# Patient Record
Sex: MALE | Race: WHITE | NOT HISPANIC OR LATINO
[De-identification: names, ages, dates, MRNs, and addresses within clinical notes are randomized per-mention and may not be internally consistent; named-entity substitution may affect disease eponyms.]

---

## 2017-01-10 ENCOUNTER — OTHER (OUTPATIENT)
Age: 57
End: 2017-01-10

## 2017-06-19 ENCOUNTER — RESULT REVIEW (OUTPATIENT)
Age: 57
End: 2017-06-19

## 2017-06-20 ENCOUNTER — TRANSCRIPTION ENCOUNTER (OUTPATIENT)
Age: 57
End: 2017-06-20

## 2018-08-21 ENCOUNTER — RECORD ABSTRACTING (OUTPATIENT)
Age: 58
End: 2018-08-21

## 2018-08-21 DIAGNOSIS — Z78.9 OTHER SPECIFIED HEALTH STATUS: ICD-10-CM

## 2018-09-14 ENCOUNTER — APPOINTMENT (OUTPATIENT)
Dept: PULMONOLOGY | Facility: CLINIC | Age: 58
End: 2018-09-14
Payer: MEDICARE

## 2018-09-14 VITALS — SYSTOLIC BLOOD PRESSURE: 135 MMHG | DIASTOLIC BLOOD PRESSURE: 81 MMHG | OXYGEN SATURATION: 97 % | HEART RATE: 54 BPM

## 2018-09-14 DIAGNOSIS — G47.33 OBSTRUCTIVE SLEEP APNEA (ADULT) (PEDIATRIC): ICD-10-CM

## 2018-09-14 PROCEDURE — 99213 OFFICE O/P EST LOW 20 MIN: CPT

## 2019-03-18 ENCOUNTER — APPOINTMENT (OUTPATIENT)
Dept: PULMONOLOGY | Facility: CLINIC | Age: 59
End: 2019-03-18

## 2020-01-26 ENCOUNTER — EMERGENCY (EMERGENCY)
Facility: HOSPITAL | Age: 60
LOS: 1 days | Discharge: HOME | End: 2020-01-26
Attending: EMERGENCY MEDICINE | Admitting: EMERGENCY MEDICINE
Payer: MEDICARE

## 2020-01-26 VITALS
TEMPERATURE: 99 F | RESPIRATION RATE: 15 BRPM | HEART RATE: 74 BPM | SYSTOLIC BLOOD PRESSURE: 157 MMHG | DIASTOLIC BLOOD PRESSURE: 78 MMHG | OXYGEN SATURATION: 96 %

## 2020-01-26 VITALS
DIASTOLIC BLOOD PRESSURE: 81 MMHG | RESPIRATION RATE: 17 BRPM | OXYGEN SATURATION: 100 % | SYSTOLIC BLOOD PRESSURE: 146 MMHG | HEART RATE: 80 BPM | TEMPERATURE: 99 F

## 2020-01-26 LAB
ALBUMIN SERPL ELPH-MCNC: 3.8 G/DL — SIGNIFICANT CHANGE UP (ref 3.3–5)
ALP SERPL-CCNC: 87 U/L — SIGNIFICANT CHANGE UP (ref 40–120)
ALT FLD-CCNC: 11 U/L — SIGNIFICANT CHANGE UP (ref 4–41)
ANION GAP SERPL CALC-SCNC: 17 MMO/L — HIGH (ref 7–14)
AST SERPL-CCNC: 14 U/L — SIGNIFICANT CHANGE UP (ref 4–40)
BASOPHILS # BLD AUTO: 0.03 K/UL — SIGNIFICANT CHANGE UP (ref 0–0.2)
BASOPHILS NFR BLD AUTO: 0.2 % — SIGNIFICANT CHANGE UP (ref 0–2)
BILIRUB SERPL-MCNC: 0.8 MG/DL — SIGNIFICANT CHANGE UP (ref 0.2–1.2)
BUN SERPL-MCNC: 31 MG/DL — HIGH (ref 7–23)
CALCIUM SERPL-MCNC: 9.3 MG/DL — SIGNIFICANT CHANGE UP (ref 8.4–10.5)
CHLORIDE SERPL-SCNC: 98 MMOL/L — SIGNIFICANT CHANGE UP (ref 98–107)
CO2 SERPL-SCNC: 27 MMOL/L — SIGNIFICANT CHANGE UP (ref 22–31)
CREAT SERPL-MCNC: 1.74 MG/DL — HIGH (ref 0.5–1.3)
CRP SERPL-MCNC: 40.3 MG/L — HIGH
EOSINOPHIL # BLD AUTO: 0.02 K/UL — SIGNIFICANT CHANGE UP (ref 0–0.5)
EOSINOPHIL NFR BLD AUTO: 0.2 % — SIGNIFICANT CHANGE UP (ref 0–6)
ERYTHROCYTE [SEDIMENTATION RATE] IN BLOOD: 55 MM/HR — HIGH (ref 1–15)
GLUCOSE SERPL-MCNC: 184 MG/DL — HIGH (ref 70–99)
HCT VFR BLD CALC: 39.7 % — SIGNIFICANT CHANGE UP (ref 39–50)
HGB BLD-MCNC: 13 G/DL — SIGNIFICANT CHANGE UP (ref 13–17)
IMM GRANULOCYTES NFR BLD AUTO: 0.2 % — SIGNIFICANT CHANGE UP (ref 0–1.5)
LYMPHOCYTES # BLD AUTO: 0.6 K/UL — LOW (ref 1–3.3)
LYMPHOCYTES # BLD AUTO: 5 % — LOW (ref 13–44)
MCHC RBC-ENTMCNC: 29.5 PG — SIGNIFICANT CHANGE UP (ref 27–34)
MCHC RBC-ENTMCNC: 32.7 % — SIGNIFICANT CHANGE UP (ref 32–36)
MCV RBC AUTO: 90 FL — SIGNIFICANT CHANGE UP (ref 80–100)
MONOCYTES # BLD AUTO: 0.93 K/UL — HIGH (ref 0–0.9)
MONOCYTES NFR BLD AUTO: 7.7 % — SIGNIFICANT CHANGE UP (ref 2–14)
NEUTROPHILS # BLD AUTO: 10.46 K/UL — HIGH (ref 1.8–7.4)
NEUTROPHILS NFR BLD AUTO: 86.7 % — HIGH (ref 43–77)
NRBC # FLD: 0 K/UL — SIGNIFICANT CHANGE UP (ref 0–0)
PLATELET # BLD AUTO: 170 K/UL — SIGNIFICANT CHANGE UP (ref 150–400)
PMV BLD: 10.6 FL — SIGNIFICANT CHANGE UP (ref 7–13)
POTASSIUM SERPL-MCNC: 3.5 MMOL/L — SIGNIFICANT CHANGE UP (ref 3.5–5.3)
POTASSIUM SERPL-SCNC: 3.5 MMOL/L — SIGNIFICANT CHANGE UP (ref 3.5–5.3)
PROT SERPL-MCNC: 7.6 G/DL — SIGNIFICANT CHANGE UP (ref 6–8.3)
RBC # BLD: 4.41 M/UL — SIGNIFICANT CHANGE UP (ref 4.2–5.8)
RBC # FLD: 13.4 % — SIGNIFICANT CHANGE UP (ref 10.3–14.5)
SODIUM SERPL-SCNC: 142 MMOL/L — SIGNIFICANT CHANGE UP (ref 135–145)
WBC # BLD: 12.07 K/UL — HIGH (ref 3.8–10.5)
WBC # FLD AUTO: 12.07 K/UL — HIGH (ref 3.8–10.5)

## 2020-01-26 PROCEDURE — 73110 X-RAY EXAM OF WRIST: CPT | Mod: 26,RT

## 2020-01-26 PROCEDURE — 99284 EMERGENCY DEPT VISIT MOD MDM: CPT | Mod: GC

## 2020-01-26 RX ORDER — KETOROLAC TROMETHAMINE 30 MG/ML
30 SYRINGE (ML) INJECTION ONCE
Refills: 0 | Status: DISCONTINUED | OUTPATIENT
Start: 2020-01-26 | End: 2020-01-26

## 2020-01-26 RX ORDER — COLCHICINE 0.6 MG
0.6 TABLET ORAL ONCE
Refills: 0 | Status: COMPLETED | OUTPATIENT
Start: 2020-01-26 | End: 2020-01-26

## 2020-01-26 RX ORDER — ACETAMINOPHEN 500 MG
975 TABLET ORAL ONCE
Refills: 0 | Status: COMPLETED | OUTPATIENT
Start: 2020-01-26 | End: 2020-01-26

## 2020-01-26 RX ORDER — COLCHICINE 0.6 MG
1 TABLET ORAL
Qty: 14 | Refills: 0
Start: 2020-01-26 | End: 2020-02-01

## 2020-01-26 RX ADMIN — Medication 0.6 MILLIGRAM(S): at 06:54

## 2020-01-26 RX ADMIN — Medication 975 MILLIGRAM(S): at 03:50

## 2020-01-26 RX ADMIN — Medication 30 MILLIGRAM(S): at 03:50

## 2020-01-26 RX ADMIN — Medication 975 MILLIGRAM(S): at 04:40

## 2020-01-26 RX ADMIN — Medication 30 MILLIGRAM(S): at 04:41

## 2020-01-26 NOTE — ED ADULT NURSE NOTE - NSIMPLEMENTINTERV_GEN_ALL_ED
Implemented All Universal Safety Interventions:  [place] to call system. Call bell, personal items and telephone within reach. Instruct patient to call for assistance. Room bathroom lighting operational. Non-slip footwear when patient is off stretcher. Physically safe environment: no spills, clutter or unnecessary equipment. Stretcher in lowest position, wheels locked, appropriate side rails in place.

## 2020-01-26 NOTE — ED PROVIDER NOTE - PHYSICAL EXAMINATION
Gen: AAOx3, non-toxic  Head: NCAT  HEENT: EOMI, oral mucosa moist, normal conjunctiva  Lung: CTAB, no respiratory distress, no wheezes/rhonchi/rales B/L, speaking in full sentences  CV: RRR, no murmurs, rubs or gallops  Abd: soft, NTND, no guarding, no CVA tenderness  MSK: Right wrist: exquisite TTP on dorsum, warmth with no fluctuance, limited range of motion due to pain. Neurovascularly intact.   Neuro: No focal sensory or motor deficits, normal CN exam   Skin: see above  Psych: normal affect.

## 2020-01-26 NOTE — ED ADULT NURSE NOTE - OBJECTIVE STATEMENT
Pt received to room 7 a/o x 3 c/o right wrist pain x 3 days. pt denies any trauma or any inj. pt states he has hx of gout and worried it may be that. pt has equal motor and sensation to bilateral upper extremities. Pt states its too painful to move. pt right wrist noted to be warm, swollen and red. Respirations even and unlabored. Lung sounds clear with equal chest rise bilaterally. ABD is soft, non tender, non distended with normal active bowel sounds No complaints of chest pain, headache, nausea, dizziness, vomiting  SOB, fever, chills verbalized.

## 2020-01-26 NOTE — ED PROVIDER NOTE - NSFOLLOWUPINSTRUCTIONS_ED_ALL_ED_FT
Please take the colchicine as prescribed.    Take tylenol (600mg every 8 hours as needed for pain, do not take on an empty stomach).    Follow up with your primary doctor as soon as possible.    Purchase and use wrist splints as discussed.    Return to the emergency department if you develop fevers, chills, worsening pain despite taking these medication, or any other symptoms that are concerning to you.

## 2020-01-26 NOTE — ED PROVIDER NOTE - OBJECTIVE STATEMENT
60yo M with gout, DM, HTN and HLD presents with right wrist pain for the last 2 days;  constant pain on the dorsum of his right wrist worse on hand movement. Denies precipitating factors, fractures or falls. Denies fevers or chills. Reports warmth and redness. Reports last gout attack was multiple years ago involving his knees and feet. Took percocet and Advil with no improvement.

## 2020-01-26 NOTE — ED PROVIDER NOTE - PATIENT PORTAL LINK FT
You can access the FollowMyHealth Patient Portal offered by [facility] by registering at the following website: [email or web address]. By joining [facility]’s FollowMyHealth portal, you will also be able to view your health information using other applications (apps) compatible with our system.

## 2020-01-26 NOTE — ED PROVIDER NOTE - PMH
Cataract  bilateral  HTN - Hypertension    Hyperlipidemia    Inguinal Hernia  Right 12/07  Obese    Quadriceps Tendon Rupture  5/98  Retinal Detachment  bilateral 2000, 2001

## 2020-01-26 NOTE — ED PROVIDER NOTE - CLINICAL SUMMARY MEDICAL DECISION MAKING FREE TEXT BOX
58yo M with gout, DM, HTN and HLD presents with right wrist pain for the last 2 days; Right wrist: exquisite TTP on dorsum, warmth with no fluctuance, limited range of motion due to pain. Neurovascularly intact..... 60yo M with gout, DM, HTN and HLD presents with right wrist pain for the last 2 days; Right wrist: exquisite TTP on dorsum, warmth with no fluctuance, limited range of motion due to pain. Neurovascularly intact. ddx; fracture vs less likely gout vs unlikely septic joint. Will get basic labs, esr, crp, x-ray + analgesia, and re-donna

## 2020-01-26 NOTE — ED ADULT TRIAGE NOTE - CHIEF COMPLAINT QUOTE
Pt c/o R wrist pain for the past 2 days.  Denies any injury.  PMHx: HTN, hyperlipidemia, hernia, retinal detachment

## 2020-01-26 NOTE — ED PROVIDER NOTE - ATTENDING CONTRIBUTION TO CARE
MD Nguyen:  I performed a face to face bedside interview with patient regarding history of present illness, review of symptoms and past medical history. I completed an independent physical exam(documented below).  I have discussed patient's plan of care with resident.   I agree with note as stated above, having amended the EMR as needed to reflect my findings. I have discussed the assessment and plan of care.  This includes during the time I functioned as the attending physician for this patient.  PE:  Gen: Alert, NAD  Head: NC, AT,  EOMI, normal lids/conjunctiva  ENT:  normal hearing, patent oropharynx without erythema/exudate  Neck: +supple, no tenderness/meningismus/JVD, +Trachea midline  Chest: no chest wall tenderness, equal chest rise  Pulm: Bilateral BS, normal resp effort, no wheeze/stridor/retractions  CV: RRR, no M/R/G, +dist pulses  Abd: +BS, soft, NT/ND  Rectal: deferred  Mskel: +erythema/edema/ttp dorsal R wrist  Skin: no rash  Neuro: AAOx3  MDM:   58yo  M pmh gout, c/o redness/swelling/pain to R wrist. Denies f/c. Feels like gout flares but this is a new location. H&P most consistent w/ gout flare (much less likely septic arthritis). Labs, xr, meds, reassess.

## 2025-04-02 ENCOUNTER — INPATIENT (INPATIENT)
Facility: HOSPITAL | Age: 65
LOS: 2 days | Discharge: NURSING, REHAB OR LONG-TERM CARE | End: 2025-04-05
Attending: INTERNAL MEDICINE | Admitting: INTERNAL MEDICINE
Payer: MEDICARE

## 2025-04-02 VITALS
HEART RATE: 94 BPM | SYSTOLIC BLOOD PRESSURE: 162 MMHG | RESPIRATION RATE: 18 BRPM | DIASTOLIC BLOOD PRESSURE: 108 MMHG | TEMPERATURE: 98 F | OXYGEN SATURATION: 98 %

## 2025-04-02 DIAGNOSIS — E11.9 TYPE 2 DIABETES MELLITUS WITHOUT COMPLICATIONS: ICD-10-CM

## 2025-04-02 DIAGNOSIS — N17.9 ACUTE KIDNEY FAILURE, UNSPECIFIED: ICD-10-CM

## 2025-04-02 DIAGNOSIS — I48.20 CHRONIC ATRIAL FIBRILLATION, UNSPECIFIED: ICD-10-CM

## 2025-04-02 DIAGNOSIS — I10 ESSENTIAL (PRIMARY) HYPERTENSION: ICD-10-CM

## 2025-04-02 DIAGNOSIS — Z29.9 ENCOUNTER FOR PROPHYLACTIC MEASURES, UNSPECIFIED: ICD-10-CM

## 2025-04-02 DIAGNOSIS — M25.562 PAIN IN LEFT KNEE: ICD-10-CM
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
## 2025-04-02 LAB
ADD ON TEST-SPECIMEN IN LAB: SIGNIFICANT CHANGE UP
ADD ON TEST-SPECIMEN IN LAB: SIGNIFICANT CHANGE UP
ALBUMIN SERPL ELPH-MCNC: 4.2 G/DL — SIGNIFICANT CHANGE UP (ref 3.3–5)
ALP SERPL-CCNC: 70 U/L — SIGNIFICANT CHANGE UP (ref 40–120)
ALT FLD-CCNC: 11 U/L — SIGNIFICANT CHANGE UP (ref 4–41)
ANION GAP SERPL CALC-SCNC: 18 MMOL/L — HIGH (ref 7–14)
ANION GAP SERPL CALC-SCNC: 18 MMOL/L — HIGH (ref 7–14)
APPEARANCE UR: CLEAR — SIGNIFICANT CHANGE UP
AST SERPL-CCNC: 18 U/L — SIGNIFICANT CHANGE UP (ref 4–40)
BASOPHILS # BLD AUTO: 0.09 K/UL — SIGNIFICANT CHANGE UP (ref 0–0.2)
BASOPHILS NFR BLD AUTO: 0.9 % — SIGNIFICANT CHANGE UP (ref 0–2)
BILIRUB SERPL-MCNC: 0.9 MG/DL — SIGNIFICANT CHANGE UP (ref 0.2–1.2)
BILIRUB UR-MCNC: NEGATIVE — SIGNIFICANT CHANGE UP
BUN SERPL-MCNC: 76 MG/DL — HIGH (ref 7–23)
BUN SERPL-MCNC: 81 MG/DL — HIGH (ref 7–23)
CALCIUM SERPL-MCNC: 8.9 MG/DL — SIGNIFICANT CHANGE UP (ref 8.4–10.5)
CALCIUM SERPL-MCNC: 9 MG/DL — SIGNIFICANT CHANGE UP (ref 8.4–10.5)
CHLORIDE SERPL-SCNC: 110 MMOL/L — HIGH (ref 98–107)
CHLORIDE SERPL-SCNC: 111 MMOL/L — HIGH (ref 98–107)
CO2 SERPL-SCNC: 13 MMOL/L — LOW (ref 22–31)
CO2 SERPL-SCNC: 15 MMOL/L — LOW (ref 22–31)
COLOR SPEC: YELLOW — SIGNIFICANT CHANGE UP
CREAT ?TM UR-MCNC: 20 MG/DL — SIGNIFICANT CHANGE UP
CREAT ?TM UR-MCNC: 28 MG/DL — SIGNIFICANT CHANGE UP
CREAT SERPL-MCNC: 3.79 MG/DL — HIGH (ref 0.5–1.3)
CREAT SERPL-MCNC: 3.91 MG/DL — HIGH (ref 0.5–1.3)
CRP SERPL-MCNC: <3 MG/L — SIGNIFICANT CHANGE UP
DIFF PNL FLD: ABNORMAL
EGFR: 16 ML/MIN/1.73M2 — LOW
EGFR: 16 ML/MIN/1.73M2 — LOW
EGFR: 17 ML/MIN/1.73M2 — LOW
EGFR: 17 ML/MIN/1.73M2 — LOW
EOSINOPHIL # BLD AUTO: 0.09 K/UL — SIGNIFICANT CHANGE UP (ref 0–0.5)
EOSINOPHIL NFR BLD AUTO: 0.9 % — SIGNIFICANT CHANGE UP (ref 0–6)
GLUCOSE BLDC GLUCOMTR-MCNC: 105 MG/DL — HIGH (ref 70–99)
GLUCOSE BLDC GLUCOMTR-MCNC: 83 MG/DL — SIGNIFICANT CHANGE UP (ref 70–99)
GLUCOSE BLDC GLUCOMTR-MCNC: 90 MG/DL — SIGNIFICANT CHANGE UP (ref 70–99)
GLUCOSE BLDC GLUCOMTR-MCNC: 97 MG/DL — SIGNIFICANT CHANGE UP (ref 70–99)
GLUCOSE SERPL-MCNC: 105 MG/DL — HIGH (ref 70–99)
GLUCOSE SERPL-MCNC: 119 MG/DL — HIGH (ref 70–99)
GLUCOSE UR QL: NEGATIVE MG/DL — SIGNIFICANT CHANGE UP
HCT VFR BLD CALC: 36.5 % — LOW (ref 39–50)
HGB BLD-MCNC: 12.1 G/DL — LOW (ref 13–17)
IANC: 8.3 K/UL — HIGH (ref 1.8–7.4)
KETONES UR-MCNC: NEGATIVE MG/DL — SIGNIFICANT CHANGE UP
LEUKOCYTE ESTERASE UR-ACNC: NEGATIVE — SIGNIFICANT CHANGE UP
LYMPHOCYTES # BLD AUTO: 0.09 K/UL — LOW (ref 1–3.3)
LYMPHOCYTES # BLD AUTO: 0.9 % — LOW (ref 13–44)
MCHC RBC-ENTMCNC: 30 PG — SIGNIFICANT CHANGE UP (ref 27–34)
MCHC RBC-ENTMCNC: 33.2 G/DL — SIGNIFICANT CHANGE UP (ref 32–36)
MCV RBC AUTO: 90.6 FL — SIGNIFICANT CHANGE UP (ref 80–100)
MONOCYTES # BLD AUTO: 0.77 K/UL — SIGNIFICANT CHANGE UP (ref 0–0.9)
MONOCYTES NFR BLD AUTO: 7.9 % — SIGNIFICANT CHANGE UP (ref 2–14)
NEUTROPHILS # BLD AUTO: 8.74 K/UL — HIGH (ref 1.8–7.4)
NEUTROPHILS NFR BLD AUTO: 89.4 % — HIGH (ref 43–77)
NITRITE UR-MCNC: NEGATIVE — SIGNIFICANT CHANGE UP
PH UR: 6 — SIGNIFICANT CHANGE UP (ref 5–8)
PLATELET # BLD AUTO: 124 K/UL — LOW (ref 150–400)
POTASSIUM SERPL-MCNC: 3.5 MMOL/L — SIGNIFICANT CHANGE UP (ref 3.5–5.3)
POTASSIUM SERPL-MCNC: 3.7 MMOL/L — SIGNIFICANT CHANGE UP (ref 3.5–5.3)
POTASSIUM SERPL-SCNC: 3.5 MMOL/L — SIGNIFICANT CHANGE UP (ref 3.5–5.3)
POTASSIUM SERPL-SCNC: 3.7 MMOL/L — SIGNIFICANT CHANGE UP (ref 3.5–5.3)
PROT ?TM UR-MCNC: 41 MG/DL — SIGNIFICANT CHANGE UP
PROT SERPL-MCNC: 6.8 G/DL — SIGNIFICANT CHANGE UP (ref 6–8.3)
PROT UR-MCNC: 30 MG/DL
PROT/CREAT UR-RTO: 2 RATIO — HIGH (ref 0–0.2)
RBC # BLD: 4.03 M/UL — LOW (ref 4.2–5.8)
RBC # FLD: 13.5 % — SIGNIFICANT CHANGE UP (ref 10.3–14.5)
SODIUM SERPL-SCNC: 141 MMOL/L — SIGNIFICANT CHANGE UP (ref 135–145)
SODIUM SERPL-SCNC: 144 MMOL/L — SIGNIFICANT CHANGE UP (ref 135–145)
SODIUM UR-SCNC: 87 MMOL/L — SIGNIFICANT CHANGE UP
SP GR SPEC: 1.01 — SIGNIFICANT CHANGE UP (ref 1–1.03)
URATE SERPL-MCNC: 7.5 MG/DL — SIGNIFICANT CHANGE UP (ref 3.4–8.8)
URATE SERPL-MCNC: 7.8 MG/DL — SIGNIFICANT CHANGE UP (ref 3.4–8.8)
UROBILINOGEN FLD QL: 0.2 MG/DL — SIGNIFICANT CHANGE UP (ref 0.2–1)
WBC # BLD: 9.78 K/UL — SIGNIFICANT CHANGE UP (ref 3.8–10.5)
WBC # FLD AUTO: 9.78 K/UL — SIGNIFICANT CHANGE UP (ref 3.8–10.5)

## 2025-04-02 PROCEDURE — 73502 X-RAY EXAM HIP UNI 2-3 VIEWS: CPT | Mod: 26,LT

## 2025-04-02 PROCEDURE — 73552 X-RAY EXAM OF FEMUR 2/>: CPT | Mod: 26,LT

## 2025-04-02 PROCEDURE — 76770 US EXAM ABDO BACK WALL COMP: CPT | Mod: 26

## 2025-04-02 PROCEDURE — 99223 1ST HOSP IP/OBS HIGH 75: CPT | Mod: FS,GC

## 2025-04-02 PROCEDURE — 99223 1ST HOSP IP/OBS HIGH 75: CPT

## 2025-04-02 PROCEDURE — 72170 X-RAY EXAM OF PELVIS: CPT | Mod: 26,XE

## 2025-04-02 PROCEDURE — 73562 X-RAY EXAM OF KNEE 3: CPT | Mod: 26,LT

## 2025-04-02 PROCEDURE — 99285 EMERGENCY DEPT VISIT HI MDM: CPT

## 2025-04-02 RX ORDER — INSULIN LISPRO 100 U/ML
INJECTION, SOLUTION INTRAVENOUS; SUBCUTANEOUS
Refills: 0 | Status: DISCONTINUED | OUTPATIENT
Start: 2025-04-02 | End: 2025-04-05

## 2025-04-02 RX ORDER — DEXTROSE 50 % IN WATER 50 %
25 SYRINGE (ML) INTRAVENOUS ONCE
Refills: 0 | Status: DISCONTINUED | OUTPATIENT
Start: 2025-04-02 | End: 2025-04-05

## 2025-04-02 RX ORDER — ACETAMINOPHEN 500 MG/5ML
650 LIQUID (ML) ORAL EVERY 6 HOURS
Refills: 0 | Status: DISCONTINUED | OUTPATIENT
Start: 2025-04-02 | End: 2025-04-02

## 2025-04-02 RX ORDER — GLUCAGON 3 MG/1
1 POWDER NASAL ONCE
Refills: 0 | Status: DISCONTINUED | OUTPATIENT
Start: 2025-04-02 | End: 2025-04-05

## 2025-04-02 RX ORDER — ACETAMINOPHEN 500 MG/5ML
975 LIQUID (ML) ORAL ONCE
Refills: 0 | Status: COMPLETED | OUTPATIENT
Start: 2025-04-02 | End: 2025-04-02

## 2025-04-02 RX ORDER — DEXTROSE 50 % IN WATER 50 %
15 SYRINGE (ML) INTRAVENOUS ONCE
Refills: 0 | Status: DISCONTINUED | OUTPATIENT
Start: 2025-04-02 | End: 2025-04-05

## 2025-04-02 RX ORDER — ACETAMINOPHEN 500 MG/5ML
650 LIQUID (ML) ORAL EVERY 6 HOURS
Refills: 0 | Status: DISCONTINUED | OUTPATIENT
Start: 2025-04-02 | End: 2025-04-05

## 2025-04-02 RX ORDER — METOPROLOL SUCCINATE 50 MG/1
100 TABLET, EXTENDED RELEASE ORAL DAILY
Refills: 0 | Status: DISCONTINUED | OUTPATIENT
Start: 2025-04-02 | End: 2025-04-05

## 2025-04-02 RX ORDER — APIXABAN 2.5 MG/1
5 TABLET, FILM COATED ORAL EVERY 12 HOURS
Refills: 0 | Status: DISCONTINUED | OUTPATIENT
Start: 2025-04-02 | End: 2025-04-05

## 2025-04-02 RX ORDER — AMMONIUM LACTATE 12 %
1 LOTION (ML) TOPICAL
Refills: 0 | Status: DISCONTINUED | OUTPATIENT
Start: 2025-04-02 | End: 2025-04-05

## 2025-04-02 RX ORDER — SODIUM CHLORIDE 9 G/1000ML
1000 INJECTION, SOLUTION INTRAVENOUS
Refills: 0 | Status: DISCONTINUED | OUTPATIENT
Start: 2025-04-02 | End: 2025-04-05

## 2025-04-02 RX ORDER — AMLODIPINE BESYLATE 10 MG/1
10 TABLET ORAL DAILY
Refills: 0 | Status: DISCONTINUED | OUTPATIENT
Start: 2025-04-02 | End: 2025-04-05

## 2025-04-02 RX ORDER — MELATONIN 5 MG
3 TABLET ORAL AT BEDTIME
Refills: 0 | Status: DISCONTINUED | OUTPATIENT
Start: 2025-04-02 | End: 2025-04-05

## 2025-04-02 RX ORDER — LIDOCAINE HYDROCHLORIDE 20 MG/ML
1 JELLY TOPICAL DAILY
Refills: 0 | Status: DISCONTINUED | OUTPATIENT
Start: 2025-04-02 | End: 2025-04-05

## 2025-04-02 RX ORDER — DEXTROSE 50 % IN WATER 50 %
12.5 SYRINGE (ML) INTRAVENOUS ONCE
Refills: 0 | Status: DISCONTINUED | OUTPATIENT
Start: 2025-04-02 | End: 2025-04-05

## 2025-04-02 RX ORDER — INSULIN LISPRO 100 U/ML
INJECTION, SOLUTION INTRAVENOUS; SUBCUTANEOUS AT BEDTIME
Refills: 0 | Status: DISCONTINUED | OUTPATIENT
Start: 2025-04-02 | End: 2025-04-05

## 2025-04-02 RX ADMIN — Medication 650 MILLIGRAM(S): at 14:47

## 2025-04-02 RX ADMIN — Medication 650 MILLIGRAM(S): at 19:08

## 2025-04-02 RX ADMIN — APIXABAN 5 MILLIGRAM(S): 2.5 TABLET, FILM COATED ORAL at 19:10

## 2025-04-02 RX ADMIN — Medication 25 MILLIGRAM(S): at 23:07

## 2025-04-02 RX ADMIN — Medication 650 MILLIGRAM(S): at 13:47

## 2025-04-02 RX ADMIN — LIDOCAINE HYDROCHLORIDE 1 PATCH: 20 JELLY TOPICAL at 13:47

## 2025-04-02 RX ADMIN — LIDOCAINE HYDROCHLORIDE 1 PATCH: 20 JELLY TOPICAL at 18:39

## 2025-04-02 RX ADMIN — Medication 1 APPLICATION(S): at 19:07

## 2025-04-02 RX ADMIN — Medication 975 MILLIGRAM(S): at 04:26

## 2025-04-02 RX ADMIN — Medication 975 MILLIGRAM(S): at 02:47

## 2025-04-02 RX ADMIN — Medication 650 MILLIGRAM(S): at 23:06

## 2025-04-02 RX ADMIN — Medication 25 MILLIGRAM(S): at 13:47

## 2025-04-02 RX ADMIN — Medication 1000 MILLILITER(S): at 04:18

## 2025-04-03 LAB
A1C WITH ESTIMATED AVERAGE GLUCOSE RESULT: 4.6 % — SIGNIFICANT CHANGE UP (ref 4–5.6)
ANION GAP SERPL CALC-SCNC: 18 MMOL/L — HIGH (ref 7–14)
BUN SERPL-MCNC: 64 MG/DL — HIGH (ref 7–23)
CALCIUM SERPL-MCNC: 8.6 MG/DL — SIGNIFICANT CHANGE UP (ref 8.4–10.5)
CHLORIDE SERPL-SCNC: 109 MMOL/L — HIGH (ref 98–107)
CHOLEST SERPL-MCNC: 107 MG/DL — SIGNIFICANT CHANGE UP
CK SERPL-CCNC: 154 U/L — SIGNIFICANT CHANGE UP (ref 30–200)
CO2 SERPL-SCNC: 15 MMOL/L — LOW (ref 22–31)
CREAT SERPL-MCNC: 3.62 MG/DL — HIGH (ref 0.5–1.3)
EGFR: 18 ML/MIN/1.73M2 — LOW
EGFR: 18 ML/MIN/1.73M2 — LOW
ESTIMATED AVERAGE GLUCOSE: 85 — SIGNIFICANT CHANGE UP
GLUCOSE BLDC GLUCOMTR-MCNC: 107 MG/DL — HIGH (ref 70–99)
GLUCOSE BLDC GLUCOMTR-MCNC: 82 MG/DL — SIGNIFICANT CHANGE UP (ref 70–99)
GLUCOSE BLDC GLUCOMTR-MCNC: 84 MG/DL — SIGNIFICANT CHANGE UP (ref 70–99)
GLUCOSE BLDC GLUCOMTR-MCNC: 98 MG/DL — SIGNIFICANT CHANGE UP (ref 70–99)
GLUCOSE SERPL-MCNC: 84 MG/DL — SIGNIFICANT CHANGE UP (ref 70–99)
HCT VFR BLD CALC: 32.6 % — LOW (ref 39–50)
HDLC SERPL-MCNC: 28 MG/DL — LOW
HGB BLD-MCNC: 10.8 G/DL — LOW (ref 13–17)
LDLC SERPL-MCNC: 61 MG/DL — SIGNIFICANT CHANGE UP
LIPID PNL WITH DIRECT LDL SERPL: 61 MG/DL — SIGNIFICANT CHANGE UP
MAGNESIUM SERPL-MCNC: 1.9 MG/DL — SIGNIFICANT CHANGE UP (ref 1.6–2.6)
MCHC RBC-ENTMCNC: 29.5 PG — SIGNIFICANT CHANGE UP (ref 27–34)
MCHC RBC-ENTMCNC: 33.1 G/DL — SIGNIFICANT CHANGE UP (ref 32–36)
MCV RBC AUTO: 89.1 FL — SIGNIFICANT CHANGE UP (ref 80–100)
NONHDLC SERPL-MCNC: 79 MG/DL — SIGNIFICANT CHANGE UP
NRBC # BLD AUTO: 0 K/UL — SIGNIFICANT CHANGE UP (ref 0–0)
NRBC # FLD: 0 K/UL — SIGNIFICANT CHANGE UP (ref 0–0)
NRBC BLD AUTO-RTO: 0 /100 WBCS — SIGNIFICANT CHANGE UP (ref 0–0)
PHOSPHATE SERPL-MCNC: 3.9 MG/DL — SIGNIFICANT CHANGE UP (ref 2.5–4.5)
PLATELET # BLD AUTO: 126 K/UL — LOW (ref 150–400)
POTASSIUM SERPL-MCNC: 3.2 MMOL/L — LOW (ref 3.5–5.3)
POTASSIUM SERPL-SCNC: 3.2 MMOL/L — LOW (ref 3.5–5.3)
RBC # BLD: 3.66 M/UL — LOW (ref 4.2–5.8)
RBC # FLD: 13.4 % — SIGNIFICANT CHANGE UP (ref 10.3–14.5)
SODIUM SERPL-SCNC: 142 MMOL/L — SIGNIFICANT CHANGE UP (ref 135–145)
TRIGL SERPL-MCNC: 94 MG/DL — SIGNIFICANT CHANGE UP
WBC # BLD: 6.71 K/UL — SIGNIFICANT CHANGE UP (ref 3.8–10.5)
WBC # FLD AUTO: 6.71 K/UL — SIGNIFICANT CHANGE UP (ref 3.8–10.5)

## 2025-04-03 PROCEDURE — 99232 SBSQ HOSP IP/OBS MODERATE 35: CPT | Mod: FS,GC

## 2025-04-03 RX ORDER — SODIUM BICARBONATE 1 MEQ/ML
650 SYRINGE (ML) INTRAVENOUS THREE TIMES A DAY
Refills: 0 | Status: DISCONTINUED | OUTPATIENT
Start: 2025-04-03 | End: 2025-04-05

## 2025-04-03 RX ORDER — TRAMADOL HYDROCHLORIDE 50 MG/1
25 TABLET, FILM COATED ORAL ONCE
Refills: 0 | Status: DISCONTINUED | OUTPATIENT
Start: 2025-04-03 | End: 2025-04-03

## 2025-04-03 RX ADMIN — LIDOCAINE HYDROCHLORIDE 1 PATCH: 20 JELLY TOPICAL at 12:39

## 2025-04-03 RX ADMIN — Medication 650 MILLIGRAM(S): at 14:13

## 2025-04-03 RX ADMIN — Medication 25 MILLIGRAM(S): at 21:54

## 2025-04-03 RX ADMIN — Medication 650 MILLIGRAM(S): at 21:54

## 2025-04-03 RX ADMIN — Medication 25 MILLIGRAM(S): at 05:56

## 2025-04-03 RX ADMIN — TRAMADOL HYDROCHLORIDE 25 MILLIGRAM(S): 50 TABLET, FILM COATED ORAL at 01:37

## 2025-04-03 RX ADMIN — Medication 1 APPLICATION(S): at 05:56

## 2025-04-03 RX ADMIN — LIDOCAINE HYDROCHLORIDE 1 PATCH: 20 JELLY TOPICAL at 18:22

## 2025-04-03 RX ADMIN — Medication 650 MILLIGRAM(S): at 05:55

## 2025-04-03 RX ADMIN — APIXABAN 5 MILLIGRAM(S): 2.5 TABLET, FILM COATED ORAL at 18:10

## 2025-04-03 RX ADMIN — Medication 650 MILLIGRAM(S): at 18:10

## 2025-04-03 RX ADMIN — AMLODIPINE BESYLATE 10 MILLIGRAM(S): 10 TABLET ORAL at 05:56

## 2025-04-03 RX ADMIN — Medication 1 APPLICATION(S): at 18:09

## 2025-04-03 RX ADMIN — METOPROLOL SUCCINATE 100 MILLIGRAM(S): 50 TABLET, EXTENDED RELEASE ORAL at 05:56

## 2025-04-03 RX ADMIN — Medication 650 MILLIGRAM(S): at 12:39

## 2025-04-03 RX ADMIN — Medication 1 APPLICATION(S): at 12:42

## 2025-04-03 RX ADMIN — LIDOCAINE HYDROCHLORIDE 1 PATCH: 20 JELLY TOPICAL at 08:26

## 2025-04-03 RX ADMIN — Medication 25 MILLIGRAM(S): at 14:13

## 2025-04-03 RX ADMIN — APIXABAN 5 MILLIGRAM(S): 2.5 TABLET, FILM COATED ORAL at 05:56

## 2025-04-03 RX ADMIN — TRAMADOL HYDROCHLORIDE 25 MILLIGRAM(S): 50 TABLET, FILM COATED ORAL at 00:37

## 2025-04-03 RX ADMIN — Medication 650 MILLIGRAM(S): at 08:27

## 2025-04-03 RX ADMIN — Medication 40 MILLIEQUIVALENT(S): at 09:49

## 2025-04-04 LAB
ANION GAP SERPL CALC-SCNC: 15 MMOL/L — HIGH (ref 7–14)
BUN SERPL-MCNC: 67 MG/DL — HIGH (ref 7–23)
CALCIUM SERPL-MCNC: 8.4 MG/DL — SIGNIFICANT CHANGE UP (ref 8.4–10.5)
CHLORIDE SERPL-SCNC: 110 MMOL/L — HIGH (ref 98–107)
CO2 SERPL-SCNC: 17 MMOL/L — LOW (ref 22–31)
CREAT SERPL-MCNC: 3.79 MG/DL — HIGH (ref 0.5–1.3)
EGFR: 17 ML/MIN/1.73M2 — LOW
EGFR: 17 ML/MIN/1.73M2 — LOW
GLUCOSE BLDC GLUCOMTR-MCNC: 120 MG/DL — HIGH (ref 70–99)
GLUCOSE BLDC GLUCOMTR-MCNC: 82 MG/DL — SIGNIFICANT CHANGE UP (ref 70–99)
GLUCOSE BLDC GLUCOMTR-MCNC: 88 MG/DL — SIGNIFICANT CHANGE UP (ref 70–99)
GLUCOSE BLDC GLUCOMTR-MCNC: 89 MG/DL — SIGNIFICANT CHANGE UP (ref 70–99)
GLUCOSE SERPL-MCNC: 96 MG/DL — SIGNIFICANT CHANGE UP (ref 70–99)
HCT VFR BLD CALC: 31.3 % — LOW (ref 39–50)
HGB BLD-MCNC: 10.4 G/DL — LOW (ref 13–17)
MAGNESIUM SERPL-MCNC: 2 MG/DL — SIGNIFICANT CHANGE UP (ref 1.6–2.6)
MCHC RBC-ENTMCNC: 29.5 PG — SIGNIFICANT CHANGE UP (ref 27–34)
MCHC RBC-ENTMCNC: 33.2 G/DL — SIGNIFICANT CHANGE UP (ref 32–36)
MCV RBC AUTO: 88.7 FL — SIGNIFICANT CHANGE UP (ref 80–100)
MRSA PCR RESULT.: SIGNIFICANT CHANGE UP
NRBC # BLD AUTO: 0 K/UL — SIGNIFICANT CHANGE UP (ref 0–0)
NRBC # FLD: 0 K/UL — SIGNIFICANT CHANGE UP (ref 0–0)
NRBC BLD AUTO-RTO: 0 /100 WBCS — SIGNIFICANT CHANGE UP (ref 0–0)
PHOSPHATE SERPL-MCNC: 3.8 MG/DL — SIGNIFICANT CHANGE UP (ref 2.5–4.5)
PLATELET # BLD AUTO: 121 K/UL — LOW (ref 150–400)
POTASSIUM SERPL-MCNC: 3.3 MMOL/L — LOW (ref 3.5–5.3)
POTASSIUM SERPL-SCNC: 3.3 MMOL/L — LOW (ref 3.5–5.3)
RBC # BLD: 3.53 M/UL — LOW (ref 4.2–5.8)
RBC # FLD: 13.6 % — SIGNIFICANT CHANGE UP (ref 10.3–14.5)
S AUREUS DNA NOSE QL NAA+PROBE: SIGNIFICANT CHANGE UP
SODIUM SERPL-SCNC: 142 MMOL/L — SIGNIFICANT CHANGE UP (ref 135–145)
WBC # BLD: 7.17 K/UL — SIGNIFICANT CHANGE UP (ref 3.8–10.5)
WBC # FLD AUTO: 7.17 K/UL — SIGNIFICANT CHANGE UP (ref 3.8–10.5)

## 2025-04-04 PROCEDURE — 99232 SBSQ HOSP IP/OBS MODERATE 35: CPT | Mod: FS,GC

## 2025-04-04 RX ORDER — ACETAMINOPHEN 500 MG/5ML
1000 LIQUID (ML) ORAL ONCE
Refills: 0 | Status: COMPLETED | OUTPATIENT
Start: 2025-04-04 | End: 2025-04-04

## 2025-04-04 RX ADMIN — LIDOCAINE HYDROCHLORIDE 1 PATCH: 20 JELLY TOPICAL at 02:16

## 2025-04-04 RX ADMIN — Medication 25 MILLIGRAM(S): at 21:52

## 2025-04-04 RX ADMIN — Medication 400 MILLIGRAM(S): at 21:52

## 2025-04-04 RX ADMIN — APIXABAN 5 MILLIGRAM(S): 2.5 TABLET, FILM COATED ORAL at 17:21

## 2025-04-04 RX ADMIN — METOPROLOL SUCCINATE 100 MILLIGRAM(S): 50 TABLET, EXTENDED RELEASE ORAL at 05:32

## 2025-04-04 RX ADMIN — LIDOCAINE HYDROCHLORIDE 1 PATCH: 20 JELLY TOPICAL at 11:07

## 2025-04-04 RX ADMIN — Medication 1 APPLICATION(S): at 11:12

## 2025-04-04 RX ADMIN — Medication 25 MILLIGRAM(S): at 14:15

## 2025-04-04 RX ADMIN — Medication 25 MILLIGRAM(S): at 05:32

## 2025-04-04 RX ADMIN — AMLODIPINE BESYLATE 10 MILLIGRAM(S): 10 TABLET ORAL at 05:32

## 2025-04-04 RX ADMIN — Medication 1000 MILLIGRAM(S): at 22:22

## 2025-04-04 RX ADMIN — Medication 650 MILLIGRAM(S): at 05:32

## 2025-04-04 RX ADMIN — Medication 650 MILLIGRAM(S): at 14:16

## 2025-04-04 RX ADMIN — Medication 1 APPLICATION(S): at 05:32

## 2025-04-04 RX ADMIN — LIDOCAINE HYDROCHLORIDE 1 PATCH: 20 JELLY TOPICAL at 23:04

## 2025-04-04 RX ADMIN — Medication 650 MILLIGRAM(S): at 01:25

## 2025-04-04 RX ADMIN — Medication 650 MILLIGRAM(S): at 17:21

## 2025-04-04 RX ADMIN — Medication 1 APPLICATION(S): at 17:21

## 2025-04-04 RX ADMIN — Medication 40 MILLIEQUIVALENT(S): at 14:15

## 2025-04-04 RX ADMIN — APIXABAN 5 MILLIGRAM(S): 2.5 TABLET, FILM COATED ORAL at 05:32

## 2025-04-04 RX ADMIN — Medication 650 MILLIGRAM(S): at 11:06

## 2025-04-04 RX ADMIN — Medication 40 MILLIEQUIVALENT(S): at 11:06

## 2025-04-04 RX ADMIN — Medication 650 MILLIGRAM(S): at 21:52

## 2025-04-05 VITALS
HEART RATE: 88 BPM | OXYGEN SATURATION: 99 % | SYSTOLIC BLOOD PRESSURE: 142 MMHG | RESPIRATION RATE: 18 BRPM | DIASTOLIC BLOOD PRESSURE: 90 MMHG

## 2025-04-05 PROBLEM — C61 MALIGNANT NEOPLASM OF PROSTATE: Chronic | Status: ACTIVE | Noted: 2025-04-02

## 2025-04-05 PROBLEM — E11.9 TYPE 2 DIABETES MELLITUS WITHOUT COMPLICATIONS: Chronic | Status: ACTIVE | Noted: 2025-04-02

## 2025-04-05 PROBLEM — N18.9 CHRONIC KIDNEY DISEASE, UNSPECIFIED: Chronic | Status: ACTIVE | Noted: 2025-04-02

## 2025-04-05 PROBLEM — M10.9 GOUT, UNSPECIFIED: Chronic | Status: ACTIVE | Noted: 2025-04-02

## 2025-04-05 PROBLEM — I48.20 CHRONIC ATRIAL FIBRILLATION, UNSPECIFIED: Chronic | Status: ACTIVE | Noted: 2025-04-02

## 2025-04-05 LAB
ANION GAP SERPL CALC-SCNC: 15 MMOL/L — HIGH (ref 7–14)
BUN SERPL-MCNC: 67 MG/DL — HIGH (ref 7–23)
CALCIUM SERPL-MCNC: 8.5 MG/DL — SIGNIFICANT CHANGE UP (ref 8.4–10.5)
CHLORIDE SERPL-SCNC: 110 MMOL/L — HIGH (ref 98–107)
CO2 SERPL-SCNC: 18 MMOL/L — LOW (ref 22–31)
CREAT SERPL-MCNC: 3.82 MG/DL — HIGH (ref 0.5–1.3)
EGFR: 17 ML/MIN/1.73M2 — LOW
EGFR: 17 ML/MIN/1.73M2 — LOW
GLUCOSE BLDC GLUCOMTR-MCNC: 92 MG/DL — SIGNIFICANT CHANGE UP (ref 70–99)
GLUCOSE BLDC GLUCOMTR-MCNC: 99 MG/DL — SIGNIFICANT CHANGE UP (ref 70–99)
GLUCOSE SERPL-MCNC: 97 MG/DL — SIGNIFICANT CHANGE UP (ref 70–99)
HCT VFR BLD CALC: 33 % — LOW (ref 39–50)
HGB BLD-MCNC: 10.7 G/DL — LOW (ref 13–17)
MAGNESIUM SERPL-MCNC: 2 MG/DL — SIGNIFICANT CHANGE UP (ref 1.6–2.6)
MCHC RBC-ENTMCNC: 29.6 PG — SIGNIFICANT CHANGE UP (ref 27–34)
MCHC RBC-ENTMCNC: 32.4 G/DL — SIGNIFICANT CHANGE UP (ref 32–36)
MCV RBC AUTO: 91.4 FL — SIGNIFICANT CHANGE UP (ref 80–100)
NRBC # BLD AUTO: 0 K/UL — SIGNIFICANT CHANGE UP (ref 0–0)
NRBC # FLD: 0 K/UL — SIGNIFICANT CHANGE UP (ref 0–0)
NRBC BLD AUTO-RTO: 0 /100 WBCS — SIGNIFICANT CHANGE UP (ref 0–0)
PHOSPHATE SERPL-MCNC: 3.5 MG/DL — SIGNIFICANT CHANGE UP (ref 2.5–4.5)
PLATELET # BLD AUTO: 119 K/UL — LOW (ref 150–400)
POTASSIUM SERPL-MCNC: 4 MMOL/L — SIGNIFICANT CHANGE UP (ref 3.5–5.3)
POTASSIUM SERPL-SCNC: 4 MMOL/L — SIGNIFICANT CHANGE UP (ref 3.5–5.3)
RBC # BLD: 3.61 M/UL — LOW (ref 4.2–5.8)
RBC # FLD: 13.4 % — SIGNIFICANT CHANGE UP (ref 10.3–14.5)
SODIUM SERPL-SCNC: 143 MMOL/L — SIGNIFICANT CHANGE UP (ref 135–145)
WBC # BLD: 7.65 K/UL — SIGNIFICANT CHANGE UP (ref 3.8–10.5)
WBC # FLD AUTO: 7.65 K/UL — SIGNIFICANT CHANGE UP (ref 3.8–10.5)

## 2025-04-05 RX ORDER — SODIUM BICARBONATE 1 MEQ/ML
1 SYRINGE (ML) INTRAVENOUS
Qty: 0 | Refills: 0 | DISCHARGE
Start: 2025-04-05

## 2025-04-05 RX ORDER — LIDOCAINE HYDROCHLORIDE 20 MG/ML
1 JELLY TOPICAL
Qty: 0 | Refills: 0 | DISCHARGE
Start: 2025-04-05

## 2025-04-05 RX ORDER — AMMONIUM LACTATE 12 %
1 LOTION (ML) TOPICAL
Qty: 0 | Refills: 0 | DISCHARGE
Start: 2025-04-05

## 2025-04-05 RX ORDER — INSULIN LISPRO 100 U/ML
1 INJECTION, SOLUTION INTRAVENOUS; SUBCUTANEOUS
Qty: 0 | Refills: 0 | DISCHARGE
Start: 2025-04-05

## 2025-04-05 RX ORDER — SITAGLIPTIN 100 MG/1
1 TABLET, FILM COATED ORAL
Refills: 0 | DISCHARGE

## 2025-04-05 RX ADMIN — Medication 650 MILLIGRAM(S): at 12:47

## 2025-04-05 RX ADMIN — APIXABAN 5 MILLIGRAM(S): 2.5 TABLET, FILM COATED ORAL at 06:32

## 2025-04-05 RX ADMIN — Medication 650 MILLIGRAM(S): at 06:31

## 2025-04-05 RX ADMIN — Medication 650 MILLIGRAM(S): at 06:32

## 2025-04-05 RX ADMIN — Medication 1 APPLICATION(S): at 06:33

## 2025-04-05 RX ADMIN — AMLODIPINE BESYLATE 10 MILLIGRAM(S): 10 TABLET ORAL at 06:33

## 2025-04-05 RX ADMIN — Medication 25 MILLIGRAM(S): at 06:33

## 2025-04-05 RX ADMIN — LIDOCAINE HYDROCHLORIDE 1 PATCH: 20 JELLY TOPICAL at 00:59

## 2025-04-22 ENCOUNTER — RESULT REVIEW (OUTPATIENT)
Age: 65
End: 2025-04-22

## 2025-04-25 ENCOUNTER — RESULT REVIEW (OUTPATIENT)
Age: 65
End: 2025-04-25

## 2025-04-25 ENCOUNTER — OUTPATIENT (OUTPATIENT)
Dept: OUTPATIENT SERVICES | Facility: HOSPITAL | Age: 65
LOS: 1 days | End: 2025-04-25
Payer: MEDICARE

## 2025-04-25 ENCOUNTER — APPOINTMENT (OUTPATIENT)
Dept: ULTRASOUND IMAGING | Facility: HOSPITAL | Age: 65
End: 2025-04-25

## 2025-04-25 DIAGNOSIS — N18.1 CHRONIC KIDNEY DISEASE, STAGE 1: ICD-10-CM

## 2025-04-25 PROCEDURE — 76770 US EXAM ABDO BACK WALL COMP: CPT

## 2025-04-25 PROCEDURE — 76770 US EXAM ABDO BACK WALL COMP: CPT | Mod: 26

## 2025-05-01 ENCOUNTER — INPATIENT (INPATIENT)
Facility: HOSPITAL | Age: 65
LOS: 12 days | Discharge: NURSING, REHAB OR LONG-TERM CARE | DRG: 869 | End: 2025-05-14
Attending: INTERNAL MEDICINE | Admitting: STUDENT IN AN ORGANIZED HEALTH CARE EDUCATION/TRAINING PROGRAM
Payer: MEDICARE

## 2025-05-01 VITALS
WEIGHT: 270.07 LBS | HEIGHT: 74 IN | OXYGEN SATURATION: 97 % | HEART RATE: 100 BPM | TEMPERATURE: 98 F | RESPIRATION RATE: 20 BRPM | DIASTOLIC BLOOD PRESSURE: 81 MMHG | SYSTOLIC BLOOD PRESSURE: 128 MMHG

## 2025-05-01 DIAGNOSIS — R89.9 UNSPECIFIED ABNORMAL FINDING IN SPECIMENS FROM OTHER ORGANS, SYSTEMS AND TISSUES: ICD-10-CM

## 2025-05-01 LAB
ALBUMIN SERPL ELPH-MCNC: 3.1 G/DL — LOW (ref 3.3–5)
ALP SERPL-CCNC: 103 U/L — SIGNIFICANT CHANGE UP (ref 40–120)
ALT FLD-CCNC: 14 U/L — SIGNIFICANT CHANGE UP (ref 10–45)
ANION GAP SERPL CALC-SCNC: 20 MMOL/L — HIGH (ref 5–17)
ANISOCYTOSIS BLD QL: SLIGHT — SIGNIFICANT CHANGE UP
AST SERPL-CCNC: 13 U/L — SIGNIFICANT CHANGE UP (ref 10–40)
BASOPHILS # BLD AUTO: 0 K/UL — SIGNIFICANT CHANGE UP (ref 0–0.2)
BASOPHILS NFR BLD AUTO: 0 % — SIGNIFICANT CHANGE UP (ref 0–2)
BILIRUB SERPL-MCNC: 0.6 MG/DL — SIGNIFICANT CHANGE UP (ref 0.2–1.2)
BUN SERPL-MCNC: 137 MG/DL — HIGH (ref 7–23)
CALCIUM SERPL-MCNC: 9.1 MG/DL — SIGNIFICANT CHANGE UP (ref 8.4–10.5)
CHLORIDE SERPL-SCNC: 104 MMOL/L — SIGNIFICANT CHANGE UP (ref 96–108)
CO2 SERPL-SCNC: 16 MMOL/L — LOW (ref 22–31)
CREAT SERPL-MCNC: 5.85 MG/DL — HIGH (ref 0.5–1.3)
EGFR: 10 ML/MIN/1.73M2 — LOW
EGFR: 10 ML/MIN/1.73M2 — LOW
EOSINOPHIL # BLD AUTO: 0 K/UL — SIGNIFICANT CHANGE UP (ref 0–0.5)
EOSINOPHIL NFR BLD AUTO: 0 % — SIGNIFICANT CHANGE UP (ref 0–6)
GAS PNL BLDV: SIGNIFICANT CHANGE UP
GLUCOSE SERPL-MCNC: 104 MG/DL — HIGH (ref 70–99)
HCT VFR BLD CALC: 28.5 % — LOW (ref 39–50)
HGB BLD-MCNC: 8.5 G/DL — LOW (ref 13–17)
LYMPHOCYTES # BLD AUTO: 0.47 K/UL — LOW (ref 1–3.3)
LYMPHOCYTES # BLD AUTO: 5 % — LOW (ref 13–44)
MAGNESIUM SERPL-MCNC: 2.3 MG/DL — SIGNIFICANT CHANGE UP (ref 1.6–2.6)
MANUAL SMEAR VERIFICATION: SIGNIFICANT CHANGE UP
MCHC RBC-ENTMCNC: 29.3 PG — SIGNIFICANT CHANGE UP (ref 27–34)
MCHC RBC-ENTMCNC: 29.8 G/DL — LOW (ref 32–36)
MCV RBC AUTO: 98.3 FL — SIGNIFICANT CHANGE UP (ref 80–100)
MICROCYTES BLD QL: SLIGHT — SIGNIFICANT CHANGE UP
MONOCYTES # BLD AUTO: 0.85 K/UL — SIGNIFICANT CHANGE UP (ref 0–0.9)
MONOCYTES NFR BLD AUTO: 9 % — SIGNIFICANT CHANGE UP (ref 2–14)
NEUTROPHILS # BLD AUTO: 8.16 K/UL — HIGH (ref 1.8–7.4)
NEUTROPHILS NFR BLD AUTO: 86 % — HIGH (ref 43–77)
NRBC # BLD: 0 /100 WBCS — SIGNIFICANT CHANGE UP (ref 0–0)
NRBC BLD-RTO: 0 /100 WBCS — SIGNIFICANT CHANGE UP (ref 0–0)
OVALOCYTES BLD QL SMEAR: SLIGHT — SIGNIFICANT CHANGE UP
PHOSPHATE SERPL-MCNC: 6.3 MG/DL — HIGH (ref 2.5–4.5)
PLAT MORPH BLD: NORMAL — SIGNIFICANT CHANGE UP
PLATELET # BLD AUTO: 231 K/UL — SIGNIFICANT CHANGE UP (ref 150–400)
POIKILOCYTOSIS BLD QL AUTO: SLIGHT — SIGNIFICANT CHANGE UP
POTASSIUM SERPL-MCNC: 4.9 MMOL/L — SIGNIFICANT CHANGE UP (ref 3.5–5.3)
POTASSIUM SERPL-SCNC: 4.9 MMOL/L — SIGNIFICANT CHANGE UP (ref 3.5–5.3)
PROT SERPL-MCNC: 7 G/DL — SIGNIFICANT CHANGE UP (ref 6–8.3)
RBC # BLD: 2.9 M/UL — LOW (ref 4.2–5.8)
RBC # FLD: 13.7 % — SIGNIFICANT CHANGE UP (ref 10.3–14.5)
RBC BLD AUTO: ABNORMAL
SCHISTOCYTES BLD QL AUTO: SLIGHT — SIGNIFICANT CHANGE UP
SODIUM SERPL-SCNC: 140 MMOL/L — SIGNIFICANT CHANGE UP (ref 135–145)
WBC # BLD: 9.49 K/UL — SIGNIFICANT CHANGE UP (ref 3.8–10.5)
WBC # FLD AUTO: 9.49 K/UL — SIGNIFICANT CHANGE UP (ref 3.8–10.5)

## 2025-05-01 PROCEDURE — 99285 EMERGENCY DEPT VISIT HI MDM: CPT | Mod: GC

## 2025-05-01 PROCEDURE — 93010 ELECTROCARDIOGRAM REPORT: CPT

## 2025-05-01 RX ORDER — LIDOCAINE HYDROCHLORIDE 20 MG/ML
1 JELLY TOPICAL ONCE
Refills: 0 | Status: COMPLETED | OUTPATIENT
Start: 2025-05-01 | End: 2025-05-01

## 2025-05-01 RX ORDER — ACETAMINOPHEN 500 MG/5ML
1000 LIQUID (ML) ORAL ONCE
Refills: 0 | Status: COMPLETED | OUTPATIENT
Start: 2025-05-01 | End: 2025-05-01

## 2025-05-01 RX ORDER — SODIUM BICARBONATE 1 MEQ/ML
650 SYRINGE (ML) INTRAVENOUS THREE TIMES A DAY
Refills: 0 | Status: DISCONTINUED | OUTPATIENT
Start: 2025-05-01 | End: 2025-05-04

## 2025-05-01 RX ADMIN — Medication 400 MILLIGRAM(S): at 17:26

## 2025-05-01 RX ADMIN — Medication 666.67 MILLILITER(S): at 17:26

## 2025-05-01 RX ADMIN — LIDOCAINE HYDROCHLORIDE 1 PATCH: 20 JELLY TOPICAL at 17:26

## 2025-05-01 NOTE — ED ADULT NURSE REASSESSMENT NOTE - NS ED NURSE REASSESS COMMENT FT1
Patient reports pain that is "everywhere", worse in the L knee. Medications given per order. Patient updated on plan of care.

## 2025-05-01 NOTE — ED PROVIDER NOTE - OBJECTIVE STATEMENT
Saint Jair  (PGY2): 64-year-old male, with a history of type 2 diabetes on Januvia, hypertension, hyperlipidemia, gout, A-fib on Eliquis, CKD not on dialysis (last documented Cr 2.82 on 4/5/25), severe OA, recent admission for fall discharged to [facility] rehab, brought in by EMS from [facility] for abnormal lab results. Found to have elevated BUN/Cr 130/5.98. Patient's kidney function has been slowly increasing over the last few weeks. Spoke with Dr. Egan, patient was seen by Dr. May at [facility] who recommend renal US. Patient found to have cyst and mass in right kidney. Patient, himself, currently reporting pain in his buttocks, unchanged from prior. Otherwise, no other symptoms, including chest pain, SOB, lightheadedness, dizziness, neurological symptoms, abdominal pain, back pain, nausea, vomiting, diarrhea or constipation.

## 2025-05-01 NOTE — ED ADULT NURSE NOTE - OBJECTIVE STATEMENT
63 yo M presents to ED A+OX3 via EMS from [facility] for abnormal labs. Per EMS, patient had routine lab work done this morning, came back showing elevated BUN and creatinine and patient was sent to ED for further evaluation. Patient denies burning with urination, urinary frequency, fever, chills, back pain, abdominal pain. Breathing is spontaneous and unlabored on room air. Skin warm pink and dry. Bed in lowest position, side rails up.

## 2025-05-01 NOTE — ED PROVIDER NOTE - MDM ORDERS SUBMITTED SELECTION
Pt on Bipap most of NOC. Desats quickly when taken off. BS course/crackles throughout. Will cont to follow.    10/6/2017  Arlet Vergara RT     Medications

## 2025-05-01 NOTE — ED ADULT NURSE REASSESSMENT NOTE - NS ED NURSE REASSESS COMMENT FT1
Patient found in stretcher breathing spontaneously and unlabored on Room Air. No signs of respiratory distress @ this time. The patient is alert and orientated x4 and responds appropriately. The patient presents with a Right Forearm 20G PIV that is C/D/I and saline locked @ this time. Note patient found incontinent and saturated with urine. RN bedside to perform change but patient also endorsed having to void presently and emptied about 300cc of brown cloudy urine via urinal. Perineal care provided and new diaper and linens in place. Patient Left Thigh noted to present severely ecchymotic and patient notes pain with movement. LLE placed elevated upon a pillow at this time. Patients noted to present with abrasions to his backside as well as a rectum site that presents enlarged.  Pt denies chest pain, palpitations, shortness of breath, headache, visual disturbances, numbness/tingling, fever, chills, diaphoresis,  nausea, vomiting, constipation, diarrhea, or urinary symptoms. Safety and comfort measures provided, bed locked and in lowest position, side rails up for safety. VS to order and Transport bedside to take patient to [facility].

## 2025-05-01 NOTE — ED PROVIDER NOTE - CLINICAL SUMMARY MEDICAL DECISION MAKING FREE TEXT BOX
Saint JairDO (PGY2): well-appearing 64-year-old male, with a history of type 2 diabetes on Januvia, hypertension, hyperlipidemia, gout, A-fib on Eliquis, CKD not on dialysis (last documented Cr 2.82 on 4/5/25), severe OA, recent admission for fall discharged to [facility] rehab, brought in by EMS from [facility] for abnormal lab results. Vitals stable. No symptoms reported at this time. I reviewed the patient's labs from [facility] (obtained today at 0900) - Creatinine 5.98. Patient with hemoglobin of 7.1, baseline seems to be around 10. Reports no bleeding, including melena and hematochezia. Will get in touch with patient's nephrologist for recs.

## 2025-05-01 NOTE — ED PROVIDER NOTE - PHYSICAL EXAMINATION
GENERAL: no acute distress, non-toxic appearing  HEAD: normocephalic, atraumatic  HEENT: oral mucosa moist, full ROM of neck  CARDIAC: regular rate rhythm, normal S1/S2  CHEST: CTA BL, no wheeze or crackles  ABDOMEN: normal BS, soft, no tenderness  EXTREMITY: no gross deformity, no edema, good perfusion. Skin slightly discolored in bilateral LE  NEURO: alert and orientedx3, no focal deficits

## 2025-05-01 NOTE — ED PROVIDER NOTE - PROGRESS NOTE DETAILS
Saint Jair, DO (PGY2): spoke with Dr. May. States that patient's worsening kidney function unable to be managed at rehab. Would like patient admitted for possible dialysis in the next couple of days. Saint Jair, DO (PGY2): spoke with Dr. May. States that patient's worsening kidney function unable to be managed at rehab. Would like patient admitted for possible dialysis in the next couple of days. Will obtain labs for admission. Saint Jair (PGY2): Case discussed with hospitalist. Patient admitted. Saint Jair (PGY2): Patient stable, updated regarding plans to admit for further workup. In agreement with plan. All questions answered.

## 2025-05-01 NOTE — ED ADULT NURSE NOTE - NSICDXPASTSURGICALHX_GEN_ALL_CORE_FT
PAST SURGICAL HISTORY:  Hernia ri repair  2007    History of Arthroscopy of Right Knee x2 9/00, 9/05, Left knee 11/99    History of Cataract Surgery Left 01, Right 2000    Right Quadriceps Tendon Rupture repair 5/98

## 2025-05-01 NOTE — ED ADULT NURSE NOTE - NSFALLRISKINTERV_ED_ALL_ED
Assistance OOB with selected safe patient handling equipment if applicable/Assistance with ambulation/Communicate fall risk and risk factors to all staff, patient, and family/Monitor gait and stability/Provide visual cue: yellow wristband, yellow gown, etc/Reinforce activity limits and safety measures with patient and family/Call bell, personal items and telephone in reach/Instruct patient to call for assistance before getting out of bed/chair/stretcher/Non-slip footwear applied when patient is off stretcher/[place] to call system/Physically safe environment - no spills, clutter or unnecessary equipment/Purposeful Proactive Rounding/Room/bathroom lighting operational, light cord in reach

## 2025-05-01 NOTE — ED PROVIDER NOTE - ATTENDING CONTRIBUTION TO CARE
This is a 64-year-old gentleman who is currently a resident at [facility] and his creatinine has been slowly elevating.  Lower extremity edema White flaky skin  Patient mental status is clear and his neurological exam is normal.  At this time the patient has no absolute indications for dialysis however the facilities that is incapable of starting dialysis as he has no access and they do not feel comfortable managing him with end-stage kidney disease here.  Will admit at the request and we discussed the case with his private nephrologist Dr. Hilton May.–Kang May

## 2025-05-01 NOTE — ED ADULT NURSE NOTE - NSICDXPASTMEDICALHX_GEN_ALL_CORE_FT
PAST MEDICAL HISTORY:  Cataract bilateral    Chronic atrial fibrillation     Chronic kidney disease, unspecified CKD stage     Gout     HTN - Hypertension     Hyperlipidemia     Inguinal Hernia Right 12/07    Obese     Prostate cancer     Quadriceps Tendon Rupture 5/98    Retinal Detachment bilateral 2000, 2001    Type 2 diabetes mellitus

## 2025-05-02 DIAGNOSIS — R53.81 OTHER MALAISE: ICD-10-CM

## 2025-05-02 DIAGNOSIS — I10 ESSENTIAL (PRIMARY) HYPERTENSION: ICD-10-CM

## 2025-05-02 DIAGNOSIS — Z85.46 PERSONAL HISTORY OF MALIGNANT NEOPLASM OF PROSTATE: ICD-10-CM

## 2025-05-02 DIAGNOSIS — E11.9 TYPE 2 DIABETES MELLITUS WITHOUT COMPLICATIONS: ICD-10-CM

## 2025-05-02 DIAGNOSIS — D64.9 ANEMIA, UNSPECIFIED: ICD-10-CM

## 2025-05-02 DIAGNOSIS — I48.20 CHRONIC ATRIAL FIBRILLATION, UNSPECIFIED: ICD-10-CM

## 2025-05-02 DIAGNOSIS — N28.9 DISORDER OF KIDNEY AND URETER, UNSPECIFIED: ICD-10-CM

## 2025-05-02 DIAGNOSIS — Z29.9 ENCOUNTER FOR PROPHYLACTIC MEASURES, UNSPECIFIED: ICD-10-CM

## 2025-05-02 DIAGNOSIS — N17.9 ACUTE KIDNEY FAILURE, UNSPECIFIED: ICD-10-CM

## 2025-05-02 DIAGNOSIS — N28.1 CYST OF KIDNEY, ACQUIRED: ICD-10-CM

## 2025-05-02 LAB
ANION GAP SERPL CALC-SCNC: 18 MMOL/L — HIGH (ref 5–17)
ANION GAP SERPL CALC-SCNC: 19 MMOL/L — HIGH (ref 5–17)
APPEARANCE UR: ABNORMAL
BACTERIA # UR AUTO: NEGATIVE /HPF — SIGNIFICANT CHANGE UP
BASOPHILS # BLD AUTO: 0.01 K/UL — SIGNIFICANT CHANGE UP (ref 0–0.2)
BASOPHILS # BLD AUTO: 0.02 K/UL — SIGNIFICANT CHANGE UP (ref 0–0.2)
BASOPHILS NFR BLD AUTO: 0.1 % — SIGNIFICANT CHANGE UP (ref 0–2)
BASOPHILS NFR BLD AUTO: 0.3 % — SIGNIFICANT CHANGE UP (ref 0–2)
BILIRUB UR-MCNC: NEGATIVE — SIGNIFICANT CHANGE UP
BLD GP AB SCN SERPL QL: NEGATIVE — SIGNIFICANT CHANGE UP
BUN SERPL-MCNC: 125 MG/DL — HIGH (ref 7–23)
BUN SERPL-MCNC: 144 MG/DL — HIGH (ref 7–23)
CALCIUM SERPL-MCNC: 8.5 MG/DL — SIGNIFICANT CHANGE UP (ref 8.4–10.5)
CALCIUM SERPL-MCNC: 8.6 MG/DL — SIGNIFICANT CHANGE UP (ref 8.4–10.5)
CAST: 0 /LPF — SIGNIFICANT CHANGE UP (ref 0–4)
CHLORIDE SERPL-SCNC: 106 MMOL/L — SIGNIFICANT CHANGE UP (ref 96–108)
CHLORIDE SERPL-SCNC: 109 MMOL/L — HIGH (ref 96–108)
CO2 SERPL-SCNC: 16 MMOL/L — LOW (ref 22–31)
CO2 SERPL-SCNC: 16 MMOL/L — LOW (ref 22–31)
COLOR SPEC: ABNORMAL
CREAT ?TM UR-MCNC: 48 MG/DL — SIGNIFICANT CHANGE UP
CREAT SERPL-MCNC: 5.76 MG/DL — HIGH (ref 0.5–1.3)
CREAT SERPL-MCNC: 5.78 MG/DL — HIGH (ref 0.5–1.3)
DIFF PNL FLD: ABNORMAL
EGFR: 10 ML/MIN/1.73M2 — LOW
EOSINOPHIL # BLD AUTO: 0.15 K/UL — SIGNIFICANT CHANGE UP (ref 0–0.5)
EOSINOPHIL # BLD AUTO: 0.15 K/UL — SIGNIFICANT CHANGE UP (ref 0–0.5)
EOSINOPHIL NFR BLD AUTO: 1.9 % — SIGNIFICANT CHANGE UP (ref 0–6)
EOSINOPHIL NFR BLD AUTO: 1.9 % — SIGNIFICANT CHANGE UP (ref 0–6)
FERRITIN SERPL-MCNC: 463 NG/ML — HIGH (ref 30–400)
FOLATE SERPL-MCNC: 5.1 NG/ML — SIGNIFICANT CHANGE UP
GAS PNL BLDV: SIGNIFICANT CHANGE UP
GLUCOSE BLDC GLUCOMTR-MCNC: 109 MG/DL — HIGH (ref 70–99)
GLUCOSE BLDC GLUCOMTR-MCNC: 116 MG/DL — HIGH (ref 70–99)
GLUCOSE BLDC GLUCOMTR-MCNC: 119 MG/DL — HIGH (ref 70–99)
GLUCOSE BLDC GLUCOMTR-MCNC: 122 MG/DL — HIGH (ref 70–99)
GLUCOSE BLDC GLUCOMTR-MCNC: 97 MG/DL — SIGNIFICANT CHANGE UP (ref 70–99)
GLUCOSE SERPL-MCNC: 104 MG/DL — HIGH (ref 70–99)
GLUCOSE SERPL-MCNC: 111 MG/DL — HIGH (ref 70–99)
GLUCOSE UR QL: NEGATIVE MG/DL — SIGNIFICANT CHANGE UP
HCT VFR BLD CALC: 23.5 % — LOW (ref 39–50)
HCT VFR BLD CALC: 24.7 % — LOW (ref 39–50)
HGB BLD-MCNC: 7.4 G/DL — LOW (ref 13–17)
HGB BLD-MCNC: 7.5 G/DL — LOW (ref 13–17)
IMM GRANULOCYTES NFR BLD AUTO: 0.4 % — SIGNIFICANT CHANGE UP (ref 0–0.9)
IMM GRANULOCYTES NFR BLD AUTO: 0.5 % — SIGNIFICANT CHANGE UP (ref 0–0.9)
IRON SATN MFR SERPL: 28 % — SIGNIFICANT CHANGE UP (ref 16–55)
IRON SATN MFR SERPL: 36 UG/DL — LOW (ref 45–165)
KETONES UR-MCNC: NEGATIVE MG/DL — SIGNIFICANT CHANGE UP
LEUKOCYTE ESTERASE UR-ACNC: ABNORMAL
LYMPHOCYTES # BLD AUTO: 0.3 K/UL — LOW (ref 1–3.3)
LYMPHOCYTES # BLD AUTO: 0.31 K/UL — LOW (ref 1–3.3)
LYMPHOCYTES # BLD AUTO: 3.8 % — LOW (ref 13–44)
LYMPHOCYTES # BLD AUTO: 4 % — LOW (ref 13–44)
MAGNESIUM SERPL-MCNC: 2.1 MG/DL — SIGNIFICANT CHANGE UP (ref 1.6–2.6)
MAGNESIUM SERPL-MCNC: 2.2 MG/DL — SIGNIFICANT CHANGE UP (ref 1.6–2.6)
MCHC RBC-ENTMCNC: 28.8 PG — SIGNIFICANT CHANGE UP (ref 27–34)
MCHC RBC-ENTMCNC: 29.5 PG — SIGNIFICANT CHANGE UP (ref 27–34)
MCHC RBC-ENTMCNC: 30.4 G/DL — LOW (ref 32–36)
MCHC RBC-ENTMCNC: 31.5 G/DL — LOW (ref 32–36)
MCV RBC AUTO: 93.6 FL — SIGNIFICANT CHANGE UP (ref 80–100)
MCV RBC AUTO: 95 FL — SIGNIFICANT CHANGE UP (ref 80–100)
MONOCYTES # BLD AUTO: 0.7 K/UL — SIGNIFICANT CHANGE UP (ref 0–0.9)
MONOCYTES # BLD AUTO: 0.84 K/UL — SIGNIFICANT CHANGE UP (ref 0–0.9)
MONOCYTES NFR BLD AUTO: 10.7 % — SIGNIFICANT CHANGE UP (ref 2–14)
MONOCYTES NFR BLD AUTO: 9.1 % — SIGNIFICANT CHANGE UP (ref 2–14)
NEUTROPHILS # BLD AUTO: 6.49 K/UL — SIGNIFICANT CHANGE UP (ref 1.8–7.4)
NEUTROPHILS # BLD AUTO: 6.51 K/UL — SIGNIFICANT CHANGE UP (ref 1.8–7.4)
NEUTROPHILS NFR BLD AUTO: 82.9 % — HIGH (ref 43–77)
NEUTROPHILS NFR BLD AUTO: 84.4 % — HIGH (ref 43–77)
NITRITE UR-MCNC: NEGATIVE — SIGNIFICANT CHANGE UP
NRBC BLD AUTO-RTO: 0 /100 WBCS — SIGNIFICANT CHANGE UP (ref 0–0)
NRBC BLD AUTO-RTO: 0 /100 WBCS — SIGNIFICANT CHANGE UP (ref 0–0)
PH UR: 5.5 — SIGNIFICANT CHANGE UP (ref 5–8)
PHOSPHATE SERPL-MCNC: 5.9 MG/DL — HIGH (ref 2.5–4.5)
PHOSPHATE SERPL-MCNC: 6.5 MG/DL — HIGH (ref 2.5–4.5)
PLATELET # BLD AUTO: 194 K/UL — SIGNIFICANT CHANGE UP (ref 150–400)
PLATELET # BLD AUTO: 210 K/UL — SIGNIFICANT CHANGE UP (ref 150–400)
POTASSIUM SERPL-MCNC: 4.7 MMOL/L — SIGNIFICANT CHANGE UP (ref 3.5–5.3)
POTASSIUM SERPL-MCNC: 4.7 MMOL/L — SIGNIFICANT CHANGE UP (ref 3.5–5.3)
POTASSIUM SERPL-SCNC: 4.7 MMOL/L — SIGNIFICANT CHANGE UP (ref 3.5–5.3)
POTASSIUM SERPL-SCNC: 4.7 MMOL/L — SIGNIFICANT CHANGE UP (ref 3.5–5.3)
PROT ?TM UR-MCNC: 64 MG/DL — HIGH (ref 0–12)
PROT UR-MCNC: 100 MG/DL
PROT/CREAT UR-RTO: 1.3 RATIO — HIGH (ref 0–0.2)
RBC # BLD: 2.51 M/UL — LOW (ref 4.2–5.8)
RBC # BLD: 2.6 M/UL — LOW (ref 4.2–5.8)
RBC # BLD: 2.6 M/UL — LOW (ref 4.2–5.8)
RBC # FLD: 13.8 % — SIGNIFICANT CHANGE UP (ref 10.3–14.5)
RBC # FLD: 13.8 % — SIGNIFICANT CHANGE UP (ref 10.3–14.5)
RBC CASTS # UR COMP ASSIST: 129 /HPF — HIGH (ref 0–4)
RETICS #: 43.5 K/UL — SIGNIFICANT CHANGE UP (ref 25–125)
RETICS/RBC NFR: 1.7 % — SIGNIFICANT CHANGE UP (ref 0.5–2.5)
RH IG SCN BLD-IMP: POSITIVE — SIGNIFICANT CHANGE UP
SODIUM SERPL-SCNC: 141 MMOL/L — SIGNIFICANT CHANGE UP (ref 135–145)
SODIUM SERPL-SCNC: 143 MMOL/L — SIGNIFICANT CHANGE UP (ref 135–145)
SODIUM UR-SCNC: 48 MMOL/L — SIGNIFICANT CHANGE UP
SP GR SPEC: 1.01 — SIGNIFICANT CHANGE UP (ref 1–1.03)
SQUAMOUS # UR AUTO: 3 /HPF — SIGNIFICANT CHANGE UP (ref 0–5)
TIBC SERPL-MCNC: 125 UG/DL — LOW (ref 220–430)
UIBC SERPL-MCNC: 90 UG/DL — LOW (ref 110–370)
UROBILINOGEN FLD QL: 0.2 MG/DL — SIGNIFICANT CHANGE UP (ref 0.2–1)
UUN UR-MCNC: 371 MG/DL — SIGNIFICANT CHANGE UP
VIT B12 SERPL-MCNC: 497 PG/ML — SIGNIFICANT CHANGE UP (ref 232–1245)
WBC # BLD: 7.72 K/UL — SIGNIFICANT CHANGE UP (ref 3.8–10.5)
WBC # BLD: 7.83 K/UL — SIGNIFICANT CHANGE UP (ref 3.8–10.5)
WBC # FLD AUTO: 7.72 K/UL — SIGNIFICANT CHANGE UP (ref 3.8–10.5)
WBC # FLD AUTO: 7.83 K/UL — SIGNIFICANT CHANGE UP (ref 3.8–10.5)
WBC UR QL: 8 /HPF — HIGH (ref 0–5)

## 2025-05-02 PROCEDURE — 99223 1ST HOSP IP/OBS HIGH 75: CPT | Mod: GC

## 2025-05-02 PROCEDURE — 74176 CT ABD & PELVIS W/O CONTRAST: CPT | Mod: 26

## 2025-05-02 PROCEDURE — 99221 1ST HOSP IP/OBS SF/LOW 40: CPT

## 2025-05-02 PROCEDURE — 99233 SBSQ HOSP IP/OBS HIGH 50: CPT | Mod: GC,25

## 2025-05-02 RX ORDER — FERROUS SULFATE 137(45) MG
325 TABLET, EXTENDED RELEASE ORAL
Refills: 0 | DISCHARGE

## 2025-05-02 RX ORDER — INSULIN LISPRO 100 U/ML
INJECTION, SOLUTION INTRAVENOUS; SUBCUTANEOUS
Refills: 0 | Status: DISCONTINUED | OUTPATIENT
Start: 2025-05-02 | End: 2025-05-02

## 2025-05-02 RX ORDER — APIXABAN 2.5 MG/1
5 TABLET, FILM COATED ORAL
Refills: 0 | Status: DISCONTINUED | OUTPATIENT
Start: 2025-05-02 | End: 2025-05-14

## 2025-05-02 RX ORDER — SODIUM CHLORIDE 9 G/1000ML
1000 INJECTION, SOLUTION INTRAVENOUS
Refills: 0 | Status: DISCONTINUED | OUTPATIENT
Start: 2025-05-02 | End: 2025-05-02

## 2025-05-02 RX ORDER — TRAMADOL HYDROCHLORIDE 50 MG/1
50 TABLET, FILM COATED ORAL EVERY 6 HOURS
Refills: 0 | Status: DISCONTINUED | OUTPATIENT
Start: 2025-05-02 | End: 2025-05-02

## 2025-05-02 RX ORDER — DEXTROSE 50 % IN WATER 50 %
25 SYRINGE (ML) INTRAVENOUS ONCE
Refills: 0 | Status: DISCONTINUED | OUTPATIENT
Start: 2025-05-02 | End: 2025-05-02

## 2025-05-02 RX ORDER — TRAMADOL HYDROCHLORIDE 50 MG/1
50 TABLET, FILM COATED ORAL EVERY 12 HOURS
Refills: 0 | Status: DISCONTINUED | OUTPATIENT
Start: 2025-05-02 | End: 2025-05-03

## 2025-05-02 RX ORDER — POLYETHYLENE GLYCOL 3350 17 G/17G
17 POWDER, FOR SOLUTION ORAL DAILY
Refills: 0 | Status: DISCONTINUED | OUTPATIENT
Start: 2025-05-02 | End: 2025-05-03

## 2025-05-02 RX ORDER — DEXTROSE 50 % IN WATER 50 %
15 SYRINGE (ML) INTRAVENOUS ONCE
Refills: 0 | Status: DISCONTINUED | OUTPATIENT
Start: 2025-05-02 | End: 2025-05-02

## 2025-05-02 RX ORDER — DEXTROSE 50 % IN WATER 50 %
12.5 SYRINGE (ML) INTRAVENOUS ONCE
Refills: 0 | Status: DISCONTINUED | OUTPATIENT
Start: 2025-05-02 | End: 2025-05-02

## 2025-05-02 RX ORDER — LIDOCAINE HYDROCHLORIDE 20 MG/ML
1 JELLY TOPICAL DAILY
Refills: 0 | Status: DISCONTINUED | OUTPATIENT
Start: 2025-05-02 | End: 2025-05-14

## 2025-05-02 RX ORDER — METOPROLOL SUCCINATE 50 MG/1
100 TABLET, EXTENDED RELEASE ORAL DAILY
Refills: 0 | Status: DISCONTINUED | OUTPATIENT
Start: 2025-05-02 | End: 2025-05-14

## 2025-05-02 RX ORDER — METOPROLOL SUCCINATE 50 MG/1
100 TABLET, EXTENDED RELEASE ORAL DAILY
Refills: 0 | Status: DISCONTINUED | OUTPATIENT
Start: 2025-05-02 | End: 2025-05-02

## 2025-05-02 RX ORDER — APIXABAN 2.5 MG/1
1 TABLET, FILM COATED ORAL
Refills: 0 | DISCHARGE

## 2025-05-02 RX ORDER — FERROUS SULFATE 137(45) MG
325 TABLET, EXTENDED RELEASE ORAL DAILY
Refills: 0 | Status: DISCONTINUED | OUTPATIENT
Start: 2025-05-02 | End: 2025-05-14

## 2025-05-02 RX ORDER — AMLODIPINE BESYLATE 10 MG/1
1 TABLET ORAL
Refills: 0 | DISCHARGE

## 2025-05-02 RX ORDER — SEVELAMER HYDROCHLORIDE 800 MG/1
800 TABLET ORAL EVERY 12 HOURS
Refills: 0 | Status: COMPLETED | OUTPATIENT
Start: 2025-05-02 | End: 2025-05-03

## 2025-05-02 RX ORDER — INSULIN LISPRO 100 U/ML
INJECTION, SOLUTION INTRAVENOUS; SUBCUTANEOUS AT BEDTIME
Refills: 0 | Status: DISCONTINUED | OUTPATIENT
Start: 2025-05-02 | End: 2025-05-02

## 2025-05-02 RX ORDER — GLUCAGON 3 MG/1
1 POWDER NASAL ONCE
Refills: 0 | Status: DISCONTINUED | OUTPATIENT
Start: 2025-05-02 | End: 2025-05-02

## 2025-05-02 RX ORDER — ACETAMINOPHEN 500 MG/5ML
650 LIQUID (ML) ORAL EVERY 6 HOURS
Refills: 0 | Status: DISCONTINUED | OUTPATIENT
Start: 2025-05-02 | End: 2025-05-03

## 2025-05-02 RX ORDER — SODIUM CHLORIDE 9 G/1000ML
1000 INJECTION, SOLUTION INTRAVENOUS
Refills: 0 | Status: DISCONTINUED | OUTPATIENT
Start: 2025-05-02 | End: 2025-05-03

## 2025-05-02 RX ORDER — SENNA 187 MG
2 TABLET ORAL AT BEDTIME
Refills: 0 | Status: DISCONTINUED | OUTPATIENT
Start: 2025-05-02 | End: 2025-05-14

## 2025-05-02 RX ORDER — METHOCARBAMOL 500 MG/1
750 TABLET, FILM COATED ORAL EVERY 12 HOURS
Refills: 0 | Status: DISCONTINUED | OUTPATIENT
Start: 2025-05-02 | End: 2025-05-14

## 2025-05-02 RX ADMIN — Medication 325 MILLIGRAM(S): at 12:56

## 2025-05-02 RX ADMIN — Medication 650 MILLIGRAM(S): at 07:07

## 2025-05-02 RX ADMIN — Medication 25 MILLIGRAM(S): at 00:32

## 2025-05-02 RX ADMIN — POLYETHYLENE GLYCOL 3350 17 GRAM(S): 17 POWDER, FOR SOLUTION ORAL at 12:56

## 2025-05-02 RX ADMIN — Medication 25 MILLIGRAM(S): at 22:00

## 2025-05-02 RX ADMIN — Medication 650 MILLIGRAM(S): at 13:07

## 2025-05-02 RX ADMIN — Medication 2 TABLET(S): at 22:00

## 2025-05-02 RX ADMIN — Medication 25 MILLIGRAM(S): at 13:07

## 2025-05-02 RX ADMIN — LIDOCAINE HYDROCHLORIDE 1 PATCH: 20 JELLY TOPICAL at 06:58

## 2025-05-02 RX ADMIN — TRAMADOL HYDROCHLORIDE 50 MILLIGRAM(S): 50 TABLET, FILM COATED ORAL at 10:03

## 2025-05-02 RX ADMIN — APIXABAN 5 MILLIGRAM(S): 2.5 TABLET, FILM COATED ORAL at 17:30

## 2025-05-02 RX ADMIN — METOPROLOL SUCCINATE 100 MILLIGRAM(S): 50 TABLET, EXTENDED RELEASE ORAL at 07:06

## 2025-05-02 RX ADMIN — APIXABAN 5 MILLIGRAM(S): 2.5 TABLET, FILM COATED ORAL at 07:07

## 2025-05-02 RX ADMIN — TRAMADOL HYDROCHLORIDE 50 MILLIGRAM(S): 50 TABLET, FILM COATED ORAL at 11:00

## 2025-05-02 RX ADMIN — LIDOCAINE HYDROCHLORIDE 1 PATCH: 20 JELLY TOPICAL at 07:01

## 2025-05-02 RX ADMIN — SEVELAMER HYDROCHLORIDE 800 MILLIGRAM(S): 800 TABLET ORAL at 17:31

## 2025-05-02 RX ADMIN — Medication 650 MILLIGRAM(S): at 21:59

## 2025-05-02 RX ADMIN — SEVELAMER HYDROCHLORIDE 800 MILLIGRAM(S): 800 TABLET ORAL at 13:04

## 2025-05-02 RX ADMIN — Medication 650 MILLIGRAM(S): at 00:32

## 2025-05-02 RX ADMIN — Medication 25 MILLIGRAM(S): at 07:07

## 2025-05-02 NOTE — PHYSICAL THERAPY INITIAL EVALUATION ADULT - PERTINENT HX OF CURRENT PROBLEM, REHAB EVAL
64-year-old male, with pmhx of type 2 diabetes, NTH, HLD, gout, A-fib on Eliquis, CKD not on dialysis (last documented Cr 2.82 on 4/5/25), severe OA, recent admission for fall discharged to [facility] rehab, brought in by EMS from [facility] for abnormal lab results. Found to have elevated BUN/Cr 130/5.98, C/f progressive CKD, vs DEEDEE on CKD. Renal US found cyst and mass in right kidney.

## 2025-05-02 NOTE — H&P ADULT - HISTORY OF PRESENT ILLNESS
65 YO M w/ PMH of DM2 (on Januvia), HTN, HLD, gout, A-fib on Eliquis, CKD not on dialysis (last documented Cr 2.82 on 4/5/25), severe OA, prostate cancer (s/p radiation), recent admission for fall discharged to [facility] rehab, brought in by EMS from [facility] for abnormal lab results (Cr 5.98).    Pt was hospitalized last month for a fall. During the prior hospitalization, also found worsened Cr, but unclear whether it was progressive CKD or DEEDEE on CKD. Pt had been in [facility] rehab since prior discharge. Today, found to have elevated BUN/Cr 130/5.98. Hgb 7.1 (baseline ~10). Pt endorses polyuria and L knee pain, which are his chronic conditions. Also reports pain in buttocks, unchanged from prior. Denies other symptoms, including melena/hematochezia, chest pain, dyspnea, lightheadedness, dizziness, neurological symptoms, abdominal pain, back pain, nausea, vomiting, diarrhea or constipation. 63 YO M w/ PMH of DM2 (on Januvia), HTN, HLD, gout, A-fib on Eliquis, CKD not on dialysis (last documented Cr 2.82 on 4/5/25), severe OA, prostate cancer (s/p radiation), recent admission for fall discharged to [facility] rehab, brought in by EMS from [facility] for abnormal lab results (Cr 5.98).    Pt was hospitalized last month for a fall. During the prior hospitalization, also found worsened Cr, but unclear whether it was progressive CKD or DEEDEE on CKD. Pt had been in [facility] rehab since prior discharge. Today, found to have elevated BUN/Cr 130/5.98, Hgb 7.1 (baseline ~10) at [facility]. Pt endorses polyuria and L knee pain, which are his chronic conditions. Also reports pain in buttocks, unchanged from prior. Denies other symptoms, including melena/hematochezia, chest pain, dyspnea, lightheadedness, dizziness, neurological symptoms, abdominal pain, back pain, nausea, vomiting, diarrhea or constipation.

## 2025-05-02 NOTE — PROGRESS NOTE ADULT - PROBLEM SELECTOR PLAN 9
-DVT Ppx: c/w Eliquis  -Diet: CC/DASH/TLC diet, pending nutrition recs    -Fall Precautions  -Dispo: Pending resolution of DEEDEE  -Wound Care d/c recs: "Pt will need Group 2 mattress on hospital bed and ROHO cushion for wheel chair upon discharge home"  -PT eval: pending

## 2025-05-02 NOTE — PROGRESS NOTE ADULT - SUBJECTIVE AND OBJECTIVE BOX
SUBJECTIVE / OVERNIGHT EVENTS:  Pt seen and examined at bedside. Admitted to medicine overnight for elevated Cr -> s/p 2.7L on straight catheterization.     MEDICATIONS  (STANDING):  apixaban 5 milliGRAM(s) Oral two times a day  dextrose 5%. 1000 milliLiter(s) (50 mL/Hr) IV Continuous <Continuous>  dextrose 5%. 1000 milliLiter(s) (100 mL/Hr) IV Continuous <Continuous>  dextrose 50% Injectable 25 Gram(s) IV Push once  dextrose 50% Injectable 12.5 Gram(s) IV Push once  dextrose 50% Injectable 25 Gram(s) IV Push once  ferrous    sulfate 325 milliGRAM(s) Oral daily  glucagon  Injectable 1 milliGRAM(s) IntraMuscular once  hydrALAZINE 25 milliGRAM(s) Oral every 8 hours  insulin lispro (ADMELOG) corrective regimen sliding scale   SubCutaneous three times a day before meals  insulin lispro (ADMELOG) corrective regimen sliding scale   SubCutaneous at bedtime  metoprolol succinate  milliGRAM(s) Oral daily  sodium bicarbonate 650 milliGRAM(s) Oral three times a day    MEDICATIONS  (PRN):  dextrose Oral Gel 15 Gram(s) Oral once PRN Blood Glucose LESS THAN 70 milliGRAM(s)/deciliter  lidocaine   4% Patch 1 Patch Transdermal daily PRN L knee pain        25 @ 07:01  -  25 @ 07:00  --------------------------------------------------------  IN: 0 mL / OUT: 2700 mL / NET: -2700 mL        PHYSICAL EXAM:  Vital Signs Last 24 Hrs  T(C): 36.9 (02 May 2025 04:42), Max: 36.9 (02 May 2025 04:42)  T(F): 98.5 (02 May 2025 04:42), Max: 98.5 (02 May 2025 04:42)  HR: 113 (02 May 2025 04:42) (87 - 113)  BP: 146/97 (02 May 2025 04:42) (103/68 - 146/97)  BP(mean): 80 (01 May 2025 20:05) (80 - 103)  RR: 19 (02 May 2025 04:42) (19 - 20)  SpO2: 100% (02 May 2025 04:42) (97% - 100%)    Parameters below as of 02 May 2025 04:42  Patient On (Oxygen Delivery Method): room air        CAPILLARY BLOOD GLUCOSE      POCT Blood Glucose.: 97 mg/dL (02 May 2025 00:36)    I&O's Summary    01 May 2025 07:01  -  02 May 2025 07:00  --------------------------------------------------------  IN: 0 mL / OUT: 2700 mL / NET: -2700 mL        CONSTITUTIONAL: NAD  HEENT: NC/AT  RESPIRATORY: Normal respiratory effort; lungs are clear to auscultation bilaterally  CARDIOVASCULAR: Regular rate and rhythm, normal S1 and S2, no murmur/rub/gallop; No lower extremity edema; Peripheral pulses are 2+ bilaterally  ABDOMEN: Nontender to palpation, normoactive bowel sounds, no rebound/guarding; No hepatosplenomegaly  MUSCLOSKELETAL: no clubbing or cyanosis of digits; no joint swelling or tenderness to palpation  EXTREMITIES: no peripheral edema, distal pulses intact   NEURO: no focal deficits   PSYCH: A+O to person, place, and time; affect appropriate    LABS:                        8.5    9.49  )-----------( 231      ( 01 May 2025 17:37 )             28.5     05-01    140  |  104  |  137[H]  ----------------------------<  104[H]  4.9   |  16[L]  |  5.85[H]    Ca    9.1      01 May 2025 17:37  Phos  6.3     05-  Mg     2.3     05-    TPro  7.0  /  Alb  3.1[L]  /  TBili  0.6  /  DBili  x   /  AST  13  /  ALT  14  /  AlkPhos  103  05-01          Urinalysis Basic - ( 02 May 2025 05:15 )    Color: Orange / Appearance: Cloudy / S.012 / pH: x  Gluc: x / Ketone: Negative mg/dL  / Bili: Negative / Urobili: 0.2 mg/dL   Blood: x / Protein: 100 mg/dL / Nitrite: Negative   Leuk Esterase: Trace / RBC: 129 /HPF / WBC 8 /HPF   Sq Epi: x / Non Sq Epi: 3 /HPF / Bacteria: Negative /HPF          IMAGING:    [X] All pertinent imaging reviewed by me SUBJECTIVE / OVERNIGHT EVENTS:  Pt seen and examined at bedside. Admitted to medicine overnight for elevated Cr -> s/p 2.7L on straight catheterization. No complaints this AM - states he feels unchanged and came in simply due to lab findings. On further ROS did state he has some anorexia and lethargy.    MEDICATIONS  (STANDING):  apixaban 5 milliGRAM(s) Oral two times a day  dextrose 5%. 1000 milliLiter(s) (50 mL/Hr) IV Continuous <Continuous>  dextrose 5%. 1000 milliLiter(s) (100 mL/Hr) IV Continuous <Continuous>  dextrose 50% Injectable 25 Gram(s) IV Push once  dextrose 50% Injectable 12.5 Gram(s) IV Push once  dextrose 50% Injectable 25 Gram(s) IV Push once  ferrous    sulfate 325 milliGRAM(s) Oral daily  glucagon  Injectable 1 milliGRAM(s) IntraMuscular once  hydrALAZINE 25 milliGRAM(s) Oral every 8 hours  insulin lispro (ADMELOG) corrective regimen sliding scale   SubCutaneous three times a day before meals  insulin lispro (ADMELOG) corrective regimen sliding scale   SubCutaneous at bedtime  metoprolol succinate  milliGRAM(s) Oral daily  sodium bicarbonate 650 milliGRAM(s) Oral three times a day    MEDICATIONS  (PRN):  dextrose Oral Gel 15 Gram(s) Oral once PRN Blood Glucose LESS THAN 70 milliGRAM(s)/deciliter  lidocaine   4% Patch 1 Patch Transdermal daily PRN L knee pain        25 @ 07:01  -  25 @ 07:00  --------------------------------------------------------  IN: 0 mL / OUT: 2700 mL / NET: -2700 mL        PHYSICAL EXAM:  Vital Signs Last 24 Hrs  T(C): 36.9 (02 May 2025 04:42), Max: 36.9 (02 May 2025 04:42)  T(F): 98.5 (02 May 2025 04:42), Max: 98.5 (02 May 2025 04:42)  HR: 113 (02 May 2025 04:42) (87 - 113)  BP: 146/97 (02 May 2025 04:42) (103/68 - 146/97)  BP(mean): 80 (01 May 2025 20:05) (80 - 103)  RR: 19 (02 May 2025 04:42) (19 - 20)  SpO2: 100% (02 May 2025 04:42) (97% - 100%)    Parameters below as of 02 May 2025 04:42  Patient On (Oxygen Delivery Method): room air        CAPILLARY BLOOD GLUCOSE      POCT Blood Glucose.: 97 mg/dL (02 May 2025 00:36)    I&O's Summary    01 May 2025 07:01  -  02 May 2025 07:00  --------------------------------------------------------  IN: 0 mL / OUT: 2700 mL / NET: -2700 mL        CONSTITUTIONAL: NAD  HEENT: NC/AT  RESPIRATORY: Normal respiratory effort; lungs are clear to auscultation bilaterally  CARDIOVASCULAR: Regular rate and rhythm, normal S1 and S2, no murmur/rub/gallop; No lower extremity edema; Peripheral pulses are 2+ bilaterally  ABDOMEN: Nontender to palpation, normoactive bowel sounds, no rebound/guarding; No hepatosplenomegaly  MUSCLOSKELETAL: no clubbing or cyanosis of digits; no joint swelling or tenderness to palpation  EXTREMITIES: trace edema, distal pulses intact   NEURO: no focal deficits   PSYCH: A+O to person, place, and time; affect appropriate    LABS:                        8.5    9.49  )-----------( 231      ( 01 May 2025 17:37 )             28.5     05-01    140  |  104  |  137[H]  ----------------------------<  104[H]  4.9   |  16[L]  |  5.85[H]    Ca    9.1      01 May 2025 17:37  Phos  6.3     05-  Mg     2.3     05-    TPro  7.0  /  Alb  3.1[L]  /  TBili  0.6  /  DBili  x   /  AST  13  /  ALT  14  /  AlkPhos  103  05-          Urinalysis Basic - ( 02 May 2025 05:15 )    Color: Orange / Appearance: Cloudy / S.012 / pH: x  Gluc: x / Ketone: Negative mg/dL  / Bili: Negative / Urobili: 0.2 mg/dL   Blood: x / Protein: 100 mg/dL / Nitrite: Negative   Leuk Esterase: Trace / RBC: 129 /HPF / WBC 8 /HPF   Sq Epi: x / Non Sq Epi: 3 /HPF / Bacteria: Negative /HPF          IMAGING:    [X] All pertinent imaging reviewed by me

## 2025-05-02 NOTE — CONSULT NOTE ADULT - SUBJECTIVE AND OBJECTIVE BOX
Wound SURGERY CONSULT NOTE    HPI:  65 YO M w/ PMH of DM2 (on Januvia), HTN, HLD, gout, A-fib on Eliquis, CKD not on dialysis (last documented Cr 2.82 on 4/5/25), severe OA, prostate cancer (s/p radiation), recent admission for fall discharged to [facility] rehab, brought in by EMS from [facility] for abnormal lab results (Cr 5.98).    Pt was hospitalized last month for a fall. During the prior hospitalization, also found worsened Cr, but unclear whether it was progressive CKD or DEEDEE on CKD. Pt had been in [facility] rehab since prior discharge. Today, found to have elevated BUN/Cr 130/5.98, Hgb 7.1 (baseline ~10) at [facility]. Pt endorses polyuria and L knee pain, which are his chronic conditions. Also reports pain in buttocks, unchanged from prior. Denies other symptoms, including melena/hematochezia, chest pain, dyspnea, lightheadedness, dizziness, neurological symptoms, abdominal pain, back pain, nausea, vomiting, diarrhea or constipation. (02 May 2025 01:44)        Wound consult requested by team to assist w/ management of skin injury. Pt found laying on low air loss specialty bed: I introduced myself and role.   Pt c/o pain and w/o c/o drainage, odor, color change,  or worsening swelling. Offloading and pericare initiated upon admission as pt Increasingly sedentary 2/2 to illness. Pt is Incontinent of urine & stool. (+)maldonado. All questions asked and answered to pt's expressed understanding and satisfaction. Safety maintained throughout consult.    Current Diet: Diet, Consistent Carbohydrate/No Snacks:   DASH/TLC Sodium & Cholesterol Restricted (DASH) (05-02-25 @ 03:24)      PAST MEDICAL & SURGICAL HISTORY:  HTN - Hypertension      Hyperlipidemia      Inguinal Hernia  Right 12/07      Obese      Retinal Detachment  bilateral 2000, 2001      Cataract  bilateral      Quadriceps Tendon Rupture  5/98      Chronic atrial fibrillation      Type 2 diabetes mellitus      Prostate cancer      Gout      Chronic kidney disease, unspecified CKD stage      History of Arthroscopy of Right Knee  x2 9/00, 9/05, Left knee 11/99      Right Quadriceps Tendon Rupture repair  5/98      History of Cataract Surgery  Left 01, Right 2000      Hernia  [facility] repair  2007          REVIEW OF SYSTEMS:   General/ Breast/ Skin/Vasc/ Neuro/ MSK: see HPI  All other systems negative    MEDICATIONS  (STANDING):  apixaban 5 milliGRAM(s) Oral two times a day  dextrose 5%. 1000 milliLiter(s) (50 mL/Hr) IV Continuous <Continuous>  dextrose 5%. 1000 milliLiter(s) (100 mL/Hr) IV Continuous <Continuous>  dextrose 50% Injectable 25 Gram(s) IV Push once  dextrose 50% Injectable 12.5 Gram(s) IV Push once  dextrose 50% Injectable 25 Gram(s) IV Push once  ferrous    sulfate 325 milliGRAM(s) Oral daily  glucagon  Injectable 1 milliGRAM(s) IntraMuscular once  hydrALAZINE 25 milliGRAM(s) Oral every 8 hours  insulin lispro (ADMELOG) corrective regimen sliding scale   SubCutaneous three times a day before meals  insulin lispro (ADMELOG) corrective regimen sliding scale   SubCutaneous at bedtime  metoprolol succinate  milliGRAM(s) Oral daily  polyethylene glycol 3350 17 Gram(s) Oral daily  senna 2 Tablet(s) Oral at bedtime  sevelamer carbonate 800 milliGRAM(s) Oral every 12 hours  sodium bicarbonate 650 milliGRAM(s) Oral three times a day    MEDICATIONS  (PRN):  acetaminophen     Tablet .. 650 milliGRAM(s) Oral every 6 hours PRN Temp greater or equal to 38C (100.4F), Mild Pain (1 - 3), Moderate Pain (4 - 6)  dextrose Oral Gel 15 Gram(s) Oral once PRN Blood Glucose LESS THAN 70 milliGRAM(s)/deciliter  lidocaine   4% Patch 1 Patch Transdermal daily PRN L knee pain  methocarbamol 750 milliGRAM(s) Oral every 12 hours PRN Muscle Spasm  traMADol 50 milliGRAM(s) Oral every 12 hours PRN Severe Pain (7 - 10)      Allergies    penicillin (Unknown)  clindamycin (Rash)    Intolerances        SOCIAL HISTORY:      - Lives alone prior to previous hospitalization for fall.   - Currently in [facility] rehab  - Denies ever smoking or recreational drug  - Rarely drinks alcohols      FAMILY HISTORY:    No pertinent family hx among first degree relatives.    PHYSICAL EXAM:  Vital Signs Last 24 Hrs  T(C): 36.9 (02 May 2025 04:42), Max: 36.9 (02 May 2025 04:42)  T(F): 98.5 (02 May 2025 04:42), Max: 98.5 (02 May 2025 04:42)  HR: 113 (02 May 2025 04:42) (87 - 113)  BP: 146/97 (02 May 2025 04:42) (103/68 - 146/97)  BP(mean): 80 (01 May 2025 20:05) (80 - 103)  RR: 19 (02 May 2025 04:42) (19 - 20)  SpO2: 100% (02 May 2025 04:42) (97% - 100%)    Parameters below as of 02 May 2025 04:42  Patient On (Oxygen Delivery Method): room air      NAD/  A&Ox3/ Obese/ frail,  Versa Care P500 bed  HEENT: sclera clear, mucosa moist, throat clear, trachea midline, neck supple  Respiratory: nonlabored w/ equal chest rise  Gastrointestinal: soft NT/ND   : (+)maldonado  Neurology:  verbal, follows commands  Psych: calm/ appropriate  Musculoskeletal:  limited stiff / p/ROM, no deformities/ contractures  Vascular: BLE equally warm, no cyanosis, clubbing, nor acute ischemia             BLE edema equal           BLE DP pulses bounding         BLE hemosiderin staining/ xerosis        Lt hip  ecchymosis, swollen w/o hematoma       Dystrophic grey toenails  Skin:  thin, dry, pale, frail  Rt ischium area of persistent deep dark purple skin 4.0cm x 5.0cm x 0.1cm   Lt ischium area of persistent deep dark purple skin 4.0cm x 3.0cm x 0.0 cm  sacral region/BL buttocks area of persistent deep dark purple skin 1.5cm x 1.5cm x 0.0cm      deb wound (+) erythema: There is no blistering, drainage  ,     increased warmth, tenderness, induration, fluctuance, nor crepitus    LABS/ CULTURES/ RADIOLOGY:                        7.5    7.72  )-----------( 210      ( 02 May 2025 09:56 )             24.7       141  |  106  |  125  ----------------------------<  104      [05-02-25 @ 09:56]  4.7   |  16  |  5.76        Ca     8.6     [05-02-25 @ 09:56]      Mg     2.1     [05-02-25 @ 09:56]      Phos  6.5     [05-02-25 @ 09:56]    TPro  7.0  /  Alb  3.1  /  TBili  0.6  /  DBili  x   /  AST  13  /  ALT  14  /  AlkPhos  103  [05-01-25 @ 17:37]            A1C with Estimated Average Glucose Result: 4.6 % (04-03-25 @ 07:38)

## 2025-05-02 NOTE — PROGRESS NOTE ADULT - PROBLEM SELECTOR PLAN 4
- A1c: 4.6 4/3/2025  - Home Januvia 50 mg daily    Plan:  - Holding home Januvia  - ISS. - A1c: 4.6 4/3/2025  - Home Januvia 50 mg daily    > Holding home Januvia  > ISS. - A1c: 4.6 4/3/2025  - Home Januvia 50 mg daily    > Holding home Januvia  > c/w ISS  > c/w POCT Blood Glucose pre-meal and at bedtime - Recent hospitalization after mechanical fall.  - Chronic L knee pain due to severe OA  - Home pain as-needed pain meds include: Acetaminophen; Tramadol 50mg Q8H for pain (4-6); methocarbamol 750mg BID; lidocaine patch.    > c/w PRN Lidocaine patch  > PO pain regimen as needed: Tylenol 650mg Q6H for mild/moderate pain PRN, Tramadol 50mg Q8H for severe pain PRN; methocarbamol 750mg BID for muscle spasm PRN  > c/w Fall precaution  > f/u Wound care consult  > f/u PT eval  > f/u Nutrition consult - Recent hospitalization after mechanical fall.  - Chronic L knee pain due to severe OA  - Home pain as-needed pain meds include: Acetaminophen; Tramadol 50mg Q8H for pain (4-6); methocarbamol 750mg BID; lidocaine patch.    > c/w PRN Lidocaine patch  > PO pain regimen as needed: Tylenol 650mg Q6H for mild/moderate pain PRN, Tramadol 50mg BID for severe pain PRN (renally-adjusted); methocarbamol 750mg BID for muscle spasm PRN  > c/w Fall precaution  > f/u Wound care consult  > f/u PT eval  > f/u Nutrition consult

## 2025-05-02 NOTE — H&P ADULT - PROBLEM SELECTOR PLAN 7
S/p radiation    Plan:  - Hold home Myrbetriq   - Outpatient f/u - S/p radiation  - Home Myrbetriq 50mg daily    Plan:  - Either oxybutynin 5mg BID, or pt can bring own home Myrbetriq     - Outpatient f/u - S/p radiation  - Home Myrbetriq 50mg daily    Plan:  - Either oxybutynin 5mg BID, or pt can bring own home Myrbetriq   - Outpatient f/u

## 2025-05-02 NOTE — H&P ADULT - ASSESSMENT
63 YO M w/ PMH of DM2 (on Januvia), HTN, HLD, gout, A-fib on Eliquis, CKD not on dialysis (last documented Cr 2.82 on 4/5/25), severe OA, prostate cancer (s/p radiation), recent admission for fall discharged to [facility]hab, presented with worsened creatinine, and decrease of hbg. C/f progressive CKD, vs DEEDEE on CKD.

## 2025-05-02 NOTE — PHYSICAL THERAPY INITIAL EVALUATION ADULT - MANUAL MUSCLE TESTING RESULTS, REHAB EVAL
BUE grossly at least 4/5 throughout , RLE 3-/5, LLE 2-/5, limited 2/2 c/o L knee pain/grossly assessed due to

## 2025-05-02 NOTE — CONSULT NOTE ADULT - SUBJECTIVE AND OBJECTIVE BOX
HPI: Mr. Armando is a 64 year-old man with history of multiple medical issues including hypertension, gout, atrial fibrillation, and CKD4-5. He is s/p admission at [facility] -25 with worsening arthritic left knee pain and s/p fall; he was discharged to [facility] for subacute rehabiliation. I was called to see him last week at [facility] given his rise in BUN/creatinine to 106/5.45 on 25. The etiology of his DEEDEE on CKD was not entirely obvious; we placed him on NS 70cc/h x 1L at [facility]; we set him up for a renal ultrasound, which he had through [facility] on 25. The ultrasound demonstrated various cysts, but no obvious hydronephrosis was appreciated on the imaging study. I was asked to follow up on him earlier this week; as his BUN/creatinine were no better than prior (123/5.57 on 25), I counseled him (and his sister Rony by phone) regarding his severe azotemia, and my concern that his kidneys failing/that he would require chronic HD in the near future. Labwork was repeated 25 and his BUN/creatinine were up to 130/5.98; therefore, he was sent to the ER for further evaluation/potential dialysis. Of note, whereas he has had fatigue/generalized weakness, he denied nausea, vomiting, or loss of appetite. He denies notable urinary changes of late. He periodically has followed in the past with nephrologist Dr. Mauricio Cortes; he last saw him 2-3 years ago, he reported.    Mr. Armando had a maldonado placed in the ER, with immediate output of 2700cc.        PAST MEDICAL & SURGICAL HISTORY:  HTN  HLD  DM2  AFib  CKD  Prostate CA  Gout  R inguinal hernia repair 2007  Retinal Detachment -bilateral ,   Cataract - bilateral  Quadriceps Tendon Rupture/repair -  R knee arthroscopy x2 (, ); Left knee arthroscopy x1 ()    Allergies  penicillin (Unknown)  clindamycin (Rash)    SOCIAL HISTORY:  Denies ETOh,Smoking,     FAMILY HISTORY:  No CKD    REVIEW OF SYSTEMS:  CONSTITUTIONAL: (+)generalized weakness, no fever/no chills  EYES/ENT: No visual changes;  No vertigo or throat pain   NECK: No pain or stiffness  RESPIRATORY: No cough, wheezing, hemoptysis; No shortness of breath  CARDIOVASCULAR: No chest pain or palpitations  GASTROINTESTINAL: No abdominal or epigastric pain. No nausea, vomiting, or hematemesis; No diarrhea or constipation. No melena or hematochezia.  GENITOURINARY: No dysuria, frequency or hematuria  MUSCULOSKELETAL: (+)L knee pain, (+)LE weakness  SKIN: No itching, burning, rashes, or lesions   All other review of systems is negative unless indicated above.    VITAL:  T(C): , Max: 36.9 (25 @ 04:42)  T(F): , Max: 98.5 (25 @ 04:42)  HR: 113 (25 @ 04:42)  BP: 146/97 (25 @ 04:42)  BP(mean): 80 (25 @ 20:05)  RR: 19 (25 @ 04:42)  SpO2: 100% (25 @ 04:42)    PHYSICAL EXAM:  Constitutional: NAD, Alert  HEENT: NCAT, MMM  Neck: Supple, No JVD  Respiratory: CTA-b/l  Cardiovascular: tachy s1s2  Gastrointestinal: BS+, soft, NT/ND  : (+)maldonado  Extremities: No peripheral edema b/l  Neurological: no focal deficits; strength grossly intact  Back: no CVAT b/l  Skin: No rashes, no nevi    LABS:                        8.5    9.49  )-----------( 231      ( 01 May 2025 17:37 )             28.5     Na(140)/K(4.9)/Cl(104)/HCO3(16)/BUN(137)/Cr(5.85)Glu(104)/Ca(9.1)/Mg(2.3)/PO4(6.3)     @ 17:37    Urinalysis Basic - ( 02 May 2025 05:15 )  Color: Orange / Appearance: Cloudy / S.012 / pH: x  Gluc: x / Ketone: Negative mg/dL  / Bili: Negative / Urobili: 0.2 mg/dL   Blood: x / Protein: 100 mg/dL / Nitrite: Negative   Leuk Esterase: Trace / RBC: 129 /HPF / WBC 8 /HPF   Sq Epi: x / Non Sq Epi: 3 /HPF / Bacteria: Negative /HPF  Sodium, Random Urine: 48 mmol/L ( @ 05:15)  Creatinine, Random Urine: 48 mg/dL ( @ 05:15)  Protein/Creatinine Ratio Calculation: 1.3 Ratio ( @ 05:15)      (25) - , Cr 5.98  (25) - , Cr 5.57  (25) - , Cr 5.16  (25) - , Cr 5.45  (25) - BUN 65, Cr 3.89      IMAGING:  < from: US Kidney and Bladder (25 @ 12:37) >  1. Technically limited examination. Increased renal cortical echogenicity   compatible with renal parenchymal disease.  2. There is no obvious hydronephrosis; however, assessment is limited,   particularly on the left.  3. There are multiple bilateral renalcysts, which are increased in size   and number compared to the prior CT scan. Many of these cysts appear   complex and cannot be adequately characterized on this examination. A   cystic renal mass/neoplasm cannot be excluded.  Given the technical limitations of this examination, abdominal MRI or CT   is recommended for further evaluation of the kidneys and for further   characterization of the multiple bilateral complex renal cysts and to   exclude a renal neoplasm.      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  (2)DEEDEE - obstructive - hopefully the numbers rapidly improve, s/p maldonado placement upon admission...and hopefully dialysis can be avoided here  (3)Metabolic acidosis - renally mediated - on NaHCO3 650mg po tid  (4)CV - hypertension - on Hydralazine/Lopressor    RECOMMEND:  (1)Strict I/Os; if urine output remains >125cc/h then would add 1/2NS@75cc/h to minimize risk of hypovolemia-associated complications from post-obstructive diuresis  (2)PO NaHCO3 as ordered  (3)Antihypertensives as ordered  (4)Dose new meds for GFR <15ml/min (present dosing is acceptable)  (5)BMP+Mg+PO4 daily  (6)No HD for now    Thank you for involving [facility] in this patient's care.    With warm regards,    MD Wyatt [facility]  Office: [phone]  Cell: [phone]                 HPI: Mr. Armando is a 64 year-old man with history of multiple medical issues including hypertension, gout, atrial fibrillation, and CKD4-5. He is s/p admission at [facility] -25 with worsening arthritic left knee pain and s/p fall; he was discharged to [facility] for subacute rehabiliation. I was called to see him last week at [facility] given his rise in BUN/creatinine to 106/5.45 on 25. The etiology of his DEEDEE on CKD was not entirely obvious; we placed him on NS 70cc/h x 1L at [facility]; we set him up for a renal ultrasound, which he had through [facility] on 25. The ultrasound demonstrated various cysts, but no obvious hydronephrosis was appreciated on the imaging study. I was asked to follow up on him earlier this week; as his BUN/creatinine were no better than prior (123/5.57 on 25), I counseled him (and his sister Rony by phone) regarding his severe azotemia, and my concern that his kidneys failing/that he would require chronic HD in the near future. Labwork was repeated 25 and his BUN/creatinine were up to 130/5.98; therefore, he was sent to the ER for further evaluation/potential dialysis. Of note, whereas he has had fatigue/generalized weakness, he denied nausea, vomiting, or loss of appetite. He denies notable urinary changes of late. He periodically has followed in the past with nephrologist Dr. Mauricio Cortes; he last saw him 2-3 years ago, he reported.    Mr. Armando had a maldonado placed in the ER, with immediate output of 2700cc.        PAST MEDICAL & SURGICAL HISTORY:  HTN  HLD  DM2  AFib  CKD  Prostate CA  Gout  R inguinal hernia repair 2007  Retinal Detachment -bilateral ,   Cataract - bilateral  Quadriceps Tendon Rupture/repair -  R knee arthroscopy x2 (, ); Left knee arthroscopy x1 ()    Allergies  penicillin (Unknown)  clindamycin (Rash)    SOCIAL HISTORY:  Denies ETOh,Smoking,     FAMILY HISTORY:  No CKD    REVIEW OF SYSTEMS:  CONSTITUTIONAL: (+)generalized weakness, no fever/no chills  EYES/ENT: No visual changes;  No vertigo or throat pain   NECK: No pain or stiffness  RESPIRATORY: No cough, wheezing, hemoptysis; No shortness of breath  CARDIOVASCULAR: No chest pain or palpitations  GASTROINTESTINAL: No abdominal or epigastric pain. No nausea, vomiting, or hematemesis; No diarrhea or constipation. No melena or hematochezia.  GENITOURINARY: No dysuria, frequency or hematuria  MUSCULOSKELETAL: (+)L knee pain, (+)LE weakness  SKIN: No itching, burning, rashes, or lesions   All other review of systems is negative unless indicated above.    VITAL:  T(C): , Max: 36.9 (25 @ 04:42)  T(F): , Max: 98.5 (25 @ 04:42)  HR: 113 (25 @ 04:42)  BP: 146/97 (25 @ 04:42)  BP(mean): 80 (25 @ 20:05)  RR: 19 (25 @ 04:42)  SpO2: 100% (25 @ 04:42)    PHYSICAL EXAM:  Constitutional: NAD, Alert  HEENT: NCAT, MMM  Neck: Supple, No JVD  Respiratory: CTA-b/l  Cardiovascular: tachy reg s1s2  Gastrointestinal: BS+, soft, NT/ND  : (+)maldonado  Extremities: No peripheral edema b/l  Neurological: reduced generalized strength  Back: no CVAT b/l  Skin: No rashes, no nevi    LABS:                        8.5    9.49  )-----------( 231      ( 01 May 2025 17:37 )             28.5     Na(140)/K(4.9)/Cl(104)/HCO3(16)/BUN(137)/Cr(5.85)Glu(104)/Ca(9.1)/Mg(2.3)/PO4(6.3)     @ 17:37    Urinalysis Basic - ( 02 May 2025 05:15 )  Color: Orange / Appearance: Cloudy / S.012 / pH: x  Gluc: x / Ketone: Negative mg/dL  / Bili: Negative / Urobili: 0.2 mg/dL   Blood: x / Protein: 100 mg/dL / Nitrite: Negative   Leuk Esterase: Trace / RBC: 129 /HPF / WBC 8 /HPF   Sq Epi: x / Non Sq Epi: 3 /HPF / Bacteria: Negative /HPF  Sodium, Random Urine: 48 mmol/L ( @ 05:15)  Creatinine, Random Urine: 48 mg/dL ( @ 05:15)  Protein/Creatinine Ratio Calculation: 1.3 Ratio ( @ 05:15)      (25) - , Cr 5.98  (25) - , Cr 5.57  (25) - , Cr 5.16  (25) - , Cr 5.45  (25) - BUN 65, Cr 3.89      IMAGING:  < from: US Kidney and Bladder (25 @ 12:37) >  1. Technically limited examination. Increased renal cortical echogenicity   compatible with renal parenchymal disease.  2. There is no obvious hydronephrosis; however, assessment is limited,   particularly on the left.  3. There are multiple bilateral renalcysts, which are increased in size   and number compared to the prior CT scan. Many of these cysts appear   complex and cannot be adequately characterized on this examination. A   cystic renal mass/neoplasm cannot be excluded.  Given the technical limitations of this examination, abdominal MRI or CT   is recommended for further evaluation of the kidneys and for further   characterization of the multiple bilateral complex renal cysts and to   exclude a renal neoplasm.      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  (2)DEEDEE - obstructive - hopefully the numbers rapidly improve, s/p maldonado placement upon admission...and hopefully dialysis can be avoided here  (3)Metabolic acidosis - renally mediated - on NaHCO3 650mg po tid  (4)CV - hypertension - on Hydralazine/Lopressor    RECOMMEND:  (1)Strict I/Os; if urine output remains >125cc/h then would add 1/2NS@75cc/h to minimize risk of hypovolemia-associated complications from post-obstructive diuresis  (2)PO NaHCO3 as ordered  (3)Antihypertensives as ordered  (4)Dose new meds for GFR <15ml/min (present dosing is acceptable)  (5)BMP+Mg+PO4 daily  (6)No HD for now    counseled patient at bedside, and sister Sudha by phone, advised regarding the likelihood that he in fact will not need dialysis; counseled regarding long-term options for management of obstructive uropathy    Thank you for involving [facility] in this patient's care.    With warm regards,    Hilton May MD   [facility]  Office: [phone]  Cell: [phone]

## 2025-05-02 NOTE — PHYSICAL THERAPY INITIAL EVALUATION ADULT - NSPTDISCHREC_GEN_A_CORE
to be determined following completion of functional eval for transfers and ambulation ; anticipate return to [facility]

## 2025-05-02 NOTE — PROGRESS NOTE ADULT - PROBLEM SELECTOR PLAN 5
·  Problem: Hypertension.   ·  Plan: Plan:  - C/w home hydralazine 25mg TID, toprol  - Monitor BPs. > c/w home hydralazine 25mg TID,   > c/w home toprol > c/w home hydralazine 25mg TID,   > c/w home metoprolol succinate 100mg PO QD - A1c: 4.6 4/3/2025  - Home Januvia 50 mg daily    > c/w ISS  > c/w POCT Blood Glucose pre-meal and at bedtime  > Hold home Januvia

## 2025-05-02 NOTE — PHYSICAL THERAPY INITIAL EVALUATION ADULT - STRENGTHENING, PT EVAL
GOAL: Pt will improve LLE strength to 3/5, for increased limb stability, to facilitate transfers and gait in 4 weeks.

## 2025-05-02 NOTE — CONSULT NOTE ADULT - ASSESSMENT
A/P: 63 YO M w/ PMH of DM2 (on Januvia), HTN, HLD, gout, A-fib on Eliquis, CKD not on dialysis (last documented Cr 2.82 on 4/5/25), severe OA, prostate cancer (s/p radiation), recent admission for fall discharged to [facility] rehab, brought in by EMS from [facility] for abnormal lab results (Cr 5.98). Pt was hospitalized last month for a fall. During the prior hospitalization, also found worsened Cr, but unclear whether it was progressive CKD or DEEDEE on CKD. Pt had been in [facility] rehab since prior discharge. Today, found to have elevated BUN/Cr 130/5.98, Hgb 7.1 (baseline ~10) at [facility]. Pt endorses polyuria and L knee pain, which are his chronic conditions. Also reports pain in buttocks, unchanged from prior.    Wound Consult requested to assist w/ management of  Sacral region BL buttocks and BL ischial DTPI's  BLE xerosis    Recommendations:   Buttocks/ Sacrum  cavilon daily Triad BID and prn soiling  Xerosis  Lac-hydrin    Moisturize intact skin w/ SWEEN cream BID  Nutrition Consult for optimization in pt w/  Increased nutritional needs            encourage high quality protein, umm/ prosource, MVI & Vit C to promote wound healing    Continue turning and positioning w/ offloading assistive devices as per protocol       Continue w/ attends under pads and Pericare w/ maldonado/ care as per protocol  Waffle Cushion to chair when oob to chair  Continue w/ low air loss pressure redistribution bed surface   Pt will need Group 2 mattress on hospital bed and ROHO cushion for wheel chair upon discharge home  Care as per medicine, will follow w/ you  Upon discharge f/u as outpatient at [facility] [phone]  Seen and D/w team & RN  Thank you for this consult,  CHRISTIAN Blunt-BC, [facility]  [phone]  Nights/ Weekends/ Holidays please call:  General Surgery Consult pager [phone]) for emergencies  Wound PT for multilayer leg wrapping or VAC issues ([phone])

## 2025-05-02 NOTE — PHYSICAL THERAPY INITIAL EVALUATION ADULT - ADDITIONAL COMMENTS
Pt admitted from [facility]. Prior to rehab and recent hospitalization s/p pt living alone in a private house with 3 steps to enter; (+)bilateral handrails; bedroom/bathroom is on the first floor. Pt was completely independent and used either bilateral single axis canes or rollator with ambulation. Pt repots at rehab OOB to chair with patient life device and standing with rehab services, not yet ambulating.

## 2025-05-02 NOTE — H&P ADULT - ATTENDING COMMENTS
64M PMH T2DM, HTN, HLD, CKD, AFib (Eliquis), prostate cancer, OA, gout, admit [facility] (under Dr. Woo service) 4/2-4/5 for mechanical fall, DEEDEE on CKD, [facility] (SCr 5.98, Hgb 7.1).     Per HIE review, renal US performed 4/25 with impression as follows:  IMPRESSION:  1. Technically limited examination. Increased renal cortical echogenicity compatible with renal parenchymal disease.  2. There is no obvious hydronephrosis; however, assessment is limited, particularly on the left.  3. There are multiple bilateral renal cysts, which are increased in size and number compared to the prior CT scan. Many of these cysts appear complex and cannot be adequately characterized on this examination. A cystic renal mass/neoplasm cannot be excluded.  Given the technical limitations of this examination, abdominal MRI or CT is recommended for further evaluation of the kidneys and for further characterization of the multiple bilateral complex renal cysts and to exclude a renal neoplasm.    HR 80s-100s, BP 100s-140s/60s-90s    neutrophil predominance, H/H 8.5/28.5 (normocytic); BUN/SCr 137/5.85 (GFR 10), bicarb 16, AG 20, phos 6.3, albumin 3.1; VBG 7.29/37/46/18     s/p ofirmev 1g, lido 4% patch, 1L NS    #abnormal renal fn  SCr increased over time since d/c 3.82 4/5 -> 5.85   c/b metabolic acidosis   - BS, Ustudies  - monitor BMP/UOP, dose per renal fn, avoid toxins  - repeat gas in AM re stability, c/w bicarb in interim  - obtain CT A/P per radiology recommendation from US renal outpatient r/o renal mass/neoplasm (see HPI)   - followed by nephro Dr. Hilton May as outpatient, contact in AM     #normocytic anemia  Hgb 10.7 4/5 -> 8.6 4/14 -> 8.5    - repeat CBC in AM, anemia w/u  - given relative stability x2w will c/w home Eliquis though low treshold to d/c if Hgb downtrend or anemia w/u c/f blood loss     #T2DM  - KWESI, monitor FS    #HTN  - c/w hydralazine, toprol, monitor BP    #AFib  - c/w BB, eliquis as above  - f/u screening EKG (not commented upon by ED team)     #PPx  - DOAC re VTE PPx  - CC/DASH/TLC diet   - fall precaution  - dispo pending course, PT eval   - repeat CBC w/ diff in AM re neutrophil predominance   - nutrition evaluation (albumin low and recent hospital stay/rehab stay)     case and plan d/w PGY-1 resident Dr. Frederick 64M PMH T2DM, HTN, HLD, CKD, AFib (Eliquis), prostate cancer, OA, gout, admit [facility] (under Dr. Woo service) 4/2-4/5 for mechanical fall, DEEDEE on CKD, [facility] (SCr 5.98, Hgb 7.1). AOx4, reports feeling generally, not endorsing f/c, HA, lightheadedness, dizziness, CP, palpitations, cough, SOB, abd pain, n/v/d, constipation, melena, hematochezia, dysuria, hematuria, change in urinary frequency/odor, numbness, tingling, weakness, or other complaint (endorses chronic L knee pain unchanged, and L hip hematoma improved from [facility] admission).     Per HIE review, renal US performed 4/25 with impression as follows:  IMPRESSION:  1. Technically limited examination. Increased renal cortical echogenicity compatible with renal parenchymal disease.  2. There is no obvious hydronephrosis; however, assessment is limited, particularly on the left.  3. There are multiple bilateral renal cysts, which are increased in size and number compared to the prior CT scan. Many of these cysts appear complex and cannot be adequately characterized on this examination. A cystic renal mass/neoplasm cannot be excluded.  Given the technical limitations of this examination, abdominal MRI or CT is recommended for further evaluation of the kidneys and for further characterization of the multiple bilateral complex renal cysts and to exclude a renal neoplasm.    HR 80s-100s, BP 100s-140s/60s-90s    neutrophil predominance, H/H 8.5/28.5 (normocytic); BUN/SCr 137/5.85 (GFR 10), bicarb 16, AG 20, phos 6.3, albumin 3.1; VBG 7.29/37/46/18     s/p ofirmev 1g, lido 4% patch, 1L NS    PHYSICAL EXAM:  GENERAL: NAD, well-developed, pleasant to interview  HEAD: Atraumatic, Normocephalic  EYES: Pupils surgical (per pt), conjunctiva and sclera clear  ENMT: Visualized oral MMM  NECK: Supple w/o JVD  NERVOUS SYSTEM: Alert & Oriented X4, Good concentration; NEVILLE  CHEST/LUNG: Clear to auscultation bilaterally; No rales, rhonchi, wheezing, or rubs  HEART: Regular rate and +irregular rhythm; No murmurs, rubs, or gallops  ABDOMEN: Soft, Nontender, Nondistended; Bowel sounds present. No guarding, rebound tenderness, or rigidity. +midline surgical scar, R abd hernia (pt states is chronic, unchanged)   EXTREMITIES: 2+ Peripheral Pulses, No warmth/erythema/tenderness to palpation b/l LE. 1+ pitting edema b/l LE w/ flaked skin (pt states is chronic, unchanged)     #abnormal renal fn  SCr increased over time since d/c 3.82 4/5 -> 5.85   c/b metabolic acidosis   - BS, Ustudies  - monitor BMP/UOP, dose per renal fn, avoid toxins  - repeat gas in AM re stability, c/w bicarb in interim  - obtain CT A/P per radiology recommendation from US renal outpatient r/o renal mass/neoplasm (see HPI)   - followed by nephro Dr. Hilton May as outpatient, contact in AM     #normocytic anemia  Hgb 10.7 4/5 -> 8.6 4/14 -> 8.5    - repeat CBC in AM, anemia w/u  - given relative stability x2 weeks will c/w home Eliquis though low treshold to d/c if Hgb downtrend or anemia w/u c/f blood loss     #T2DM  - KWESI, monitor FS    #HTN  - c/w hydralazine, toprol, monitor BP    #AFib  - c/w BB, eliquis as above  - f/u screening EKG (not commented upon by ED team)     #PPx  - DOAC re VTE PPx  - CC/DASH/TLC diet   - fall precaution  - dispo pending course, PT eval   - repeat CBC w/ diff in AM re neutrophil predominance   - nutrition evaluation (albumin low and recent hospital stay/rehab stay)     case and plan d/w PGY-1 resident Dr. Frederick 64M PMH T2DM, HTN, HLD, CKD, AFib (Eliquis), prostate cancer, OA, gout, admit [facility] (under Dr. Woo service) 4/2-4/5 for mechanical fall, DEEDEE on CKD, [facility] (SCr 5.98, Hgb 7.1). AOx4, reports feeling generally, not endorsing f/c, HA, lightheadedness, dizziness, CP, palpitations, cough, SOB, abd pain, n/v/d, constipation, melena, hematochezia, dysuria, hematuria, change in urinary frequency/odor, numbness, tingling, weakness, or other complaint (endorses chronic L knee pain unchanged, and L hip hematoma improved from [facility] admission).     Per HIE review, renal US performed 4/25 with impression as follows:  IMPRESSION:  1. Technically limited examination. Increased renal cortical echogenicity compatible with renal parenchymal disease.  2. There is no obvious hydronephrosis; however, assessment is limited, particularly on the left.  3. There are multiple bilateral renal cysts, which are increased in size and number compared to the prior CT scan. Many of these cysts appear complex and cannot be adequately characterized on this examination. A cystic renal mass/neoplasm cannot be excluded.  Given the technical limitations of this examination, abdominal MRI or CT is recommended for further evaluation of the kidneys and for further characterization of the multiple bilateral complex renal cysts and to exclude a renal neoplasm.    HR 80s-100s, BP 100s-140s/60s-90s    neutrophil predominance, H/H 8.5/28.5 (normocytic); BUN/SCr 137/5.85 (GFR 10), bicarb 16, AG 20, phos 6.3, albumin 3.1; VBG 7.29/37/46/18     s/p ofirmev 1g, lido 4% patch, 1L NS    PHYSICAL EXAM:  GENERAL: NAD, well-developed, pleasant to interview  HEAD: Atraumatic, Normocephalic  EYES: Pupils surgical (per pt), conjunctiva and sclera clear  ENMT: Visualized oral MMM  NECK: Supple w/o JVD  NERVOUS SYSTEM: Alert & Oriented X4, Good concentration; NEVILLE  CHEST/LUNG: Clear to auscultation bilaterally; No rales, rhonchi, wheezing, or rubs  HEART: Regular rate and +irregular rhythm; No murmurs, rubs, or gallops  ABDOMEN: Soft, Nontender, Nondistended; Bowel sounds present. No guarding, rebound tenderness, or rigidity. +midline surgical scar, R abd hernia (pt states is chronic, unchanged)   EXTREMITIES: 2+ Peripheral Pulses, No warmth/erythema/tenderness to palpation b/l LE. 1+ pitting edema b/l LE w/ flaked skin (pt states is chronic, unchanged). Patient defers turning for examination of L hip hematoma (states that it is improving from [facility] admit)     #abnormal renal fn  SCr increased over time since d/c 3.82 4/5 -> 5.85   c/b metabolic acidosis   - BS, Ustudies  - monitor BMP/UOP, dose per renal fn, avoid toxins  - repeat gas in AM re stability, c/w bicarb in interim  - obtain CT A/P per radiology recommendation from US renal outpatient r/o renal mass/neoplasm (see HPI)   - followed by nephro Dr. Hilton May as outpatient, contact in AM     #normocytic anemia  Hgb 10.7 4/5 -> 8.6 4/14 -> 8.5    - repeat CBC in AM, anemia w/u  - given relative stability x2 weeks will c/w home Eliquis though low treshold to d/c if Hgb downtrend or anemia w/u c/f blood loss   - monitor L hip hematoma site (pt defers exam ON)     #T2DM  - KWESI, monitor FS    #HTN  - c/w hydralazine, toprol, monitor BP    #AFib  - c/w BB, eliquis as above  - f/u screening EKG (not commented upon by ED team)     #PPx  - DOAC re VTE PPx  - CC/DASH/TLC diet   - fall precaution  - dispo pending course, PT eval   - repeat CBC w/ diff in AM re neutrophil predominance   - nutrition evaluation (albumin low and recent hospital stay/rehab stay)     case and plan d/w PGY-1 resident Dr. Frederick

## 2025-05-02 NOTE — H&P ADULT - PROBLEM SELECTOR PLAN 6
Plan:  - C/w home hydralazine Plan:  - C/w home hydralazine 25mg TID  - Monitor BPs - Home metoprolol succinate ER 100mg daily    Plan:  - C/w home metoprolol succinate, eliquis as above  - f/u screening EKG (not commented upon by ED)

## 2025-05-02 NOTE — H&P ADULT - PROBLEM SELECTOR PLAN 3
- Recent hospitalization after mechanical fall.  - chronic L knee pain    Plan:  - Fall/apsiration precaution  - PT eval  - Nutritian consult  - Lidocaine patch as needed  - Wound care consult - Recent hospitalization after mechanical fall.  - chronic L knee pain    Plan:  - Fall/aspiration precaution  - PT eval  - Nutrition consult  - Lidocaine patch as needed  - Wound care consult - Recent hospitalization after mechanical fall.  - Chronic L knee pain.   - Home pain as-needed pain meds include: Acetaminophen; Tramadol 50mg Q8H for pain (4-6); methocarbamol 750mg BID; lidocaine patch.    Plan:  - Fall/aspiration precaution  - PT eval  - Nutrition consult  - Lidocaine patch as needed  - PO pain regiment as needed  - Wound care consult - Recent hospitalization after mechanical fall.  - Chronic L knee pain.   - Home pain as-needed pain meds include: Acetaminophen; Tramadol 50mg Q8H for pain (4-6); methocarbamol 750mg BID; lidocaine patch.    Plan:  - Fall precaution  - PT eval  - Nutrition consult  - Lidocaine patch as needed  - PO pain regiment as needed  - Wound care consult

## 2025-05-02 NOTE — H&P ADULT - PROBLEM SELECTOR PLAN 2
- Hbg 8.5 at admission. Recent baseline ~10-11  - Pt denies melena or hematochezia  Possibly progressive ACD d/t CKD.    Plan:  - Clean liquid diet for now  - Hold home eliquis  - Monitor CBC  - If hbg stable, may restart eliquis  - Hold home iron pill for now. (May consider to change home daily iron pill to 3 times a week). - Hbg 8.5 at admission. Recent baseline ~10-11  - Pt denies melena or hematochezia  - Home ferrous sulfate 325 mg (65mg iron) daily  - Possibly progressive ACD d/t CKD.    Plan:  - Clean liquid diet for now  - Hold home Eliquis (5mg BID) for now. If hbg stable, may restart Eliquis  - Monitor CBC  - Hold home iron pill for now. (May consider to change home daily iron pill to 3 times a week). ·  Plan: - Hbg 8.5 at admission. Recent baseline ~10-11  - Pt denies melena or hematochezia  - Home ferrous sulfate 325 mg (65mg iron) daily  - Possibly progressive ACD d/t CKD.    Plan:  Hgb 10.7 4/5 -> 8.6 4/14 -> 8.5    - repeat CBC in AM, anemia w/u  - given relative stability x2w will c/w home Eliquis though low treshold to d/c if Hgb downtrend or anemia w/u c/f blood loss   - Hold home iron pill for now. (May consider to change home daily iron pill to 3 times a week). - Hbg 8.5 at admission. Recent baseline ~10-11  - Pt denies melena or hematochezia  - Home ferrous sulfate 325 mg (65mg iron) daily  - Possibly progressive ACD d/t CKD.    Plan:  Hgb 10.7 4/5 -> 8.6 4/14 -> 8.5    - repeat CBC in AM, anemia w/u  - given relative stability x2w will c/w home Eliquis though low treshold to d/c if Hgb downtrend or anemia w/u c/f blood loss   - C/w home iron pill for now. (May consider to change home daily iron pill to 3 times a week).

## 2025-05-02 NOTE — H&P ADULT - NSHPADDITIONALINFOADULT_GEN_ALL_CORE
Urgent medical problems addressed overnight, comprehensive care to be assumed by day colleagues during course of admission.    Please refer to detailed radiology reports above, provide to patient upon discharge for presentation to PCP at which time abnormal findings not addressed inpatient can be followed up.    Case assigned to me overnight by hospitalist in charge Dr. Cruz

## 2025-05-02 NOTE — PROGRESS NOTE ADULT - PROBLEM SELECTOR PLAN 2
- Hbg 8.5 at admission. Recent baseline ~10-11  - Pt denies melena or hematochezia  - Home ferrous sulfate 325 mg (65mg iron) daily  - Possibly progressive ACD d/t CKD.    Plan:  Hgb 10.7 4/5 -> 8.6 4/14 -> 8.5    - repeat CBC in AM, anemia w/u  - given relative stability x2w will c/w home Eliquis though low treshold to d/c if Hgb downtrend or anemia w/u c/f blood loss   - C/w home iron pill for now. (May consider to change home daily iron pill to 3 times a week). - Hbg 8.5 at admission. Recent baseline ~10-11  - Pt denies melena or hematochezia  - Home ferrous sulfate 325 mg (65mg iron) daily  - Possibly progressive ACD d/t CKD.  - Hgb 10.7 4/5 -> 8.6 4/14 -> 8.5      > f/u repeat CBC in AM, anemia w/u  > given relative stability x2w will c/w home Eliquis though low treshold to d/c if Hgb downtrend or anemia w/u c/f blood loss   >c/w home iron pill for now. (May consider to change home daily iron pill to 3 times a week). - Hbg 8.5 at admission. Recent baseline ~10-11  - Pt denies melena or hematochezia  - Home ferrous sulfate 325 mg (65mg iron) daily  - Possibly progressive ACD d/t CKD.  - Hgb trend: 10.7 (4/5) -> 8.6 (4/14) -> 8.5 (05/01)    > c/w trending HgB  > c/w home ferrous sulfate 325mg PO QD  > f/u Ferritin, Folate, Reticulocyte Count, TIBC, B12 - 4/25/25 Renal US: multiple bilateral renal cysts, which are increased in size and number compared to the prior CT scan - c/f neoplasm  - 05/02/25 CTAP w/o contrast: Limited exam without IV contrast unable to diagnostically evaluate the bilateral renal lesions of varying size and complexity. Recommend renal protocol MRI with IV contrast if clinically desired    > Plan for renal protocol MRI with IV contrast when kidney fx improves

## 2025-05-02 NOTE — H&P ADULT - PROBLEM SELECTOR PLAN 5
- A1c: 4.6 4/3/2025    Plan:  - Holding home Januvia  - continue ISS - A1c: 4.6 4/3/2025    Plan:  - Holding home Januvia  - ISS - A1c: 4.6 4/3/2025  - Home Januvia 50 mg daily    Plan:  - Holding home Januvia  - ISS ·  Problem: Hypertension.   ·  Plan: Plan:  - C/w home hydralazine 25mg TID, toprol  - Monitor BPs.

## 2025-05-02 NOTE — PROGRESS NOTE ADULT - PROBLEM SELECTOR PLAN 1
- Per nephro note in prior hospitalization: unclear if DEEDEE on CKD vs slowly progressive CKD.   - 5/1/25 Cr 5.85 at admission. (In prior hospitalization, Cr 3.9 on admission. Per Dr. Woo, baseline Cr is 2.5)  - ED team spoked with Dr. Hilton May (Nephrologist at [facility]). Lalo plans to see pt tomorrow 5/2/25 to discuss about HD.  - Was previously followed by nephrologist (Dr Mauricio Cortes) but hasn't seen for last couple of years.  - 4/25/25 Renal US: multiple bilateral renal cysts, which are increased in size and number compared to the prior CT scan - c/f neoplasm    > c/w bladder scans q8H  > f/u urine studies  > monitor BMP q8H  > c/w strict intake/output Q6H  > c/w bicarb tabs for now  > f/u CT A/P per radiology recommendation from US renal outpatient r/o renal mass/neoplasm   > f/u Nephrology recommendations - Per nephro note in prior hospitalization: unclear if DEEDEE on CKD vs slowly progressive CKD.   - 5/1/25 Cr 5.85 at admission. (In prior hospitalization, Cr 3.9 on admission. Per Dr. Woo, baseline Cr is 2.5)  - ED team spoked with Dr. Hilton May (Nephrologist at [facility]). Lalo plans to see pt tomorrow 5/2/25 to discuss about HD.  - Was previously followed by nephrologist (Dr Mauricio Cortes) but hasn't seen for last couple of years.  - 4/25/25 Renal US: multiple bilateral renal cysts, which are increased in size and number compared to the prior CT scan - c/f neoplasm  - Urine Lytes (05/02): FeNa 4.2% suggestive of intrinsic disease  - s/p maldonado catheter placement with 2.7L fluid removed    > c/w bladder scans q8H  > monitor BMP q8H  > c/w bicarb 650mg PO TID   > f/u CT A/P per radiology recommendation from US renal outpatient r/o renal mass/neoplasm   > c/w strict intake/output Q8H  > f/u Nephrology recommendations - Per nephro note in prior hospitalization: unclear if DEEDEE on CKD vs slowly progressive CKD.   - 5/1/25 Cr 5.85 at admission. (In prior hospitalization, Cr 3.9 on admission. Per Dr. Woo, baseline Cr is 2.5)  - ED team spoked with Dr. Hilton aMy (Nephrologist at [facility]). Lalo plans to see pt tomorrow 5/2/25 to discuss about HD.  - Was previously followed by nephrologist (Dr Mauricio Cortes) but hasn't seen for last couple of years.  - 4/25/25 Renal US: multiple bilateral renal cysts, which are increased in size and number compared to the prior CT scan - c/f neoplasm  - Urine Lytes (05/02): FeNa 4.2% suggestive of intrinsic disease  - s/p maldonado catheter placement with 2.7L fluid removed    > c/w indwelling maldonado (05/01-  > c/w bladder scans q8H  > monitor BMP q8H  > c/w bicarb 650mg PO TID   > f/u CT A/P per radiology recommendation from US renal outpatient r/o renal mass/neoplasm   > c/w strict intake/output Q8H  > f/u Nephrology recommendations - Per nephro note in prior hospitalization: unclear if DEEDEE on CKD vs slowly progressive CKD.   - 5/1/25 Cr 5.85 at admission. (In prior hospitalization, Cr 3.9 on admission. Per Dr. Woo, baseline Cr is 2.5)  - ED team spoked with Dr. Hilton May (Nephrologist at [facility]). Lalo plans to see pt tomorrow 5/2/25 to discuss about HD.  - Was previously followed by nephrologist (Dr Mauricio Cortes) but hasn't seen for last couple of years.  - 4/25/25 Renal US: multiple bilateral renal cysts, which are increased in size and number compared to the prior CT scan - c/f neoplasm  - Urine Lytes (05/02): FeNa 4.2% suggestive of intrinsic disease  - s/p maldonado catheter placement with 2.7L fluid removed  - Ddx: Likely DEEDEE on CKD 2/2 post-renal DEEDEE s/p 2.7L fluid removed on maldonado catheter    > c/w indwelling maldonado (05/01-  > monitor BMP q8H  > c/w bicarb 650mg PO TID   > c/w bladder scans q8H  > c/w strict intake/output Q8H  > f/u Nephrology recommendations - Per nephro note in prior hospitalization: unclear if DEEDEE on CKD vs slowly progressive CKD.   - 5/1/25 Cr 5.85 at admission. (In prior hospitalization, Cr 3.9 on admission. Per Dr. Woo, baseline Cr is 2.5)  - ED team spoked with Dr. Hilton May (Nephrologist at [facility]). Lalo plans to see pt tomorrow 5/2/25 to discuss about HD.  - Was previously followed by nephrologist (Dr Mauricio Cortes) but hasn't seen for last couple of years.  - 4/25/25 Renal US: multiple bilateral renal cysts, which are increased in size and number compared to the prior CT scan - c/f neoplasm  - Urine Lytes (05/02): FeNa 4.2% suggestive of intrinsic disease  - s/p maldonado catheter placement with 2.7L fluid removed  - Ddx: Likely DEEDEE on CKD 2/2 post-renal DEEDEE s/p 2.7L fluid removed on maldonado catheter    > c/w indwelling maldonado (05/01-  > monitor BMP q8H  > c/w bicarb 650mg PO TID   > start sevelamer BID; can d/c when phosphate normalizes  > c/w bladder scans q8H  > c/w strict intake/output Q8H  > f/u Nephrology recommendations

## 2025-05-02 NOTE — PROGRESS NOTE ADULT - ASSESSMENT
63 YO M w/ PMH of DM2 (on Januvia), HTN, HLD, gout, A-fib (on Eliquis), CKD not on dialysis (last documented Cr 2.82 on 4/5/25), severe OA, prostate cancer (s/p radiation), recent admission for fall discharged to [facility], presented with worsened creatinine, and decrease of hbg. C/f progressive CKD, vs DEEDEE on CKD. 65 YO M w/ PMH of DM2 (on Januvia), HTN, HLD, gout, A-fib (on Eliquis), CKD not on dialysis (last documented Cr 2.82 on 4/5/25), severe OA, prostate cancer (s/p radiation), recent admission for fall discharged to [facility], admitted for elevated sCr s/p 2.7L removal on straight catheterization - likely post-renal DEEDEE.

## 2025-05-02 NOTE — H&P ADULT - NSHPSOCIALHISTORY_GEN_ALL_CORE
- Lives alone prior to previous hospitalization for fall.   - Currently in [facility]  - Denies ever smoking or recreational drug  - Rarely drinks alcohols

## 2025-05-02 NOTE — PROGRESS NOTE ADULT - PROBLEM SELECTOR PLAN 7
- S/p radiation  - Home Myrbetriq 50mg daily    Plan:  - Either oxybutynin 5mg BID, or pt can bring own home Myrbetriq   - Outpatient f/u - S/p radiation  - Home Myrbetriq 50mg daily    > Either oxybutynin 5mg BID, or pt can bring own home Myrbetriq   > Outpatient f/u - Prostate CA s/p radiation  - Home Myrbetriq 50mg daily    > Either oxybutynin 5mg BID, or pt can bring own home Myrbetriq   > f/u Outpatient - Home metoprolol succinate ER 100mg daily    > c/w home metoprolol succinate 100mg PO QD   > c/w eliquis 5mg as above  > f/u EKG

## 2025-05-02 NOTE — H&P ADULT - NSHPLABSRESULTS_GEN_ALL_CORE
8.5    9.49  )-----------( 231      ( 01 May 2025 17:37 )             28.5     05-01    140  |  104  |  137[H]  ----------------------------<  104[H]  4.9   |  16[L]  |  5.85[H]    Ca    9.1      01 May 2025 17:37  Phos  6.3     05-01  Mg     2.3     05-01    TPro  7.0  /  Alb  3.1[L]  /  TBili  0.6  /  DBili  x   /  AST  13  /  ALT  14  /  AlkPhos  103  05-01      Lactate Trend      Urinalysis Basic - ( 01 May 2025 17:37 )    Color: x / Appearance: x / SG: x / pH: x  Gluc: 104 mg/dL / Ketone: x  / Bili: x / Urobili: x   Blood: x / Protein: x / Nitrite: x   Leuk Esterase: x / RBC: x / WBC x   Sq Epi: x / Non Sq Epi: x / Bacteria: x          CAPILLARY BLOOD GLUCOSE      POCT Blood Glucose.: 97 mg/dL (02 May 2025 00:36)

## 2025-05-02 NOTE — PROGRESS NOTE ADULT - PROBLEM SELECTOR PLAN 8
- DOAC re VTE PPx  - CC/DASH/TLC diet   - fall precaution  - dispo pending course, PT eval   - repeat CBC w/ diff in AM re neutrophil predominance   - nutrition evaluation (albumin low and recent hospital stay/rehab stay) -DVT Ppx: c/w Eliquis  -Diet: CC/DASH/TLC diet, pending nutrition recs    -Fall Precautions  -Dispo: Pending resolution of DEEDEE  -PT eval: pending - Prostate CA s/p radiation  - Home Myrbetriq 50mg daily    > Either oxybutynin 5mg BID, or pt can bring own home Myrbetriq   > f/u Outpatient

## 2025-05-02 NOTE — H&P ADULT - PROBLEM SELECTOR PLAN 8
- DDV ppx: SCD  - Diet: clear diet for now. Will advance if there's no sign of bleeding - DOAC re VTE PPx  - CC/DASH/TLC diet   - fall precaution  - dispo pending course, PT eval   - repeat CBC w/ diff in AM re neutrophil predominance   - nutrition evaluation (albumin low and recent hospital stay/rehab stay)

## 2025-05-02 NOTE — PROGRESS NOTE ADULT - PROBLEM SELECTOR PLAN 6
- Home metoprolol succinate ER 100mg daily    Plan:  - C/w home metoprolol succinate, eliquis as above  - f/u screening EKG (not commented upon by ED) - Home metoprolol succinate ER 100mg daily    > c/w home metoprolol succinate, eliquis as above  > f/u screening EKG (not commented upon by ED) - Home metoprolol succinate ER 100mg daily    > c/w home metoprolol succinate 100mg PO QD   > c/w eliquis 5mg as above  > f/u EKG > c/w home hydralazine 25mg TID,   > c/w home metoprolol succinate 100mg PO QD

## 2025-05-02 NOTE — H&P ADULT - PROBLEM SELECTOR PLAN 4
Plan:  - C/w home metoprolol succinate   - Hold home Eliquis ISO dropped hbg. Will restart if hbg stable. Plan:  - C/w home metoprolol succinate   - Hold home Eliquis ISO dropped hgb. Will restart if hgb stable. - Home metoprolol succinate ER 100mg daily    Plan:  - C/w home metoprolol succinate   - Hold home Eliquis ISO dropped hgb. Will restart if hgb stable. ·  Plan: - A1c: 4.6 4/3/2025  - Home Januvia 50 mg daily    Plan:  - Holding home Januvia  - ISS. - A1c: 4.6 4/3/2025  - Home Januvia 50 mg daily    Plan:  - Holding home Januvia  - ISS.

## 2025-05-02 NOTE — H&P ADULT - NSHPPHYSICALEXAM_GEN_ALL_CORE
GENERAL: no acute distress, non-toxic appearing  HEAD: normocephalic, atraumatic  HEENT: oral mucosa moist, full ROM of neck  CARDIAC: regular rate rhythm, normal S1/S2  CHEST: CTA BL, no wheeze or crackles  ABDOMEN: normal BS, soft, no tenderness  EXTREMITY: no gross deformity, no edema, good perfusion. Skin slightly discolored in bilateral LE.   NEURO: AOx3

## 2025-05-02 NOTE — H&P ADULT - PROBLEM SELECTOR PLAN 1
- Per nephro note in prior hospitalization: unclear if DEEDEE on CKD vs slowly progressive CKD.   - 5/1/25 Cr 5.85 at admission. (In prior hospitalization, Cr 3.9 on admission. Per Dr. Woo, baseline Cr is 2.5)  - ED team spoked with Dr. Hilton May (Nephrologist at [facility]). Llao plans to see pt tomorrow 5/2/25 to discuss about HD.  - Was previously followed by nephrologist (Dr Mauricio Cortes) but hasn't seen for last couple of years.  - 5/1/25 Renal US: multiple bilateral renal cysts, which are increased in size and number compared to the prior CT scan    Plan:  - Nephro Dr. Hilton May to see pt 5/2/25 to discuss HD. F/u recs  - No urgent intervention for now  - C/w NaHCO3 TID  - Renally dosed meds - Per nephro note in prior hospitalization: unclear if DEEDEE on CKD vs slowly progressive CKD.   - 5/1/25 Cr 5.85 at admission. (In prior hospitalization, Cr 3.9 on admission. Per Dr. Woo, baseline Cr is 2.5)  - ED team spoked with Dr. Hilton May (Nephrologist at [facility]). Lalo plans to see pt tomorrow 5/2/25 to discuss about HD.  - Was previously followed by nephrologist (Dr Mauricio Cortes) but hasn't seen for last couple of years.  - 5/1/25 Renal US: multiple bilateral renal cysts, which are increased in size and number compared to the prior CT scan    Plan:  - Nephro Dr. Hilton May to see pt 5/2/25 to discuss HD. F/u recs  - No urgent intervention for now  - C/w NaHCO3 650mg TID  - Renally dosed meds ·  Plan: - Per nephro note in prior hospitalization: unclear if DEEDEE on CKD vs slowly progressive CKD.   - 5/1/25 Cr 5.85 at admission. (In prior hospitalization, Cr 3.9 on admission. Per Dr. Woo, baseline Cr is 2.5)  - ED team spoked with Dr. Hilton May (Nephrologist at [facility]). Lalo plans to see pt tomorrow 5/2/25 to discuss about HD.  - Was previously followed by nephrologist (Dr Mauricio Cortes) but hasn't seen for last couple of years.  - 5/1/25 Renal US: multiple bilateral renal cysts, which are increased in size and number compared to the prior CT scan - c/f neoplasm    Plan:  SCr increased over time since d/c 3.82 4/5 -> 5.85   c/b metabolic acidosis   - BS, Ustudies  - monitor BMP/UOP, dose per renal fn, avoid toxins  - repeat gas in AM re stability, c/w bicarb in interim  - obtain CT A/P per radiology recommendation from US renal outpatient r/o renal mass/neoplasm (see HPI)   - followed by nephro Dr. Hilton May as outpatient, contact in AM - Per nephro note in prior hospitalization: unclear if DEEDEE on CKD vs slowly progressive CKD.   - 5/1/25 Cr 5.85 at admission. (In prior hospitalization, Cr 3.9 on admission. Per Dr. Woo, baseline Cr is 2.5)  - ED team spoked with Dr. Hilton May (Nephrologist at [facility]). Lalo plans to see pt tomorrow 5/2/25 to discuss about HD.  - Was previously followed by nephrologist (Dr Mauricio Cortes) but hasn't seen for last couple of years.  - 4/25//25 Renal US: multiple bilateral renal cysts, which are increased in size and number compared to the prior CT scan - c/f neoplasm    Plan:  SCr increased over time since d/c 3.82 4/5 -> 5.85   c/b metabolic acidosis   - BS, Ustudies  - monitor BMP/UOP, dose per renal fn, avoid toxins  - repeat gas in AM re stability, c/w bicarb in interim  - obtain CT A/P per radiology recommendation from US renal outpatient r/o renal mass/neoplasm (see HPI)   - followed by nephro Dr. Hilton May as outpatient, contact in AM

## 2025-05-02 NOTE — PROGRESS NOTE ADULT - PROBLEM SELECTOR PLAN 3
- Recent hospitalization after mechanical fall.  - Chronic L knee pain.   - Home pain as-needed pain meds include: Acetaminophen; Tramadol 50mg Q8H for pain (4-6); methocarbamol 750mg BID; lidocaine patch.    Plan:  - Fall precaution  - PT eval  - Nutrition consult  - Lidocaine patch as needed  - PO pain regiment as needed  - Wound care consult - Recent hospitalization after mechanical fall.  - Chronic L knee pain.   - Home pain as-needed pain meds include: Acetaminophen; Tramadol 50mg Q8H for pain (4-6); methocarbamol 750mg BID; lidocaine patch.    > Fall precaution  > PT eval  > Nutrition consult  > Lidocaine patch as needed  > PO pain regiment as needed  > Wound care consult - Recent hospitalization after mechanical fall.  - Chronic L knee pain due to severe OA  - Home pain as-needed pain meds include: Acetaminophen; Tramadol 50mg Q8H for pain (4-6); methocarbamol 750mg BID; lidocaine patch.    > c/w PRN Lidocaine patch  > PO pain regimen as needed: Tylenol 650mg Q6H for mild/moderate pain PRN, Tramadol 50mg Q8H for severe pain PRN; methocarbamol 750mg BID for muscle spasm PRN  > c/w Fall precaution  > f/u Wound care consult  > f/u PT eval  > f/u Nutrition consult - Hgb trend: 10.7 (4/5) -> 8.6 (4/14) -> 8.5 (05/01)  - Pt denies melena or hematochezia  - Home ferrous sulfate 325 mg (65mg iron) daily  - Possibly progressive ACD d/t CKD.    > c/w trending HgB  > Maintain active T&S  > c/w home ferrous sulfate 325mg PO QD  > f/u Ferritin, Folate, Reticulocyte Count, TIBC, B12

## 2025-05-03 LAB
ANION GAP SERPL CALC-SCNC: 18 MMOL/L — HIGH (ref 5–17)
BASOPHILS # BLD AUTO: 0.02 K/UL — SIGNIFICANT CHANGE UP (ref 0–0.2)
BASOPHILS NFR BLD AUTO: 0.2 % — SIGNIFICANT CHANGE UP (ref 0–2)
BUN SERPL-MCNC: 135 MG/DL — HIGH (ref 7–23)
CALCIUM SERPL-MCNC: 8.6 MG/DL — SIGNIFICANT CHANGE UP (ref 8.4–10.5)
CHLORIDE SERPL-SCNC: 106 MMOL/L — SIGNIFICANT CHANGE UP (ref 96–108)
CO2 SERPL-SCNC: 16 MMOL/L — LOW (ref 22–31)
CREAT SERPL-MCNC: 5.49 MG/DL — HIGH (ref 0.5–1.3)
EGFR: 11 ML/MIN/1.73M2 — LOW
EGFR: 11 ML/MIN/1.73M2 — LOW
EOSINOPHIL # BLD AUTO: 0.11 K/UL — SIGNIFICANT CHANGE UP (ref 0–0.5)
EOSINOPHIL NFR BLD AUTO: 1.2 % — SIGNIFICANT CHANGE UP (ref 0–6)
GAS PNL BLDV: SIGNIFICANT CHANGE UP
GLUCOSE SERPL-MCNC: 89 MG/DL — SIGNIFICANT CHANGE UP (ref 70–99)
HCT VFR BLD CALC: 23.8 % — LOW (ref 39–50)
HGB BLD-MCNC: 7.2 G/DL — LOW (ref 13–17)
IMM GRANULOCYTES NFR BLD AUTO: 0.7 % — SIGNIFICANT CHANGE UP (ref 0–0.9)
LYMPHOCYTES # BLD AUTO: 0.33 K/UL — LOW (ref 1–3.3)
LYMPHOCYTES # BLD AUTO: 3.7 % — LOW (ref 13–44)
MAGNESIUM SERPL-MCNC: 2.2 MG/DL — SIGNIFICANT CHANGE UP (ref 1.6–2.6)
MCHC RBC-ENTMCNC: 28.9 PG — SIGNIFICANT CHANGE UP (ref 27–34)
MCHC RBC-ENTMCNC: 30.3 G/DL — LOW (ref 32–36)
MCV RBC AUTO: 95.6 FL — SIGNIFICANT CHANGE UP (ref 80–100)
MONOCYTES # BLD AUTO: 1.05 K/UL — HIGH (ref 0–0.9)
MONOCYTES NFR BLD AUTO: 11.7 % — SIGNIFICANT CHANGE UP (ref 2–14)
NEUTROPHILS # BLD AUTO: 7.41 K/UL — HIGH (ref 1.8–7.4)
NEUTROPHILS NFR BLD AUTO: 82.5 % — HIGH (ref 43–77)
NRBC BLD AUTO-RTO: 0 /100 WBCS — SIGNIFICANT CHANGE UP (ref 0–0)
PHOSPHATE SERPL-MCNC: 5.4 MG/DL — HIGH (ref 2.5–4.5)
PLATELET # BLD AUTO: 190 K/UL — SIGNIFICANT CHANGE UP (ref 150–400)
POTASSIUM SERPL-MCNC: 4.6 MMOL/L — SIGNIFICANT CHANGE UP (ref 3.5–5.3)
POTASSIUM SERPL-SCNC: 4.6 MMOL/L — SIGNIFICANT CHANGE UP (ref 3.5–5.3)
RBC # BLD: 2.49 M/UL — LOW (ref 4.2–5.8)
RBC # FLD: 13.9 % — SIGNIFICANT CHANGE UP (ref 10.3–14.5)
SODIUM SERPL-SCNC: 140 MMOL/L — SIGNIFICANT CHANGE UP (ref 135–145)
WBC # BLD: 8.98 K/UL — SIGNIFICANT CHANGE UP (ref 3.8–10.5)
WBC # FLD AUTO: 8.98 K/UL — SIGNIFICANT CHANGE UP (ref 3.8–10.5)

## 2025-05-03 PROCEDURE — 99233 SBSQ HOSP IP/OBS HIGH 50: CPT | Mod: GC

## 2025-05-03 RX ORDER — LACTULOSE 10 G/15ML
10 SOLUTION ORAL ONCE
Refills: 0 | Status: DISCONTINUED | OUTPATIENT
Start: 2025-05-03 | End: 2025-05-14

## 2025-05-03 RX ORDER — SODIUM CHLORIDE 9 G/1000ML
1000 INJECTION, SOLUTION INTRAVENOUS
Refills: 0 | Status: DISCONTINUED | OUTPATIENT
Start: 2025-05-03 | End: 2025-05-05

## 2025-05-03 RX ORDER — TRAMADOL HYDROCHLORIDE 50 MG/1
50 TABLET, FILM COATED ORAL EVERY 12 HOURS
Refills: 0 | Status: DISCONTINUED | OUTPATIENT
Start: 2025-05-03 | End: 2025-05-07

## 2025-05-03 RX ORDER — SEVELAMER HYDROCHLORIDE 800 MG/1
800 TABLET ORAL
Refills: 0 | Status: DISCONTINUED | OUTPATIENT
Start: 2025-05-03 | End: 2025-05-06

## 2025-05-03 RX ORDER — SODIUM CHLORIDE 9 G/1000ML
1000 INJECTION, SOLUTION INTRAVENOUS
Refills: 0 | Status: DISCONTINUED | OUTPATIENT
Start: 2025-05-03 | End: 2025-05-03

## 2025-05-03 RX ORDER — B1/B2/B3/B5/B6/B12/VIT C/FOLIC 500-0.5 MG
1 TABLET ORAL DAILY
Refills: 0 | Status: DISCONTINUED | OUTPATIENT
Start: 2025-05-03 | End: 2025-05-14

## 2025-05-03 RX ORDER — HYDROMORPHONE/SOD CHLOR,ISO/PF 2 MG/10 ML
2 SYRINGE (ML) INJECTION EVERY 6 HOURS
Refills: 0 | Status: DISCONTINUED | OUTPATIENT
Start: 2025-05-03 | End: 2025-05-10

## 2025-05-03 RX ORDER — POLYETHYLENE GLYCOL 3350 17 G/17G
17 POWDER, FOR SOLUTION ORAL
Refills: 0 | Status: DISCONTINUED | OUTPATIENT
Start: 2025-05-03 | End: 2025-05-14

## 2025-05-03 RX ORDER — BISACODYL 5 MG
10 TABLET, DELAYED RELEASE (ENTERIC COATED) ORAL ONCE
Refills: 0 | Status: COMPLETED | OUTPATIENT
Start: 2025-05-03 | End: 2025-05-03

## 2025-05-03 RX ORDER — ACETAMINOPHEN 500 MG/5ML
650 LIQUID (ML) ORAL EVERY 6 HOURS
Refills: 0 | Status: DISCONTINUED | OUTPATIENT
Start: 2025-05-03 | End: 2025-05-14

## 2025-05-03 RX ADMIN — SODIUM CHLORIDE 75 MILLILITER(S): 9 INJECTION, SOLUTION INTRAVENOUS at 11:19

## 2025-05-03 RX ADMIN — APIXABAN 5 MILLIGRAM(S): 2.5 TABLET, FILM COATED ORAL at 06:02

## 2025-05-03 RX ADMIN — Medication 25 MILLIGRAM(S): at 06:02

## 2025-05-03 RX ADMIN — Medication 325 MILLIGRAM(S): at 11:20

## 2025-05-03 RX ADMIN — Medication 1 TABLET(S): at 13:10

## 2025-05-03 RX ADMIN — Medication 2 TABLET(S): at 21:19

## 2025-05-03 RX ADMIN — METOPROLOL SUCCINATE 100 MILLIGRAM(S): 50 TABLET, EXTENDED RELEASE ORAL at 06:02

## 2025-05-03 RX ADMIN — Medication 2 MILLIGRAM(S): at 19:02

## 2025-05-03 RX ADMIN — SODIUM CHLORIDE 75 MILLILITER(S): 9 INJECTION, SOLUTION INTRAVENOUS at 00:20

## 2025-05-03 RX ADMIN — SEVELAMER HYDROCHLORIDE 800 MILLIGRAM(S): 800 TABLET ORAL at 17:15

## 2025-05-03 RX ADMIN — SEVELAMER HYDROCHLORIDE 800 MILLIGRAM(S): 800 TABLET ORAL at 06:02

## 2025-05-03 RX ADMIN — TRAMADOL HYDROCHLORIDE 50 MILLIGRAM(S): 50 TABLET, FILM COATED ORAL at 00:20

## 2025-05-03 RX ADMIN — Medication 650 MILLIGRAM(S): at 13:09

## 2025-05-03 RX ADMIN — SODIUM CHLORIDE 75 MILLILITER(S): 9 INJECTION, SOLUTION INTRAVENOUS at 13:21

## 2025-05-03 RX ADMIN — Medication 2 MILLIGRAM(S): at 10:49

## 2025-05-03 RX ADMIN — POLYETHYLENE GLYCOL 3350 17 GRAM(S): 17 POWDER, FOR SOLUTION ORAL at 11:20

## 2025-05-03 RX ADMIN — Medication 10 MILLIGRAM(S): at 13:00

## 2025-05-03 RX ADMIN — APIXABAN 5 MILLIGRAM(S): 2.5 TABLET, FILM COATED ORAL at 17:15

## 2025-05-03 RX ADMIN — Medication 650 MILLIGRAM(S): at 06:02

## 2025-05-03 RX ADMIN — Medication 25 MILLIGRAM(S): at 21:19

## 2025-05-03 RX ADMIN — Medication 25 MILLIGRAM(S): at 13:09

## 2025-05-03 RX ADMIN — Medication 650 MILLIGRAM(S): at 21:19

## 2025-05-03 RX ADMIN — Medication 2 MILLIGRAM(S): at 19:40

## 2025-05-03 NOTE — DIETITIAN INITIAL EVALUATION ADULT - NSFNSGIASSESSMENTFT_GEN_A_CORE
denies GI distress upon visit, no documented BM since admission, ordered for Bisacodyl, Miralax and Senna. will continue to monitor GI function.

## 2025-05-03 NOTE — DIETITIAN INITIAL EVALUATION ADULT - NSFNSPHYEXAMSKINFT_GEN_A_CORE
skin per flowsheet 5/1: sacral, left and right ischium suspected deep tissue injury     skin per wound care note 5/2: "Sacral region BL buttocks and BL ischial DTPI's"

## 2025-05-03 NOTE — DIETITIAN INITIAL EVALUATION ADULT - ORAL INTAKE PTA/DIET HISTORY
Pt confirms no known food allergies, resides in skilled nursing facility PTA. Per transfer paper, pt was on Consistent Carbohydrate diet. Denies chewing or swallowing issues. Denies GI distress. Not on protein supplements PTA, on Feso4 per transfer paper. Pt reports appetite so so PTA, reports not feeling hungry and foods not so tasty sometimes, did not elaborate more when asked.  Pt confirms no known food allergies, resides in skilled nursing facility PTA. Per transfer paper, pt was on Consistent Carbohydrate diet. Denies chewing or swallowing issues. Denies GI distress. Not on protein supplements PTA, on Feso4 per transfer paper. Pt reports appetite so so PTA, reports not feeling hungry and foods not so tasty in skilled nursing facility, did not elaborate more when asked.

## 2025-05-03 NOTE — PROGRESS NOTE ADULT - PROBLEM SELECTOR PLAN 5
- A1c: 4.6 4/3/2025  - Home Januvia 50 mg daily    > c/w ISS  > c/w POCT Blood Glucose pre-meal and at bedtime  > Hold home Januvia

## 2025-05-03 NOTE — DIETITIAN INITIAL EVALUATION ADULT - PROBLEM SELECTOR PLAN 2
- Hbg 8.5 at admission. Recent baseline ~10-11  - Pt denies melena or hematochezia  - Home ferrous sulfate 325 mg (65mg iron) daily  - Possibly progressive ACD d/t CKD.    Plan:  Hgb 10.7 4/5 -> 8.6 4/14 -> 8.5    - repeat CBC in AM, anemia w/u  - given relative stability x2w will c/w home Eliquis though low treshold to d/c if Hgb downtrend or anemia w/u c/f blood loss   - C/w home iron pill for now. (May consider to change home daily iron pill to 3 times a week).

## 2025-05-03 NOTE — PROGRESS NOTE ADULT - PROBLEM SELECTOR PLAN 8
- Prostate CA s/p radiation  - Home Myrbetriq 50mg daily    > Either oxybutynin 5mg BID, or pt can bring own home Myrbetriq   > f/u Outpatient - Prostate CA s/p radiation; unclear if this is contributing to urinary obstruction and retention   - Home Myrbetriq 50mg daily    > Either oxybutynin 5mg BID, or pt can bring own home Myrbetriq   > f/u Outpatient

## 2025-05-03 NOTE — PROGRESS NOTE ADULT - SUBJECTIVE AND OBJECTIVE BOX
NEPHROLOGY     Patient seen and examined resting on room air, reports feeling ok, denies pain, no sob, in no acute distress.     MEDICATIONS  (STANDING):  apixaban 5 milliGRAM(s) Oral two times a day  ferrous    sulfate 325 milliGRAM(s) Oral daily  hydrALAZINE 25 milliGRAM(s) Oral every 8 hours  metoprolol succinate  milliGRAM(s) Oral daily  polyethylene glycol 3350 17 Gram(s) Oral daily  senna 2 Tablet(s) Oral at bedtime  sevelamer carbonate 800 milliGRAM(s) Oral two times a day  sodium bicarbonate 650 milliGRAM(s) Oral three times a day  sodium chloride 0.45%. 1000 milliLiter(s) (75 mL/Hr) IV Continuous <Continuous>    VITALS:  T(C): , Max: 36.8 (05-02-25 @ 13:11)  T(F): , Max: 98.3 (05-02-25 @ 20:19)  HR: 108 (05-03-25 @ 04:30)  BP: 133/75 (05-03-25 @ 04:30)  RR: 18 (05-03-25 @ 04:30)  SpO2: 98% (05-03-25 @ 04:30)    I and O's:    05-02 @ 07:01  -  05-03 @ 07:00  --------------------------------------------------------  IN: 490 mL / OUT: 4350 mL / NET: -3860 mL    PHYSICAL EXAM:  Constitutional: NAD, Alert  HEENT: NCAT, MMM  Neck: Supple, No JVD  Respiratory: CTA-b/l  Cardiovascular: tachy reg s1s2  Gastrointestinal: BS+, soft, NT/ND  : (+)maldonado  Extremities: No peripheral edema b/l  Neurological: reduced generalized strength  Back: no CVAT b/l  Skin: No rashes, no nevi    LABS:                        7.2    8.98  )-----------( 190      ( 03 May 2025 06:51 )             23.8     05-03    140  |  106  |  135[H]  ----------------------------<  89  4.6   |  16[L]  |  5.49[H]    Ca    8.6      03 May 2025 06:59  Phos  5.4     05-03  Mg     2.2     05-03    TPro  7.0  /  Alb  3.1[L]  /  TBili  0.6  /  DBili  x   /  AST  13  /  ALT  14  /  AlkPhos  103  05-01    Urine Studies:  Sodium, Random Urine: 48 mmol/L (05-02 @ 05:15)  Creatinine, Random Urine: 48 mg/dL (05-02 @ 05:15)  Protein/Creatinine Ratio Calculation: 1.3 Ratio (05-02 @ 05:15)    RADIOLOGY & ADDITIONAL STUDIES:  ra< from: CT Abdomen and Pelvis No Cont (05.02.25 @ 09:35) >    IMPRESSION: Limited exam without IV contrast unable to diagnostically   evaluate the bilateral renal lesions of varying size and complexity.   Recommend renal protocol MRI with IV contrast if clinically desired      --- End of Report ---    AUTUMN GURROLA MD; Attending Radiologist  This document has been electronically signed. May  2 2025 10:43AM    < end of copied text >

## 2025-05-03 NOTE — PROGRESS NOTE ADULT - PROBLEM SELECTOR PLAN 4
- Recent hospitalization after mechanical fall.  - Chronic L knee pain due to severe OA  - Home pain as-needed pain meds include: Acetaminophen; Tramadol 50mg Q8H for pain (4-6); methocarbamol 750mg BID; lidocaine patch.    > c/w PRN Lidocaine patch  > PO pain regimen as needed: Tylenol 650mg Q6H for mild/moderate pain PRN, Tramadol 50mg BID for severe pain PRN (renally-adjusted); methocarbamol 750mg BID for muscle spasm PRN  > c/w Fall precaution  > f/u Wound care consult  > f/u PT eval --> likely back to [facility]   > f/u Nutrition consult - Recent hospitalization after mechanical fall.  - Chronic L knee pain due to severe OA  - Home pain as-needed pain meds include: Acetaminophen; Tramadol 50mg Q8H for pain (4-6); methocarbamol 750mg BID; lidocaine patch.    > c/w PRN Lidocaine patch  > PO pain regimen as needed: Tylenol 650mg Q6H for mild pain PRN, Tramadol 50mg BID for moderate pain PRN (renally-adjusted); methocarbamol 750mg BID for muscle spasm PRN. Started dilaudid 2mg q6 PRN for severe pain and dressing changes on 5/3  > c/w Fall precaution  > f/u Wound care consult  > f/u PT eval --> likely back to [facility]   > f/u Nutrition consult

## 2025-05-03 NOTE — DIETITIAN INITIAL EVALUATION ADULT - REASON INDICATOR FOR ASSESSMENT
Pt seen for consult for nutrition assessment and pressure injury stage 2/>. Information obtained from pt, RN, medical record. Chart reviewed, events noted.

## 2025-05-03 NOTE — DIETITIAN INITIAL EVALUATION ADULT - PROBLEM SELECTOR PLAN 6
- Home metoprolol succinate ER 100mg daily    Plan:  - C/w home metoprolol succinate, eliquis as above  - f/u screening EKG (not commented upon by ED)

## 2025-05-03 NOTE — DIETITIAN INITIAL EVALUATION ADULT - EDUCATION DIETARY MODIFICATIONS
pt declines need for nutrition education at present, made aware RD remains available upon request and will follow-up per protocol./(0) unable to meet; needs instruction

## 2025-05-03 NOTE — DIETITIAN INITIAL EVALUATION ADULT - PERTINENT MEDS FT
MEDICATIONS  (STANDING):  apixaban 5 milliGRAM(s) Oral two times a day  bisacodyl Suppository 10 milliGRAM(s) Rectal once  ferrous    sulfate 325 milliGRAM(s) Oral daily  hydrALAZINE 25 milliGRAM(s) Oral every 8 hours  metoprolol succinate  milliGRAM(s) Oral daily  senna 2 Tablet(s) Oral at bedtime  sevelamer carbonate 800 milliGRAM(s) Oral two times a day  sodium bicarbonate 650 milliGRAM(s) Oral three times a day  sodium chloride 0.45%. 1000 milliLiter(s) (75 mL/Hr) IV Continuous <Continuous>    MEDICATIONS  (PRN):  acetaminophen     Tablet .. 650 milliGRAM(s) Oral every 6 hours PRN Temp greater or equal to 38C (100.4F), Mild Pain (1 - 3)  HYDROmorphone   Tablet 2 milliGRAM(s) Oral every 6 hours PRN Turning in bed/cleaning and/or SEVERE pain  lidocaine   4% Patch 1 Patch Transdermal daily PRN L knee pain  methocarbamol 750 milliGRAM(s) Oral every 12 hours PRN Muscle Spasm  traMADol 50 milliGRAM(s) Oral every 12 hours PRN Moderate Pain (4 - 6)

## 2025-05-03 NOTE — DIETITIAN INITIAL EVALUATION ADULT - PROBLEM SELECTOR PLAN 7
- S/p radiation  - Home Myrbetriq 50mg daily    Plan:  - Either oxybutynin 5mg BID, or pt can bring own home Myrbetriq   - Outpatient f/u

## 2025-05-03 NOTE — PROGRESS NOTE ADULT - SUBJECTIVE AND OBJECTIVE BOX
SUBJECTIVE / OVERNIGHT EVENTS:  No overnight events, placed on NS 0.45% due to copious UOP 2/2 post-obstructive diuresis.     No complaints this AM - states he feels unchanged and came in simply due to lab findings. On further ROS did state he has some anorexia and lethargy.    MEDICATIONS  (STANDING):  apixaban 5 milliGRAM(s) Oral two times a day  ferrous    sulfate 325 milliGRAM(s) Oral daily  hydrALAZINE 25 milliGRAM(s) Oral every 8 hours  metoprolol succinate  milliGRAM(s) Oral daily  polyethylene glycol 3350 17 Gram(s) Oral daily  senna 2 Tablet(s) Oral at bedtime  sodium bicarbonate 650 milliGRAM(s) Oral three times a day  sodium chloride 0.45%. 1000 milliLiter(s) (75 mL/Hr) IV Continuous <Continuous>    MEDICATIONS  (PRN):  acetaminophen     Tablet .. 650 milliGRAM(s) Oral every 6 hours PRN Temp greater or equal to 38C (100.4F), Mild Pain (1 - 3), Moderate Pain (4 - 6)  lidocaine   4% Patch 1 Patch Transdermal daily PRN L knee pain  methocarbamol 750 milliGRAM(s) Oral every 12 hours PRN Muscle Spasm  traMADol 50 milliGRAM(s) Oral every 12 hours PRN Severe Pain (7 - 10)      I&O's Summary    02 May 2025 07:01  -  03 May 2025 07:00  --------------------------------------------------------  IN: 490 mL / OUT: 4350 mL / NET: -3860 mL      PHYSICAL EXAM:    Vital Signs Last 24 Hrs  T(C): 36.7 (03 May 2025 04:30), Max: 36.8 (02 May 2025 10:30)  T(F): 98.1 (03 May 2025 04:30), Max: 98.3 (02 May 2025 20:19)  HR: 108 (03 May 2025 04:30) (101 - 112)  BP: 133/75 (03 May 2025 04:30) (130/75 - 147/89)  RR: 18 (03 May 2025 04:30) (17 - 18)  SpO2: 98% (03 May 2025 04:30) (97% - 98%)    Parameters below as of 03 May 2025 04:30  Patient On (Oxygen Delivery Method): room air      CAPILLARY BLOOD GLUCOSE    POCT Blood Glucose.: 109 mg/dL (02 May 2025 21:33)  POCT Blood Glucose.: 122 mg/dL (02 May 2025 17:32)  POCT Blood Glucose.: 116 mg/dL (02 May 2025 12:22)  POCT Blood Glucose.: 119 mg/dL (02 May 2025 08:35)      CONSTITUTIONAL: NAD  HEENT: NC/AT  RESPIRATORY: Normal respiratory effort; lungs are clear to auscultation bilaterally  CARDIOVASCULAR: Regular rate and rhythm, normal S1 and S2, no murmur/rub/gallop; No lower extremity edema; Peripheral pulses are 2+ bilaterally  ABDOMEN: Nontender to palpation, normoactive bowel sounds, no rebound/guarding; No hepatosplenomegaly  MUSCLOSKELETAL: no clubbing or cyanosis of digits; no joint swelling or tenderness to palpation  EXTREMITIES: trace edema, distal pulses intact   NEURO: no focal deficits   PSYCH: A+O to person, place, and time; affect appropriate    LABS:                                   7.2    8.98  )-----------( 190      ( 03 May 2025 06:51 )             23.8     05-03    140  |  106  |  x   ----------------------------<  89  4.6   |  16[L]  |  5.49[H]    Ca    8.6      03 May 2025 06:59  Phos  5.4     05-03  Mg     2.2     05-03    TPro  7.0  /  Alb  3.1[L]  /  TBili  0.6  /  DBili  x   /  AST  13  /  ALT  14  /  AlkPhos  103  05-01    Urinalysis Basic - ( 02 May 2025 05:15 )    Color: Orange / Appearance: Cloudy / S.012 / pH: x  Gluc: x / Ketone: Negative mg/dL  / Bili: Negative / Urobili: 0.2 mg/dL   Blood: x / Protein: 100 mg/dL / Nitrite: Negative   Leuk Esterase: Trace / RBC: 129 /HPF / WBC 8 /HPF   Sq Epi: x / Non Sq Epi: 3 /HPF / Bacteria: Negative /HPF      IMAGING:    [X] All pertinent imaging reviewed by me SUBJECTIVE / OVERNIGHT EVENTS:  No overnight events, placed on NS 0.45% due to copious UOP 2/2 post-obstructive diuresis. Pt examined at bedside this morning. Feels very depressed about being in the hospital and is concerned about his length of stay and what his functional status will be like at home. Pt and nurse endorsed severe pain during dressing changes - pt open to trialing new pain medication. Continuing to have some anorexia and lethargy.    MEDICATIONS  (STANDING):  apixaban 5 milliGRAM(s) Oral two times a day  ferrous    sulfate 325 milliGRAM(s) Oral daily  hydrALAZINE 25 milliGRAM(s) Oral every 8 hours  metoprolol succinate  milliGRAM(s) Oral daily  polyethylene glycol 3350 17 Gram(s) Oral daily  senna 2 Tablet(s) Oral at bedtime  sevelamer carbonate 800 milliGRAM(s) Oral two times a day  sodium bicarbonate 650 milliGRAM(s) Oral three times a day  sodium chloride 0.45%. 1000 milliLiter(s) (75 mL/Hr) IV Continuous <Continuous>    MEDICATIONS  (PRN):  acetaminophen     Tablet .. 650 milliGRAM(s) Oral every 6 hours PRN Temp greater or equal to 38C (100.4F), Mild Pain (1 - 3), Moderate Pain (4 - 6)  HYDROmorphone   Tablet 2 milliGRAM(s) Oral every 6 hours PRN Turning in bed/cleaning and/or SEVERE pain  lidocaine   4% Patch 1 Patch Transdermal daily PRN L knee pain  methocarbamol 750 milliGRAM(s) Oral every 12 hours PRN Muscle Spasm  traMADol 50 milliGRAM(s) Oral every 12 hours PRN Moderate Pain (4 - 6)    I&O's Summary    02 May 2025 07:01  -  03 May 2025 07:00  --------------------------------------------------------  IN: 490 mL / OUT: 4350 mL / NET: -3860 mL      PHYSICAL EXAM:    Vital Signs Last 24 Hrs  T(C): 36.7 (03 May 2025 04:30), Max: 36.8 (02 May 2025 10:30)  T(F): 98.1 (03 May 2025 04:30), Max: 98.3 (02 May 2025 20:19)  HR: 108 (03 May 2025 04:30) (101 - 112)  BP: 133/75 (03 May 2025 04:30) (130/75 - 147/89)  RR: 18 (03 May 2025 04:30) (17 - 18)  SpO2: 98% (03 May 2025 04:30) (97% - 98%)    Parameters below as of 03 May 2025 04:30  Patient On (Oxygen Delivery Method): room air          CONSTITUTIONAL: NAD  HEENT: NC/AT  RESPIRATORY: Normal respiratory effort; lungs are clear to auscultation bilaterally  CARDIOVASCULAR: Regular rate and rhythm, normal S1 and S2, no murmur/rub/gallop; No lower extremity edema; Peripheral pulses are 2+ bilaterally  ABDOMEN: Nontender to palpation, normoactive bowel sounds, no rebound/guarding; No hepatosplenomegaly  MUSCLOSKELETAL: no clubbing or cyanosis of digits; no joint swelling or tenderness to palpation  EXTREMITIES: trace edema, distal pulses intact   NEURO: no focal deficits   PSYCH: A+O to person, place, and time; endorsing anxiety and depression     LABS:                                   7.2    8.98  )-----------( 190      ( 03 May 2025 06:51 )             23.8     05-03    140  |  106  |  x   ----------------------------<  89  4.6   |  16[L]  |  5.49[H]    Ca    8.6      03 May 2025 06:59  Phos  5.4     05-03  Mg     2.2     05-03    TPro  7.0  /  Alb  3.1[L]  /  TBili  0.6  /  DBili  x   /  AST  13  /  ALT  14  /  AlkPhos  103  05-01    Urinalysis Basic - ( 02 May 2025 05:15 )    Color: Orange / Appearance: Cloudy / S.012 / pH: x  Gluc: x / Ketone: Negative mg/dL  / Bili: Negative / Urobili: 0.2 mg/dL   Blood: x / Protein: 100 mg/dL / Nitrite: Negative   Leuk Esterase: Trace / RBC: 129 /HPF / WBC 8 /HPF   Sq Epi: x / Non Sq Epi: 3 /HPF / Bacteria: Negative /HPF      IMAGING:    [X] All pertinent imaging reviewed by me

## 2025-05-03 NOTE — DIETITIAN INITIAL EVALUATION ADULT - PERTINENT LABORATORY DATA
05-03    140  |  106  |  135[H]  ----------------------------<  89  4.6   |  16[L]  |  5.49[H]    Ca    8.6      03 May 2025 06:59  Phos  5.4     05-03  Mg     2.2     05-03    TPro  7.0  /  Alb  3.1[L]  /  TBili  0.6  /  DBili  x   /  AST  13  /  ALT  14  /  AlkPhos  103  05-01    CAPILLARY BLOOD GLUCOSE  POCT Blood Glucose.: 109 mg/dL (02 May 2025 21:33)  POCT Blood Glucose.: 122 mg/dL (02 May 2025 17:32)    A1C with Estimated Average Glucose Result: 4.6 % (04-03-25 @ 07:38)

## 2025-05-03 NOTE — PROGRESS NOTE ADULT - ASSESSMENT
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  (2)DEEDEE - obstructive - hopefully the numbers continue to improve, s/p maldonado placement upon admission...and hopefully dialysis can be avoided here  (3)Metabolic acidosis - renally mediated - on NaHCO3 650mg po tid  (4)CV - hypertension - on Hydralazine/Lopressor    RECOMMEND:  (1)Strict I/Os; start 1/2NS@75cc/h to minimize risk of hypovolemia-associated complications from post-obstructive diuresis  (2)PO NaHCO3 as ordered  (3)Antihypertensives as ordered  (4)Dose new meds for GFR <15ml/min (present dosing is acceptable)  (5)BMP+Mg+PO4 daily  (6)No HD for now    Marianela Grigsby DNP  [facility]  [phone]

## 2025-05-03 NOTE — PROGRESS NOTE ADULT - PROBLEM SELECTOR PLAN 7
- Home metoprolol succinate ER 100mg daily    > c/w home metoprolol succinate 100mg PO QD   > c/w eliquis 5mg as above  > f/u EKG

## 2025-05-03 NOTE — PROGRESS NOTE ADULT - PROBLEM SELECTOR PLAN 1
- Per nephro note in prior hospitalization: unclear if DEEDEE on CKD vs slowly progressive CKD.   - 5/1/25 Cr 5.85 at admission. (In prior hospitalization, Cr 3.9 on admission. Per Dr. Woo, baseline Cr is 2.5)  - Nephrology consulted - Dr. Hilton May (Nephrologist at [facility]): would ideally like to defer HD for now  - Was previously followed by nephrologist (Dr Mauricio Cortes) but hasn't seen for last couple of years.  - 4/25/25 Renal US: multiple bilateral renal cysts, which are increased in size and number compared to the prior CT scan - c/f neoplasm  - Urine Lytes (05/02): FeNa 4.2% suggestive of intrinsic disease  - s/p maldondao catheter placement with 2.7L fluid removed  - Ddx: Likely DEEDEE on CKD 2/2 post-renal DEEDEE s/p 2.7L fluid removed on maldonado catheter    > c/w indwelling maldonado (05/01-  > daily BMP/Mg/Phos  > c/w bicarb 650mg PO TID   > d/c Sevelamer now that phosphate has normalized   > c/w strict intake/output Q6H  > c/w NS 0.45% at 75 cc/hr if UOP continues to remain >125 cc/hr to minimize risk of hypovolemia-associated complications from post-obstructive complications   > f/u Nephrology recommendations - Per nephro note in prior hospitalization: unclear if DEEDEE on CKD vs slowly progressive CKD.   - 5/1/25 Cr 5.85 at admission. (In prior hospitalization, Cr 3.9 on admission. Per Dr. oWo, baseline Cr is 2.5)  - Nephrology consulted - Dr. Hilton May (Nephrologist at [facility]): would ideally like to defer HD for now  - Was previously followed by nephrologist (Dr Mauricio Cortes) but hasn't seen for last couple of years.  - 4/25/25 Renal US: multiple bilateral renal cysts, which are increased in size and number compared to the prior CT scan - c/f neoplasm  - Urine Lytes (05/02): FeNa 4.2% suggestive of intrinsic disease  - s/p maldonado catheter placement with 2.7L fluid removed  - Ddx: Likely DEEDEE on CKD 2/2 post-renal DEEDEE s/p 2.7L fluid removed on maldonado catheter    > c/w indwelling maldonado (05/01-  > daily BMP/Mg/Phos  > c/w bicarb 650mg PO TID   > C/w Sevelamer 800 mg BID; d/c once phos normalizes   > c/w strict intake/output Q6H  > c/w NS 0.45% at 75 cc/hr if UOP continues to remain >125 cc/hr to minimize risk of hypovolemia-associated complications from post-obstructive complications   > f/u Nephrology recommendations - Per nephro note in prior hospitalization: unclear if DEEDEE on CKD vs slowly progressive CKD.   - 5/1/25 Cr 5.85 at admission. (In prior hospitalization, Cr 3.9 on admission. Per Dr. Woo, baseline Cr is 2.5)  - Nephrology consulted - Dr. Hilton May (Nephrologist at [facility]): would ideally like to defer HD for now  - Was previously followed by nephrologist (Dr Mauricio Cortes) but hasn't seen for last couple of years.  - 4/25/25 Renal US: multiple bilateral renal cysts, which are increased in size and number compared to the prior CT scan - c/f neoplasm  - Urine Lytes (05/02): FeNa 4.2% suggestive of intrinsic disease  - s/p maldonado catheter placement with 2.7L fluid removed  - Ddx: Likely DEEDEE on CKD 2/2 post-renal DEEDEE s/p 2.7L fluid removed on maldonado catheter; now possibly also with component of pre-renal DEEDEE due to large volume UOP after maldonado placement, which may explain only slight decrease in Cr. A more notable improvement in Cr would have been expected after maldonado placement for a true post-renal DEEDEE    > c/w indwelling maldonado (05/01-  > daily BMP/Mg/Phos  > c/w bicarb 650mg PO TID   > C/w Sevelamer 800 mg BID; d/c once phos normalizes   > c/w strict intake/output Q6H  > c/w NS 0.45% at 75 cc/hr if UOP continues to remain >125 cc/hr to minimize risk of hypovolemia-associated complications from post-obstructive complications   > f/u Nephrology recommendations

## 2025-05-03 NOTE — DIETITIAN INITIAL EVALUATION ADULT - PROBLEM SELECTOR PLAN 1
- Per nephro note in prior hospitalization: unclear if DEEDEE on CKD vs slowly progressive CKD.   - 5/1/25 Cr 5.85 at admission. (In prior hospitalization, Cr 3.9 on admission. Per Dr. Woo, baseline Cr is 2.5)  - ED team spoked with Dr. Hilton May (Nephrologist at [facility]). Lalo plans to see pt tomorrow 5/2/25 to discuss about HD.  - Was previously followed by nephrologist (Dr Mauricio Cortes) but hasn't seen for last couple of years.  - 4/25//25 Renal US: multiple bilateral renal cysts, which are increased in size and number compared to the prior CT scan - c/f neoplasm    Plan:  SCr increased over time since d/c 3.82 4/5 -> 5.85   c/b metabolic acidosis   - BS, Ustudies  - monitor BMP/UOP, dose per renal fn, avoid toxins  - repeat gas in AM re stability, c/w bicarb in interim  - obtain CT A/P per radiology recommendation from US renal outpatient r/o renal mass/neoplasm (see HPI)   - followed by nephro Dr. Hilton May as outpatient, contact in AM

## 2025-05-03 NOTE — DIETITIAN INITIAL EVALUATION ADULT - PROBLEM SELECTOR PLAN 8
- DOAC re VTE PPx  - CC/DASH/TLC diet   - fall precaution  - dispo pending course, PT eval   - repeat CBC w/ diff in AM re neutrophil predominance   - nutrition evaluation (albumin low and recent hospital stay/rehab stay)

## 2025-05-03 NOTE — DIETITIAN INITIAL EVALUATION ADULT - OTHER CALCULATIONS
Fluid needs deferred to team. Energy and protein needs based on IBW of 86kg in consideration of BMI >30, increased nutrient needs, CKD not on hemodialysis

## 2025-05-03 NOTE — DIETITIAN INITIAL EVALUATION ADULT - OTHER INFO
- DM: not ordered for SSI in house  - CKD not on hemodialysis: noted abdominal renal indices and hyperphosphatemia, ordered for Renvela, NaHCO3  - on IVF NaCl 0.45% @ 75ml/hr  - Micronutrient/Other supplementation: Feso4

## 2025-05-03 NOTE — PROGRESS NOTE ADULT - PROBLEM SELECTOR PLAN 3
- Hgb trend: 10.7 (4/5) -> 8.6 (4/14) -> 8.5 (05/01)  - Pt denies melena or hematochezia  - Home ferrous sulfate 325 mg (65mg iron) daily  - Possibly progressive ACD d/t CKD.    > c/w trending HgB  > Maintain active T&S  > c/w home ferrous sulfate 325mg PO QD  > Iron studies c/w AOCD

## 2025-05-03 NOTE — DIETITIAN INITIAL EVALUATION ADULT - NSFNSADHERENCEPTAFT_GEN_A_CORE
- DM: on Lispro and Januvia per transfer paper to regulate blood glucose PTA. Most recent HbA1c of 4.6 on 4/3/25 indicates good glycemic control.   - CKD: on NaHCO3 per transfer paper

## 2025-05-03 NOTE — DIETITIAN INITIAL EVALUATION ADULT - DIET TYPE
consider diet change to Consistent Carbohydrate low sodium no concentrated phosphorus diet due to abnormal renal indices, fluids per team. RD remains available to adjust diet as needed.

## 2025-05-03 NOTE — DIETITIAN INITIAL EVALUATION ADULT - ENERGY INTAKE
Pt on DASH Consistent Carbohydrate diet in house, reports fair appetite due to feeling depressed/worried in hospital, had some egg and toast for breakfast, declines  protein milk shake when offered (states I usually does not like this). Flowsheets document PO intake % since admission. Pt declines to provide food preferences when asked but agree for RD to leave a menu at bedside. Pt made aware RD remains available upon request and will follow-up per protocol.

## 2025-05-03 NOTE — DIETITIAN INITIAL EVALUATION ADULT - PHYSCIAL ASSESSMENT
Drug Dosing Weight  Height (cm): 188 (01 May 2025 20:47)  Weight (kg): 120.2 (01 May 2025 20:47)  BMI (kg/m2): 34 (01 May 2025 20:47)    Daily weight (standing or bed scale): none documented thus far   Weight obtained by RD: 121kg/267lb (5/3 bed scale)     Weight history:   Per pt: unable to recall, reports resides in skilled nursing facility for long time  per transfer paper: 117.1kg/258lb  [facility] HIE: 115.8kg (4/17/25), 118kg (4/2/25)   ** weight fluctuation might be multifactorial: fluid shift with CKD/edema, reported inconsistent PO intake, scale accuracy.     IBW: 190lb, 141% IBW

## 2025-05-03 NOTE — DIETITIAN INITIAL EVALUATION ADULT - ORAL NUTRITION SUPPLEMENTS
Recommend trial of Prosource Gelatein Plus 1x daily (160kcal, 20g proteins) to provide additional calories and nutrients to encourage PO intake while in house/aid wound healing.

## 2025-05-03 NOTE — DIETITIAN INITIAL EVALUATION ADULT - ADD RECOMMEND
-- Consider Nephro-Ingrid supplement, pending no medical contraindications, for micronutrient support/aid wound healing.   -- Monitor PO intake, GI tolerance, skin integrity, labs, weight, and bowel movement regularity.   -- Encourage use of daily menus. Honor dietary preferences as expressed as able.

## 2025-05-03 NOTE — PROGRESS NOTE ADULT - ASSESSMENT
65 YO M w/ PMH of DM2 (on Januvia), HTN, HLD, gout, A-fib (on Eliquis), CKD not on dialysis (last documented Cr 2.82 on 4/5/25), severe OA, prostate cancer (s/p radiation), recent admission for fall discharged to [facility], admitted for elevated sCr s/p 2.7L removal on straight catheterization - likely post-renal DEEDEE.

## 2025-05-03 NOTE — PROGRESS NOTE ADULT - PROBLEM SELECTOR PLAN 9
-DVT Ppx: c/w Eliquis  -Diet: CC/DASH/TLC diet, pending nutrition recs    -Fall Precautions  -Dispo: Pending resolution of DEEDEE  -Wound Care d/c recs: "Pt will need Group 2 mattress on hospital bed and ROHO cushion for wheel chair upon discharge home"  -PT eval: likely back to [facility]

## 2025-05-03 NOTE — DIETITIAN INITIAL EVALUATION ADULT - NS FNS DIET ORDER
Diet, Consistent Carbohydrate/No Snacks:   DASH/TLC {Sodium & Cholesterol Restricted} (DASH) (05-02-25 @ 03:24) [Active]

## 2025-05-03 NOTE — PROGRESS NOTE ADULT - PROBLEM SELECTOR PLAN 2
- 4/25/25 Renal US: multiple bilateral renal cysts, which are increased in size and number compared to the prior CT scan - c/f neoplasm  - 05/02/25 CTAP w/o contrast: Limited exam without IV contrast unable to diagnostically evaluate the bilateral renal lesions of varying size and complexity. Recommend renal protocol MRI with IV contrast if clinically desired    > Plan for renal protocol MRI with IV contrast when kidney fx improves

## 2025-05-04 LAB
ADD ON TEST-SPECIMEN IN LAB: SIGNIFICANT CHANGE UP
ALBUMIN SERPL ELPH-MCNC: 3 G/DL — LOW (ref 3.3–5)
ALP SERPL-CCNC: 77 U/L — SIGNIFICANT CHANGE UP (ref 40–120)
ALT FLD-CCNC: 9 U/L — LOW (ref 10–45)
ANION GAP SERPL CALC-SCNC: 17 MMOL/L — SIGNIFICANT CHANGE UP (ref 5–17)
AST SERPL-CCNC: 9 U/L — LOW (ref 10–40)
BASOPHILS # BLD AUTO: 0.01 K/UL — SIGNIFICANT CHANGE UP (ref 0–0.2)
BASOPHILS NFR BLD AUTO: 0.1 % — SIGNIFICANT CHANGE UP (ref 0–2)
BILIRUB SERPL-MCNC: 0.6 MG/DL — SIGNIFICANT CHANGE UP (ref 0.2–1.2)
BLD GP AB SCN SERPL QL: NEGATIVE — SIGNIFICANT CHANGE UP
BUN SERPL-MCNC: 106 MG/DL — HIGH (ref 7–23)
CALCIUM SERPL-MCNC: 8.6 MG/DL — SIGNIFICANT CHANGE UP (ref 8.4–10.5)
CHLORIDE SERPL-SCNC: 106 MMOL/L — SIGNIFICANT CHANGE UP (ref 96–108)
CO2 SERPL-SCNC: 17 MMOL/L — LOW (ref 22–31)
CREAT SERPL-MCNC: 5.36 MG/DL — HIGH (ref 0.5–1.3)
EGFR: 11 ML/MIN/1.73M2 — LOW
EGFR: 11 ML/MIN/1.73M2 — LOW
EOSINOPHIL # BLD AUTO: 0.14 K/UL — SIGNIFICANT CHANGE UP (ref 0–0.5)
EOSINOPHIL NFR BLD AUTO: 1.6 % — SIGNIFICANT CHANGE UP (ref 0–6)
GAS PNL BLDV: SIGNIFICANT CHANGE UP
GLUCOSE SERPL-MCNC: 86 MG/DL — SIGNIFICANT CHANGE UP (ref 70–99)
HCT VFR BLD CALC: 22.7 % — LOW (ref 39–50)
HCT VFR BLD CALC: 23.3 % — LOW (ref 39–50)
HGB BLD-MCNC: 6.9 G/DL — CRITICAL LOW (ref 13–17)
HGB BLD-MCNC: 7.1 G/DL — LOW (ref 13–17)
IMM GRANULOCYTES NFR BLD AUTO: 0.6 % — SIGNIFICANT CHANGE UP (ref 0–0.9)
LYMPHOCYTES # BLD AUTO: 0.3 K/UL — LOW (ref 1–3.3)
LYMPHOCYTES # BLD AUTO: 3.5 % — LOW (ref 13–44)
MAGNESIUM SERPL-MCNC: 2 MG/DL — SIGNIFICANT CHANGE UP (ref 1.6–2.6)
MCHC RBC-ENTMCNC: 28.2 PG — SIGNIFICANT CHANGE UP (ref 27–34)
MCHC RBC-ENTMCNC: 29.2 PG — SIGNIFICANT CHANGE UP (ref 27–34)
MCHC RBC-ENTMCNC: 29.6 G/DL — LOW (ref 32–36)
MCHC RBC-ENTMCNC: 31.3 G/DL — LOW (ref 32–36)
MCV RBC AUTO: 93.4 FL — SIGNIFICANT CHANGE UP (ref 80–100)
MCV RBC AUTO: 95.1 FL — SIGNIFICANT CHANGE UP (ref 80–100)
MONOCYTES # BLD AUTO: 0.93 K/UL — HIGH (ref 0–0.9)
MONOCYTES NFR BLD AUTO: 11 % — SIGNIFICANT CHANGE UP (ref 2–14)
NEUTROPHILS # BLD AUTO: 7.06 K/UL — SIGNIFICANT CHANGE UP (ref 1.8–7.4)
NEUTROPHILS NFR BLD AUTO: 83.2 % — HIGH (ref 43–77)
NRBC BLD AUTO-RTO: 0 /100 WBCS — SIGNIFICANT CHANGE UP (ref 0–0)
NRBC BLD AUTO-RTO: 0 /100 WBCS — SIGNIFICANT CHANGE UP (ref 0–0)
PHOSPHATE SERPL-MCNC: 5.6 MG/DL — HIGH (ref 2.5–4.5)
PLATELET # BLD AUTO: 169 K/UL — SIGNIFICANT CHANGE UP (ref 150–400)
PLATELET # BLD AUTO: 174 K/UL — SIGNIFICANT CHANGE UP (ref 150–400)
POTASSIUM SERPL-MCNC: 4.2 MMOL/L — SIGNIFICANT CHANGE UP (ref 3.5–5.3)
POTASSIUM SERPL-SCNC: 4.2 MMOL/L — SIGNIFICANT CHANGE UP (ref 3.5–5.3)
PROT SERPL-MCNC: 6.2 G/DL — SIGNIFICANT CHANGE UP (ref 6–8.3)
RBC # BLD: 2.43 M/UL — LOW (ref 4.2–5.8)
RBC # BLD: 2.45 M/UL — LOW (ref 4.2–5.8)
RBC # FLD: 13.7 % — SIGNIFICANT CHANGE UP (ref 10.3–14.5)
RBC # FLD: 13.7 % — SIGNIFICANT CHANGE UP (ref 10.3–14.5)
RETICS #: 42.9 K/UL — SIGNIFICANT CHANGE UP (ref 25–125)
RETICS/RBC NFR: 1.8 % — SIGNIFICANT CHANGE UP (ref 0.5–2.5)
RH IG SCN BLD-IMP: POSITIVE — SIGNIFICANT CHANGE UP
SODIUM SERPL-SCNC: 140 MMOL/L — SIGNIFICANT CHANGE UP (ref 135–145)
WBC # BLD: 8.46 K/UL — SIGNIFICANT CHANGE UP (ref 3.8–10.5)
WBC # BLD: 8.49 K/UL — SIGNIFICANT CHANGE UP (ref 3.8–10.5)
WBC # FLD AUTO: 8.46 K/UL — SIGNIFICANT CHANGE UP (ref 3.8–10.5)
WBC # FLD AUTO: 8.49 K/UL — SIGNIFICANT CHANGE UP (ref 3.8–10.5)

## 2025-05-04 PROCEDURE — 99233 SBSQ HOSP IP/OBS HIGH 50: CPT | Mod: GC

## 2025-05-04 RX ORDER — SODIUM CHLORIDE 9 G/1000ML
1000 INJECTION, SOLUTION INTRAVENOUS
Refills: 0 | Status: DISCONTINUED | OUTPATIENT
Start: 2025-05-04 | End: 2025-05-05

## 2025-05-04 RX ORDER — SODIUM BICARBONATE 1 MEQ/ML
975 SYRINGE (ML) INTRAVENOUS THREE TIMES A DAY
Refills: 0 | Status: DISCONTINUED | OUTPATIENT
Start: 2025-05-04 | End: 2025-05-05

## 2025-05-04 RX ADMIN — Medication 25 MILLIGRAM(S): at 05:51

## 2025-05-04 RX ADMIN — Medication 25 MILLIGRAM(S): at 21:28

## 2025-05-04 RX ADMIN — Medication 975 MILLIGRAM(S): at 22:06

## 2025-05-04 RX ADMIN — LIDOCAINE HYDROCHLORIDE 1 PATCH: 20 JELLY TOPICAL at 22:08

## 2025-05-04 RX ADMIN — SEVELAMER HYDROCHLORIDE 800 MILLIGRAM(S): 800 TABLET ORAL at 05:50

## 2025-05-04 RX ADMIN — Medication 325 MILLIGRAM(S): at 11:15

## 2025-05-04 RX ADMIN — SODIUM CHLORIDE 75 MILLILITER(S): 9 INJECTION, SOLUTION INTRAVENOUS at 11:16

## 2025-05-04 RX ADMIN — APIXABAN 5 MILLIGRAM(S): 2.5 TABLET, FILM COATED ORAL at 17:22

## 2025-05-04 RX ADMIN — Medication 25 MILLIGRAM(S): at 13:16

## 2025-05-04 RX ADMIN — APIXABAN 5 MILLIGRAM(S): 2.5 TABLET, FILM COATED ORAL at 05:50

## 2025-05-04 RX ADMIN — SEVELAMER HYDROCHLORIDE 800 MILLIGRAM(S): 800 TABLET ORAL at 17:21

## 2025-05-04 RX ADMIN — Medication 975 MILLIGRAM(S): at 13:17

## 2025-05-04 RX ADMIN — Medication 1 TABLET(S): at 11:14

## 2025-05-04 RX ADMIN — Medication 650 MILLIGRAM(S): at 05:50

## 2025-05-04 RX ADMIN — SODIUM CHLORIDE 100 MILLILITER(S): 9 INJECTION, SOLUTION INTRAVENOUS at 11:30

## 2025-05-04 RX ADMIN — TRAMADOL HYDROCHLORIDE 50 MILLIGRAM(S): 50 TABLET, FILM COATED ORAL at 21:30

## 2025-05-04 RX ADMIN — TRAMADOL HYDROCHLORIDE 50 MILLIGRAM(S): 50 TABLET, FILM COATED ORAL at 21:31

## 2025-05-04 RX ADMIN — Medication 2 MILLIGRAM(S): at 23:31

## 2025-05-04 RX ADMIN — METOPROLOL SUCCINATE 100 MILLIGRAM(S): 50 TABLET, EXTENDED RELEASE ORAL at 05:50

## 2025-05-04 NOTE — DISCHARGE NOTE PROVIDER - NSDCCPCAREPLAN_GEN_ALL_CORE_FT
PRINCIPAL DISCHARGE DIAGNOSIS  Diagnosis: Acute kidney injury superimposed on CKD  Assessment and Plan of Treatment: You came into the hospital with worsening kidney damage on top of your pre-existing renal disease. This also resulted in acidosis of your blood, elevated phosphate levels, and a decreased Hemoglobin. You received a maldonado catheter which resulted in 2.7L of urine output in the emergency room, which may have been what caused your kidney injury. You also received fluid replenishment, and medications to reduce the acid and phosphate levels in your blood.  On discharge, please follow-up with your primary care physician and nephrologist within 1 week of discharge.     PRINCIPAL DISCHARGE DIAGNOSIS  Diagnosis: Acute kidney injury superimposed on CKD  Assessment and Plan of Treatment: You came into the hospital with worsening kidney damage on top of your pre-existing renal disease. This also resulted in acidosis of your blood, elevated phosphate levels, and a decreased Hemoglobin. You received a maldonado catheter which resulted in 2.7L of urine output in the emergency room, which may have been what caused your kidney injury. You also received fluid replenishment, and medications to reduce the acid and phosphate levels in your blood.  It is possible that an enlarged prostate contributed to your urinary retention - we started a medicine called Flomax that can help you urinate. You can continue this medicine and you should follow up with a urologist after discharge. Your imaging also showed cysts on your kidneys, which should be investigated further as an outpatient - please discuss this with your PCP and kidney doctor.  On discharge, please follow-up with your primary care physician and nephrologist within 1 week of discharge to monitor your kidney function and review your medications.      SECONDARY DISCHARGE DIAGNOSES  Diagnosis: E-coli UTI  Assessment and Plan of Treatment: In the hospital, you were found to have a urinary tract infection with a common bacteria called E. coli. You saw our infectious disease doctors, and completed a 7-day course of antibiotics.   Please tell the staff at rehab if you develop any discomfort while urinating, difficulty urinating, changes in color or smell of the urine, or pain in your lower abdomen.     PRINCIPAL DISCHARGE DIAGNOSIS  Diagnosis: Acute kidney injury superimposed on CKD  Assessment and Plan of Treatment: You came into the hospital with worsening kidney damage on top of your pre-existing renal disease. This also resulted in acidosis of your blood, elevated phosphate levels, and a decreased Hemoglobin. You received a maldonado catheter which resulted in 2.7L of urine output in the emergency room, which is the likely cause of your kidney injury. You also received fluid replenishment, and medications to reduce the acid and phosphate levels in your blood.  It is possible that an enlarged prostate contributed to your urinary retention - we started a medicine called Flomax that can help you urinate. You can continue this medicine and you should follow up with a urologist after discharge. We will discharge you with a Maldonado catheter in place - we tried to remove the catheter but you continued to retain urine. Your imaging also showed cysts on your kidneys, which should be investigated further as an outpatient - please discuss this with your PCP and kidney doctor Dr. May.  On discharge, please follow-up with your primary care physician and nephrologist within 1 week of discharge to monitor your kidney function and review your medications. You will also need to follow up with a urologist for urinary retention.  MEDICATION CHANGES  - Started Flomax to help with urine flow and follow up with urology  - Started bicarb tablets and follow up with nephrology  - Current pain regimen - Dilaudid 2mg PO q6 PRN, Gabapentin 100mg qd, Robaxin 750mg BID PRN, Tylenol PRN and follow up with PCP  - Discontinue Mirabegron  - Discontinue Januvia  You were also evaluated by our orthopedic surgery team while inpatient for your knee pain - we will provide you with a referral for outpatient followup to discuss surgical planning.      SECONDARY DISCHARGE DIAGNOSES  Diagnosis: E-coli UTI  Assessment and Plan of Treatment: In the hospital, you were found to have a urinary tract infection with a common bacteria called E. coli. You saw our infectious disease doctors, and completed a 7-day course of antibiotics.   Please tell the staff at rehab if you develop any discomfort while urinating, difficulty urinating, changes in color or smell of the urine, or pain in your lower abdomen.

## 2025-05-04 NOTE — DISCHARGE NOTE PROVIDER - CARE PROVIDER_API CALL
Cait Egan  Geriatric Medicine  [address] [place] [address]  Phone: [phone]  Fax: [phone]  Established Patient  Follow Up Time: 1 week    Hilton May  Nephrology  [address]  [place] [address]  Phone: [phone]  Fax: [phone]  Established Patient  Follow Up Time: 1 week

## 2025-05-04 NOTE — PROGRESS NOTE ADULT - PROBLEM SELECTOR PLAN 3
- Hgb trend: 10.7 (4/5) -> 8.6 (4/14) -> 8.5 (05/01)  - Pt denies melena or hematochezia  - Home ferrous sulfate 325 mg (65mg iron) daily  - Iron studies c/w AOCD  - Possibly progressive ACD d/t CKD.    > c/w trending HgB  > Maintain active T&S (last 05/04)  > c/w home ferrous sulfate 325mg PO QD - Hgb trend: 10.7 (4/5) -> 8.6 (4/14) -> 8.5 (05/01)  - Pt denies melena or hematochezia  - Home ferrous sulfate 325 mg (65mg iron) daily  - Iron studies c/w AOCD  - Reticulocyte index: 0.5, likely 2/2 CKD   - Possibly progressive ACD d/t CKD.    > Repeat CBC @Noon today -> transfuse 1U pRBC if HgB <7  > c/w trending HgB  > Maintain active T&S (last 05/04)  > c/w home ferrous sulfate 325mg PO QD

## 2025-05-04 NOTE — PROGRESS NOTE ADULT - PROBLEM SELECTOR PLAN 1
- Per nephro note in prior hospitalization: unclear if DEEDEE on CKD vs slowly progressive CKD.   - 5/1/25 Cr 5.85 at admission. (In prior hospitalization, Cr 3.9 on admission. Per Dr. Woo, baseline Cr is 2.5)  - Nephrology consulted - Dr. Hilton May (Nephrologist at [facility]): would ideally like to defer HD for now  - Was previously followed by nephrologist (Dr Mauricio Cortes) but hasn't seen for last couple of years.  - 4/25/25 Renal US: multiple bilateral renal cysts, which are increased in size and number compared to the prior CT scan - c/f neoplasm  - Urine Lytes (05/02): FeNa 4.2% suggestive of intrinsic disease  - s/p maldonado catheter placement in ER with 2.7L fluid removed  - Ddx: Likely DEEDEE on CKD 2/2 post-renal DEEDEE s/p 2.7L fluid removed on maldonado catheter; now possibly also with component of pre-renal DEEDEE due to large volume UOP after maldonado placement, which may explain only slight decrease in Cr. A more notable improvement in Cr would have been expected after maldonado placement for a true post-renal DEEDEE    > c/w indwelling maldonado (05/01-  > daily BMP/Mg/Phos  > c/w bicarb 650mg PO TID   > C/w Sevelamer 800 mg BID; d/c once phos normalizes   > c/w strict intake/output Q6H  > c/w NS 0.45% at 75 cc/hr if UOP continues to remain >125 cc/hr to minimize risk of hypovolemia-associated complications from post-obstructive complications   > f/u Nephrology recommendations - Per nephro note in prior hospitalization: unclear if DEEDEE on CKD vs slowly progressive CKD.   - 5/1/25 Cr 5.85 at admission. (In prior hospitalization, Cr 3.9 on admission. Per Dr. Woo, baseline Cr is 2.5)  - Nephrology consulted - Dr. Hilton May (Nephrologist at [facility]): would ideally like to defer HD for now  - Was previously followed by nephrologist (Dr Mauricio Cortes) but hasn't seen for last couple of years.  - 4/25/25 Renal US: multiple bilateral renal cysts, which are increased in size and number compared to the prior CT scan - c/f neoplasm  - Urine Lytes (05/02): FeNa 4.2% suggestive of intrinsic disease  - s/p maldonado catheter placement in ER with 2.7L fluid removed  - Ddx: Likely DEEDEE on CKD 2/2 post-renal DEEDEE s/p 2.7L fluid removed on maldonado catheter; now possibly also with component of pre-renal DEEDEE due to large volume UOP after maldonado placement, which may explain only slight decrease in Cr. A more notable improvement in Cr would have been expected after maldonado placement for a true post-renal DEEDEE    > c/w indwelling maldonado (05/01-  > daily BMP/Mg/Phos  > c/w bicarb 975mg PO TID (up-titrated 05/03)   > C/w Sevelamer 800 mg BID; d/c once phos normalizes   > c/w strict intake/output Q6H  > c/w NS 0.45% at 100 cc/hr if UOP continues to remain >125 cc/hr to minimize risk of hypovolemia-associated complications from post-obstructive complications (up-titrated 75cc->100cc on 05/04)  > f/u Nephrology recommendations

## 2025-05-04 NOTE — DISCHARGE NOTE PROVIDER - NSDCMRMEDTOKEN_GEN_ALL_CORE_FT
ammonium lactate 12% topical lotion: 1 Apply topically to affected area 2 times a day  Eliquis 5 mg oral tablet: 1 tab(s) orally 2 times a day  ferrous sulfate: 325 milligram(s) orally once a day  hydrALAZINE 25 mg oral tablet: 1 tab(s) orally every 8 hours  insulin lispro 100 units/mL injectable solution: 1 international unit(s) injectable 3 times a day (before meals) 1 Unit(s) if Glucose 151 - 200  2 Unit(s) if Glucose 201 - 250  3 Unit(s) if Glucose 251 - 300  4 Unit(s) if Glucose 301 - 350  5 Unit(s) if Glucose 351 - 400  6 Unit(s) if Glucose Greater Than 400  Januvia 50 mg oral tablet: 1 tab(s) orally once a day  lidocaine 4% topical film: Apply topically to affected area once a day  methocarbamol 750 mg oral tablet: 2 tab(s) orally 2 times a day as needed for Pain  Myrbetriq 50 mg oral tablet, extended release: 1 tab(s) orally once a day  sodium bicarbonate 650 mg oral tablet: 1 tab(s) orally 3 times a day  Toprol- mg oral tablet, extended release: 1 tab(s) orally once a day  traMADol 50 mg oral tablet: 1 tab(s) orally 3 times a day as needed for Pain   ammonium lactate 12% topical lotion: 1 Apply topically to affected area 2 times a day  Dilaudid 2 mg oral tablet: 1 tab(s) orally every 6 hours As needed Turning in bed/cleaning and/or SEVERE pain  Eliquis 5 mg oral tablet: 1 tab(s) orally 2 times a day  ferrous sulfate: 325 milligram(s) orally once a day  Flomax 0.4 mg oral capsule: 1 cap(s) orally once a day (at bedtime)  gabapentin 100 mg oral capsule: 1 cap(s) orally once a day  hydrALAZINE 25 mg oral tablet: 1 tab(s) orally every 8 hours  lidocaine 4% topical film: Apply topically to affected area once a day  Melatonin 3 mg oral tablet: 1 tab(s) orally once a day (at bedtime)  methocarbamol 750 mg oral tablet: 1 tab(s) orally every 12 hours As needed Muscle Spasm  multivitamin: 1 tab(s) orally once a day daily  polyethylene glycol 3350 oral powder for reconstitution: 17 gram(s) orally 2 times a day  senna leaf extract oral tablet: 2 tab(s) orally once a day (at bedtime)  simethicone 80 mg oral tablet, chewable: 1 tab(s) orally 3 times a day As needed Gas  sodium bicarbonate 325 mg oral tablet: 1 tab(s) orally every 12 hours  Toprol- mg oral tablet, extended release: 1 tab(s) orally once a day  Tylenol 325 mg oral tablet: 2 tab(s) orally every 6 hours As needed Moderate Pain (4 - 6)

## 2025-05-04 NOTE — PROGRESS NOTE ADULT - PROBLEM SELECTOR PLAN 8
- Prostate CA s/p radiation; unclear if this is contributing to urinary obstruction and retention   - Home Myrbetriq 50mg daily    > Either oxybutynin 5mg BID, or pt can bring own home Myrbetriq   > f/u Outpatient

## 2025-05-04 NOTE — DISCHARGE NOTE PROVIDER - NSDCCPTREATMENT_GEN_ALL_CORE_FT
PRINCIPAL PROCEDURE  Procedure: CT abdomen and pelvis  Findings and Treatment: PROCEDURE:  CT of the Abdomen and Pelvis was performed.  Sagittal and coronal reformats were performed.  FINDINGS: Evaluation is less sensitive without IV contrast.  LOWER CHEST: Mild dependent atelectasis. Coronary artery calcifications.  Hypodensity of the blood pool in relation to left ventricular myocardium   suggests underlying anemia.  Mild bilateral gynecomastia.  LIVER: Few scattered hypodense lesions are incompletely assessed without   IV contrast  BILE DUCTS: Normal caliber.  GALLBLADDER: Mildly distended. No wall thickening or surrounding   inflammation. Filled with sludge. Contains small layering gallstones  SPLEEN: Within normal size limits.  PANCREAS: Unremarkable as visualized.  ADRENALS: Bilateral nodular thickening  KIDNEYS/URETERS: No renal stones or hydronephrosis.  Bilateral renal lesions of varying size complexity are incompletely   assessed without IV contrast.  BLADDER: Minimally distended.Diffuse wall thickening. Contains [place]   catheter and intraluminal air  REPRODUCTIVE ORGANS: Prostate within normal size limits. Stereotactic   markers within the prostate  BOWEL: No bowel obstruction. Colonic diverticulosis.  Large stool ball measuring 8.3 cm within the rectum.  PERITONEUM/RETROPERITONEUM: Presacral edema.  VESSELS: Mild atherosclerosis  LYMPH NODES: Mild aortocaval, right common iliac and external iliac   lymphadenopathy  ABDOMINAL WALL: No significant acute abnormality.  BONES: Multilevel degenerative changes throughout the spine. Advanced   osteoarthritis of both hip joints.  IMPRESSION: Limited exam without IV contrast unable to diagnostically   evaluate the bilateral renal lesions of varying size and complexity.   Recommend renal protocol MRI with IV contrast if clinically desired

## 2025-05-04 NOTE — DISCHARGE NOTE PROVIDER - INSTRUCTIONS
Diet, Consistent Carbohydrate/No Snacks:   Low Sodium  No Concentrated Phosphorus  Prosource Gelatein Plus     Qty per Day:  1 (05-03-25 @ 12:46) [Active]

## 2025-05-04 NOTE — DISCHARGE NOTE PROVIDER - PROVIDER TOKENS
PROVIDER:[TOKEN:[[id number]],FOLLOWUP:[1 week],ESTABLISHEDPATIENT:[T]],PROVIDER:[[id number]],FOLLOWUP:[1 week],ESTABLISHEDPATIENT:[T]]

## 2025-05-04 NOTE — PROGRESS NOTE ADULT - SUBJECTIVE AND OBJECTIVE BOX
"Subjective: Pt agreeable to therapeutic plan of care.    Weight Bearing Status: NWB RLE    Objective:     Bed mobility - Supine to sitting SBA using bedrails.     Transfers - x3 STS from highly elevated EOB CGA and with rolling walker. Pt able to comply well with NWB RLE.     Ambulation - 5 feet CGA and with rolling walker with hop-to pattern, with pt able to comply well with NWB RLE    Therapeutic Exercise - 10 Reps B LE AROM supported sitting / chair. While standing, pt instructed on reaching with each UE x10 reps while using other UE to hold onto RW, and maintain NWB status. Pt then instructed to pull brief up while standing, with increased difficulty maintaining NWB status with this task.     Vitals: WNL    Pain: 4 VAS   Location: RLE  Intervention for pain: Repositioned, Increased Activity, and Therapeutic Presence    Education: Provided education on the importance of mobility in the acute care setting, Verbal/Tactile Cues, ADL training, Transfer Training, Gait Training, and WB'ing status    Assessment: Tha Barron presents with functional mobility impairments which indicate the need for skilled intervention. Tolerating session today without incident. Pt reporting improvement in pain control this date. Pt demonstrates appropriate compliance with NWB status on RLE while performing functional tasks, and able to take a few steps with hop-to pattern to recliner chair using RW. Pt highly motivated to achieve highest level of functional independence. Will continue to follow and progress as tolerated.     Plan/Recommendations:   If medically appropriate, Moderate Intensity Therapy recommended post-acute care. This is recommended as therapy feels the patient would require 3-4 days per week and wouldn't tolerate \"3 hour daily\" rehab intensity. SNF would be the preferred choice. If the patient does not agree to SNF, arrange HH or OP depending on home bound status. If patient is medically complex, consider " LTACH. Pt requires no DME at discharge.     Pt desires Skilled Rehab placement at discharge. Pt cooperative; agreeable to therapeutic recommendations and plan of care.         Basic Mobility 6-click:  Rollin = Total, A lot = 2, A little = 3; 4 = None  Supine>Sit:   1 = Total, A lot = 2, A little = 3; 4 = None   Sit>Stand with arms:  1 = Total, A lot = 2, A little = 3; 4 = None  Bed>Chair:   1 = Total, A lot = 2, A little = 3; 4 = None  Ambulate in room:  1 = Total, A lot = 2, A little = 3; 4 = None  3-5 Steps with railin = Total, A lot = 2, A little = 3; 4 = None  Score: 15    Modified Maile: N/A = No pre-op stroke/TIA    Post-Tx Position: Up in Chair, Alarms activated, and Call light and personal items within reach  PPE: gloves and gown     SUBJECTIVE / OVERNIGHT EVENTS:  Pt seen and examined at bedside. Dilaudid PRN added for severe pain/pain associated with turning yesterday; lactulose PRN also added and bowel regimen up-titrated to Miralax BID + suppository 1x given yesterday.  GLENNA Continues to feel depressed about his functional status and frustrated about not understanding the etiology behind his DEEDEE.     MEDICATIONS  (STANDING):  apixaban 5 milliGRAM(s) Oral two times a day  ferrous    sulfate 325 milliGRAM(s) Oral daily  hydrALAZINE 25 milliGRAM(s) Oral every 8 hours  metoprolol succinate  milliGRAM(s) Oral daily  Nephro-shannon 1 Tablet(s) Oral daily  polyethylene glycol 3350 17 Gram(s) Oral two times a day  senna 2 Tablet(s) Oral at bedtime  sevelamer carbonate 800 milliGRAM(s) Oral two times a day  sodium bicarbonate 650 milliGRAM(s) Oral three times a day  sodium chloride 0.45%. 1000 milliLiter(s) (75 mL/Hr) IV Continuous <Continuous>    MEDICATIONS  (PRN):  acetaminophen     Tablet .. 650 milliGRAM(s) Oral every 6 hours PRN Temp greater or equal to 38C (100.4F), Mild Pain (1 - 3)  HYDROmorphone   Tablet 2 milliGRAM(s) Oral every 6 hours PRN Turning in bed/cleaning and/or SEVERE pain  lactulose Syrup 10 Gram(s) Oral once PRN constipation  lidocaine   4% Patch 1 Patch Transdermal daily PRN L knee pain  methocarbamol 750 milliGRAM(s) Oral every 12 hours PRN Muscle Spasm  traMADol 50 milliGRAM(s) Oral every 12 hours PRN Moderate Pain (4 - 6)        05-03-25 @ 07:01  -  05-04-25 @ 07:00  --------------------------------------------------------  IN: 360 mL / OUT: 4500 mL / NET: -4140 mL        PHYSICAL EXAM:  Vital Signs Last 24 Hrs  T(C): 37 (04 May 2025 04:53), Max: 37.7 (03 May 2025 11:52)  T(F): 98.6 (04 May 2025 04:53), Max: 99.8 (03 May 2025 11:52)  HR: 109 (04 May 2025 04:53) (107 - 116)  BP: 137/89 (04 May 2025 04:53) (137/89 - 151/102)  BP(mean): --  RR: 18 (04 May 2025 04:53) (18 - 18)  SpO2: 97% (04 May 2025 04:53) (97% - 99%)    Parameters below as of 04 May 2025 04:53  Patient On (Oxygen Delivery Method): room air        CAPILLARY BLOOD GLUCOSE        I&O's Summary    03 May 2025 07:01  -  04 May 2025 07:00  --------------------------------------------------------  IN: 360 mL / OUT: 4500 mL / NET: -4140 mL      CONSTITUTIONAL: NAD  HEENT: NC/AT  RESPIRATORY: Normal respiratory effort; lungs are clear to auscultation bilaterally  CARDIOVASCULAR: Regular rate and rhythm, normal S1 and S2, no murmur/rub/gallop; No lower extremity edema; Peripheral pulses are 2+ bilaterally  ABDOMEN: Nontender to palpation, normoactive bowel sounds, no rebound/guarding; No hepatosplenomegaly  MUSCLOSKELETAL: no clubbing or cyanosis of digits; no joint swelling or tenderness to palpation  EXTREMITIES: trace edema, distal pulses intact   NEURO: no focal deficits   PSYCH: A+O to person, place, and time; endorsing anxiety and depression       LABS:                        7.2    8.98  )-----------( 190      ( 03 May 2025 06:51 )             23.8     05-03    140  |  106  |  135[H]  ----------------------------<  89  4.6   |  16[L]  |  5.49[H]    Ca    8.6      03 May 2025 06:59  Phos  5.4     05-03  Mg     2.2     05-03            Urinalysis Basic - ( 03 May 2025 06:59 )    Color: x / Appearance: x / SG: x / pH: x  Gluc: 89 mg/dL / Ketone: x  / Bili: x / Urobili: x   Blood: x / Protein: x / Nitrite: x   Leuk Esterase: x / RBC: x / WBC x   Sq Epi: x / Non Sq Epi: x / Bacteria: x          IMAGING:    [X] All pertinent imaging reviewed by me SUBJECTIVE / OVERNIGHT EVENTS:  Pt seen and examined at bedside. Dilaudid PRN added for severe pain/pain associated with turning yesterday; lactulose PRN also added and bowel regimen up-titrated to Miralax BID + suppository 1x given yesterday.  GLENNA Continues to feel depressed about his functional status and frustrated about not understanding the etiology behind his DEEDEE. Informed about his HgB: 6.9, and he may receive pRBC if repeat CBC @noon is consistent - blood consent form signed at bedside.    MEDICATIONS  (STANDING):  apixaban 5 milliGRAM(s) Oral two times a day  ferrous    sulfate 325 milliGRAM(s) Oral daily  hydrALAZINE 25 milliGRAM(s) Oral every 8 hours  metoprolol succinate  milliGRAM(s) Oral daily  Nephro-shannon 1 Tablet(s) Oral daily  polyethylene glycol 3350 17 Gram(s) Oral two times a day  senna 2 Tablet(s) Oral at bedtime  sevelamer carbonate 800 milliGRAM(s) Oral two times a day  sodium bicarbonate 650 milliGRAM(s) Oral three times a day  sodium chloride 0.45%. 1000 milliLiter(s) (75 mL/Hr) IV Continuous <Continuous>    MEDICATIONS  (PRN):  acetaminophen     Tablet .. 650 milliGRAM(s) Oral every 6 hours PRN Temp greater or equal to 38C (100.4F), Mild Pain (1 - 3)  HYDROmorphone   Tablet 2 milliGRAM(s) Oral every 6 hours PRN Turning in bed/cleaning and/or SEVERE pain  lactulose Syrup 10 Gram(s) Oral once PRN constipation  lidocaine   4% Patch 1 Patch Transdermal daily PRN L knee pain  methocarbamol 750 milliGRAM(s) Oral every 12 hours PRN Muscle Spasm  traMADol 50 milliGRAM(s) Oral every 12 hours PRN Moderate Pain (4 - 6)        05-03-25 @ 07:01  -  05-04-25 @ 07:00  --------------------------------------------------------  IN: 360 mL / OUT: 4500 mL / NET: -4140 mL        PHYSICAL EXAM:  Vital Signs Last 24 Hrs  T(C): 37 (04 May 2025 04:53), Max: 37.7 (03 May 2025 11:52)  T(F): 98.6 (04 May 2025 04:53), Max: 99.8 (03 May 2025 11:52)  HR: 109 (04 May 2025 04:53) (107 - 116)  BP: 137/89 (04 May 2025 04:53) (137/89 - 151/102)  BP(mean): --  RR: 18 (04 May 2025 04:53) (18 - 18)  SpO2: 97% (04 May 2025 04:53) (97% - 99%)    Parameters below as of 04 May 2025 04:53  Patient On (Oxygen Delivery Method): room air        CAPILLARY BLOOD GLUCOSE        I&O's Summary    03 May 2025 07:01  -  04 May 2025 07:00  --------------------------------------------------------  IN: 360 mL / OUT: 4500 mL / NET: -4140 mL      CONSTITUTIONAL: NAD  HEENT: NC/AT  RESPIRATORY: Normal respiratory effort; lungs are clear to auscultation bilaterally  CARDIOVASCULAR: Regular rate and rhythm, normal S1 and S2, no murmur/rub/gallop; No lower extremity edema; Peripheral pulses are 2+ bilaterally  ABDOMEN: Nontender to palpation, normoactive bowel sounds, no rebound/guarding; No hepatosplenomegaly  MUSCLOSKELETAL: no clubbing or cyanosis of digits; no joint swelling or tenderness to palpation  EXTREMITIES: trace edema, distal pulses intact, LLE ttp both anterior/posterior.   NEURO: no focal deficits   PSYCH: A+O to person, place, and time; endorsing anxiety and depression       LABS:                        7.2    8.98  )-----------( 190      ( 03 May 2025 06:51 )             23.8     05-03    140  |  106  |  135[H]  ----------------------------<  89  4.6   |  16[L]  |  5.49[H]    Ca    8.6      03 May 2025 06:59  Phos  5.4     05-03  Mg     2.2     05-03            Urinalysis Basic - ( 03 May 2025 06:59 )    Color: x / Appearance: x / SG: x / pH: x  Gluc: 89 mg/dL / Ketone: x  / Bili: x / Urobili: x   Blood: x / Protein: x / Nitrite: x   Leuk Esterase: x / RBC: x / WBC x   Sq Epi: x / Non Sq Epi: x / Bacteria: x          IMAGING:    [X] All pertinent imaging reviewed by me SUBJECTIVE / OVERNIGHT EVENTS:  Pt seen and examined at bedside. Dilaudid PRN added for severe pain/pain associated with turning yesterday; lactulose PRN also added and bowel regimen up-titrated to Miralax BID + suppository 1x given yesterday -> 2 large BM since yesterday.  GLENNA Continues to feel depressed about his functional status and frustrated about not understanding the etiology behind his DEEDEE. Informed about his HgB: 6.9, and he may receive pRBC if repeat CBC @noon is consistent - blood consent form signed at bedside.    MEDICATIONS  (STANDING):  apixaban 5 milliGRAM(s) Oral two times a day  ferrous    sulfate 325 milliGRAM(s) Oral daily  hydrALAZINE 25 milliGRAM(s) Oral every 8 hours  metoprolol succinate  milliGRAM(s) Oral daily  Nephro-shannon 1 Tablet(s) Oral daily  polyethylene glycol 3350 17 Gram(s) Oral two times a day  senna 2 Tablet(s) Oral at bedtime  sevelamer carbonate 800 milliGRAM(s) Oral two times a day  sodium bicarbonate 650 milliGRAM(s) Oral three times a day  sodium chloride 0.45%. 1000 milliLiter(s) (75 mL/Hr) IV Continuous <Continuous>    MEDICATIONS  (PRN):  acetaminophen     Tablet .. 650 milliGRAM(s) Oral every 6 hours PRN Temp greater or equal to 38C (100.4F), Mild Pain (1 - 3)  HYDROmorphone   Tablet 2 milliGRAM(s) Oral every 6 hours PRN Turning in bed/cleaning and/or SEVERE pain  lactulose Syrup 10 Gram(s) Oral once PRN constipation  lidocaine   4% Patch 1 Patch Transdermal daily PRN L knee pain  methocarbamol 750 milliGRAM(s) Oral every 12 hours PRN Muscle Spasm  traMADol 50 milliGRAM(s) Oral every 12 hours PRN Moderate Pain (4 - 6)        05-03-25 @ 07:01  -  05-04-25 @ 07:00  --------------------------------------------------------  IN: 360 mL / OUT: 4500 mL / NET: -4140 mL        PHYSICAL EXAM:  Vital Signs Last 24 Hrs  T(C): 37 (04 May 2025 04:53), Max: 37.7 (03 May 2025 11:52)  T(F): 98.6 (04 May 2025 04:53), Max: 99.8 (03 May 2025 11:52)  HR: 109 (04 May 2025 04:53) (107 - 116)  BP: 137/89 (04 May 2025 04:53) (137/89 - 151/102)  BP(mean): --  RR: 18 (04 May 2025 04:53) (18 - 18)  SpO2: 97% (04 May 2025 04:53) (97% - 99%)    Parameters below as of 04 May 2025 04:53  Patient On (Oxygen Delivery Method): room air        CAPILLARY BLOOD GLUCOSE        I&O's Summary    03 May 2025 07:01  -  04 May 2025 07:00  --------------------------------------------------------  IN: 360 mL / OUT: 4500 mL / NET: -4140 mL      CONSTITUTIONAL: NAD  HEENT: NC/AT  RESPIRATORY: Normal respiratory effort; lungs are clear to auscultation bilaterally  CARDIOVASCULAR: Regular rate and rhythm, normal S1 and S2, no murmur/rub/gallop; No lower extremity edema; Peripheral pulses are 2+ bilaterally  ABDOMEN: Nontender to palpation, normoactive bowel sounds, no rebound/guarding; No hepatosplenomegaly  MUSCLOSKELETAL: no clubbing or cyanosis of digits; no joint swelling or tenderness to palpation  EXTREMITIES: trace edema, distal pulses intact, LLE ttp both anterior/posterior.   NEURO: no focal deficits   PSYCH: A+O to person, place, and time; endorsing anxiety and depression       LABS:                        7.2    8.98  )-----------( 190      ( 03 May 2025 06:51 )             23.8     05-03    140  |  106  |  135[H]  ----------------------------<  89  4.6   |  16[L]  |  5.49[H]    Ca    8.6      03 May 2025 06:59  Phos  5.4     05-03  Mg     2.2     05-03            Urinalysis Basic - ( 03 May 2025 06:59 )    Color: x / Appearance: x / SG: x / pH: x  Gluc: 89 mg/dL / Ketone: x  / Bili: x / Urobili: x   Blood: x / Protein: x / Nitrite: x   Leuk Esterase: x / RBC: x / WBC x   Sq Epi: x / Non Sq Epi: x / Bacteria: x          IMAGING:    [X] All pertinent imaging reviewed by me

## 2025-05-04 NOTE — DISCHARGE NOTE PROVIDER - NSDCFUSCHEDAPPT_GEN_ALL_CORE_FT
[facility]  ORTHOSURG [address] [facility]  Scheduled Appointment: 05/20/2025

## 2025-05-04 NOTE — DISCHARGE NOTE PROVIDER - NSDCFUADDAPPT_GEN_ALL_CORE_FT
APPTS ARE READY TO BE MADE: [ ] YES    Best Family or Patient Contact (if needed):    Additional Information about above appointments (if needed):    1: Primary Care  2: Nephrology   3:     Other comments or requests:    APPTS ARE READY TO BE MADE: [ ] YES    Best Family or Patient Contact (if needed):    Additional Information about above appointments (if needed):    1: Primary Care  2: Nephrology   3: Urology    Other comments or requests:    APPTS ARE READY TO BE MADE: [X] YES    Best Family or Patient Contact (if needed):    Additional Information about above appointments (if needed):    1: Primary Care 1 week  2: Nephrology 1 week  3: Urology 1-2 weeks  4: Orthopedic Surgery 1-2 weeks    Other comments or requests:    APPTS ARE READY TO BE MADE: [X] YES    Best Family or Patient Contact (if needed):    Additional Information about above appointments (if needed):    1: Primary Care 1 week  2: Nephrology 1 week  3: Urology 1-2 weeks  4: Orthopedic Surgery 1-2 weeks    Other comments or requests:   Patient is being transferred. Caregiver will arrange follow up.

## 2025-05-04 NOTE — PROGRESS NOTE ADULT - PROBLEM SELECTOR PLAN 4
- Recent hospitalization after mechanical fall.  - Chronic L knee pain due to severe OA  - Home pain as-needed pain meds include: Acetaminophen; Tramadol 50mg Q8H for pain (4-6); methocarbamol 750mg BID; lidocaine patch.    > c/w PRN Lidocaine patch  > PO pain regimen as needed: Tylenol 650mg Q6H for mild pain PRN, Tramadol 50mg BID for moderate pain PRN (renally-adjusted); methocarbamol 750mg BID for muscle spasm PRN. Dilaudid 2mg q6 PRN for severe pain and dressing changes (started 05/03)  > c/w Fall precaution  > c/w Wound care recs  > f/u PT eval --> likely back to [facility]   > f/u Nutrition consult

## 2025-05-04 NOTE — PROGRESS NOTE ADULT - SUBJECTIVE AND OBJECTIVE BOX
NEPHROLOGY     Patient seen and examined resting comfortably on room air, reports feeling ok, denies pain, no sob, in no acute distress.     MEDICATIONS  (STANDING):  apixaban 5 milliGRAM(s) Oral two times a day  ferrous    sulfate 325 milliGRAM(s) Oral daily  hydrALAZINE 25 milliGRAM(s) Oral every 8 hours  metoprolol succinate  milliGRAM(s) Oral daily  Nephro-shannon 1 Tablet(s) Oral daily  polyethylene glycol 3350 17 Gram(s) Oral two times a day  senna 2 Tablet(s) Oral at bedtime  sevelamer carbonate 800 milliGRAM(s) Oral two times a day  sodium bicarbonate 975 milliGRAM(s) Oral three times a day  sodium chloride 0.45%. 1000 milliLiter(s) (100 mL/Hr) IV Continuous <Continuous>  sodium chloride 0.45%. 1000 milliLiter(s) (75 mL/Hr) IV Continuous <Continuous>    VITALS:  T(C): , Max: 37.2 (05-03-25 @ 20:20)  T(F): , Max: 98.9 (05-03-25 @ 20:20)  HR: 109 (05-04-25 @ 04:53)  BP: 137/89 (05-04-25 @ 04:53)  BP(mean): --  RR: 18 (05-04-25 @ 04:53)  SpO2: 97% (05-04-25 @ 04:53)  Wt(kg): --  I and O's:    05-03 @ 07:01  -  05-04 @ 07:00  --------------------------------------------------------  IN: 360 mL / OUT: 4500 mL / NET: -4140 mL    PHYSICAL EXAM:  Constitutional: NAD, Alert  HEENT: NCAT, MMM  Neck: Supple, No JVD  Respiratory: CTA-b/l  Cardiovascular: tachy reg s1s2  Gastrointestinal: BS+, soft, NT/ND  : (+)maldonado  Extremities: No peripheral edema b/l  Neurological: reduced generalized strength  Back: no CVAT b/l  Skin: No rashes, no nevi    LABS:                        7.1    8.46  )-----------( 169      ( 04 May 2025 11:22 )             22.7     05-04    140  |  106  |  106[H]  ----------------------------<  86  4.2   |  17[L]  |  5.36[H]    Ca    8.6      04 May 2025 06:55  Phos  5.6     05-04  Mg     2.0     05-04    TPro  6.2  /  Alb  3.0[L]  /  TBili  0.6  /  DBili  x   /  AST  9[L]  /  ALT  9[L]  /  AlkPhos  77  05-04

## 2025-05-04 NOTE — PROGRESS NOTE ADULT - ASSESSMENT
65 YO M w/ PMH of DM2 (on Januvia), HTN, HLD, gout, A-fib (on Eliquis), CKD not on dialysis (last documented Cr 2.82 on 4/5/25), severe OA, prostate cancer (s/p radiation), recent admission for fall discharged to [facility], admitted for elevated sCr s/p 2.7L removal on initial straight catheterization - likely post-renal DEEDEE.

## 2025-05-04 NOTE — DISCHARGE NOTE PROVIDER - HOSPITAL COURSE
HPI:  65 YO M w/ PMH of DM2 (on Januvia), HTN, HLD, gout, A-fib on Eliquis, CKD not on dialysis (last documented Cr 2.82 on 4/5/25), severe OA, prostate cancer (s/p radiation), recent admission for fall discharged to [facility] rehab, brought in by EMS from [facility] for abnormal lab results (Cr 5.98).    Pt was hospitalized last month for a fall. During the prior hospitalization, also found worsened Cr, but unclear whether it was progressive CKD or DEEDEE on CKD. Pt had been in [facility] rehab since prior discharge. Today, found to have elevated BUN/Cr 130/5.98, Hgb 7.1 (baseline ~10) at [facility]. Pt endorses polyuria and L knee pain, which are his chronic conditions. Also reports pain in buttocks, unchanged from prior. Denies other symptoms, including melena/hematochezia, chest pain, dyspnea, lightheadedness, dizziness, neurological symptoms, abdominal pain, back pain, nausea, vomiting, diarrhea or constipation. (02 May 2025 01:44)    Hospital Course:  The patient was admitted with worsening DEEDEE on CKD, with a sCr of >5.5. A straight catheterization and subsequent maldonado placement had an immediately 2.7L output - thus the likely presumed etiology of the DEEDEE was attributed to post-renal DEEDEE. The patient received IVF replenishment due to the large volume loss, sevelamer for hyperphosphatemia, and sodium bicarbonate tablets for metabolic acidosis. The patient's sCr slowly down-trended ****    Important Medication Changes and Reason:    Active or Pending Issues Requiring Follow-up:  Primary Care Follow-Up  Nephrology Follow-Up    Advanced Directives:   [X] Full code  [ ] DNR  [ ] Hospice    Discharge Diagnoses:  DEEDEE on CKD  Hyperphosphatemia  Post-Renal Azotemia  Urinary Retention   Metabolic Acidosis  Constipation   Complex Renal Cysts        HPI:  65 YO M w/ PMH of DM2 (on Januvia), HTN, HLD, gout, A-fib on Eliquis, CKD not on dialysis (last documented Cr 2.82 on 4/5/25), severe OA, prostate cancer (s/p radiation), recent admission for fall discharged to [facility] rehab, brought in by EMS from [facility] for abnormal lab results (Cr 5.98).    Pt was hospitalized last month for a fall. During the prior hospitalization, also found worsened Cr, but unclear whether it was progressive CKD or DEEDEE on CKD. Pt had been in [facility] rehab since prior discharge. Today, found to have elevated BUN/Cr 130/5.98, Hgb 7.1 (baseline ~10) at [facility]. Pt endorses polyuria and L knee pain, which are his chronic conditions. Also reports pain in buttocks, unchanged from prior. Denies other symptoms, including melena/hematochezia, chest pain, dyspnea, lightheadedness, dizziness, neurological symptoms, abdominal pain, back pain, nausea, vomiting, diarrhea or constipation. (02 May 2025 01:44)    Hospital Course:  The patient was admitted with worsening DEEDEE on CKD, with a sCr of >5.5. A straight catheterization and subsequent maldonado placement had an immediately 2.7L output - thus the likely presumed etiology of the DEEDEE was attributed to post-renal DEEDEE. The patient received IVF replenishment due to the large volume loss, sevelamer for hyperphosphatemia, and sodium bicarbonate tablets for metabolic acidosis. Imaging revealed renal cysts, for which he will need followup imaging as an outpatient. The patient's sCr slowly down-trended, with low of 4.29, evaluated by nephrology while inpatient. His course was complicated by UTI - urine culture grew pan-sensitive E. coli, treated w/ 5d aztreonam and 2d ceftriaxone, evaluated by infectious disease.    Underwent TOV on 5/12. Started on Flomax. Mirabegron stopped as likely contributing to urinary retention.    Important Medication Changes and Reason:  Start Flomax  DC Mirabegron    Active or Pending Issues Requiring Follow-up:  Primary Care Follow-Up  Nephrology Follow-Up  Urology followup    Advanced Directives:   [X] Full code  [ ] DNR  [ ] Hospice    Discharge Diagnoses:  DEEDEE on CKD  Hyperphosphatemia  Post-Renal Azotemia  Urinary Retention   Metabolic Acidosis  Constipation   Complex Renal Cysts   UTI       HPI:  63 YO M w/ PMH of DM2 (on Januvia), HTN, HLD, gout, A-fib on Eliquis, CKD not on dialysis (last documented Cr 2.82 on 4/5/25), severe OA, prostate cancer (s/p radiation), recent admission for fall discharged to [facility] rehab, brought in by EMS from [facility] for abnormal lab results (Cr 5.98).    Pt was hospitalized last month for a fall. During the prior hospitalization, also found worsened Cr, but unclear whether it was progressive CKD or DEEDEE on CKD. Pt had been in [facility] rehab since prior discharge. Today, found to have elevated BUN/Cr 130/5.98, Hgb 7.1 (baseline ~10) at [facility]. Pt endorses polyuria and L knee pain, which are his chronic conditions. Also reports pain in buttocks, unchanged from prior. Denies other symptoms, including melena/hematochezia, chest pain, dyspnea, lightheadedness, dizziness, neurological symptoms, abdominal pain, back pain, nausea, vomiting, diarrhea or constipation. (02 May 2025 01:44)    Hospital Course:  The patient was admitted with worsening DEEDEE on CKD, with a sCr of >5.5. A straight catheterization and subsequent maldonado placement had an immediately 2.7L output - thus the likely presumed etiology of the DEEDEE was attributed to post-renal DEEDEE. The patient received IVF replenishment due to the large volume loss and post-obstructive diuresis, sevelamer for hyperphosphatemia, and sodium bicarbonate tablets for metabolic acidosis. Sevelamer was discontinued as phos normalized. Imaging revealed renal cysts, for which he will need followup imaging as an outpatient. The patient's sCr slowly down-trended, 4.09 on day of discharge, evaluated by nephrology while inpatient and cleared for discharge back to rehab. His course was complicated by UTI - urine culture grew pan-sensitive E. coli, treated w/ 5d aztreonam and 2d ceftriaxone, evaluated by infectious disease.    Underwent TOV on 5/13, indwelling Maldonado replaced as patient retaining urine x3. Started on Flomax. Mirabegron stopped as likely contributing to urinary retention. For knee pain pt prescribed Gabapentin 100mg qd, Robaxin 750mg BID PRN, Tylenol prn, and Dilaudid 2mg PO q6 PRN. Continued on Eliquis, Toprol, hydralazine. a1c 4.6 this admission, initially monitored on insulin sliding scale, blood glucose well controlled so insulin discontinued. Pt was also evaluated by orthopedic surgery - no role for inpatient knee replacement but should follow up as an outpatient for surgical planning.    The patient is afebrile, hemodynamically stable and medically optimized for discharge to [facility] with PCP, nephrology, and urology followup, as well as orthopedic surgery followup.  On day of discharge, patient is clinically stable with no new exam findings or acute symptoms compared to prior. The patient was seen by the attending physician on the date of discharge and deemed stable and acceptable for discharge. The patient's chronic medical conditions were treated accordingly per the patient's home medication regimen. The patient's medication reconciliation (with changes made to chronic medications), follow up appointments, discharge orders, instructions, and significant lab and diagnostic studies are as noted.      Important Medication Changes and Reason:  Start Flomax  Bicarb tablets  For pain - Dilaudid 2mg PO q6 PRN, Gabapentin 100mg qd, Robaxin 750mg BID PRN, Tylenol PRN  Discontinue Mirabegron  Discontinue Januvia    Active or Pending Issues Requiring Follow-up:  Primary Care Follow-Up  Nephrology Follow-Up, Follow up MRI for renal cysts  Urology followup for urinary retention  Orthopedic surgery followup to discuss planning for knee replacement     Advanced Directives:   [X] Full code  [ ] DNR  [ ] Hospice    Discharge Diagnoses:  DEEDEE on CKD  Hyperphosphatemia  Post-Renal Azotemia  Urinary Retention   Metabolic Acidosis  Constipation   Complex Renal Cysts   Catheter associated UTI

## 2025-05-04 NOTE — PROGRESS NOTE ADULT - PROBLEM SELECTOR PLAN 9
-DVT Ppx: c/w Eliquis  -Diet: CC/nephro diet  -Fall Precautions  -Dispo: Pending resolution of DEEDEE  -Wound Care d/c recs: "Pt will need Group 2 mattress on hospital bed and ROHO cushion for wheel chair upon discharge home"  -PT eval: likely back to [facility]

## 2025-05-04 NOTE — DISCHARGE NOTE PROVIDER - NSFOLLOWUPCLINICS_GEN_ALL_ED_FT
[facility] - Urology  Urology  [address], 3rd & [address]th [place] [address]  Phone: [phone]  Fax:   Follow Up Time: 2 weeks     [facility] - Urology  Urology  [address], 3rd & 4th [place] [address]  Phone: [phone]  Fax:   Follow Up Time: 2 weeks    [facility] Orthopedic Surgery  Orthopedic Surgery  [address], 3rd & 4th [place] [address]  Phone: [phone]  Fax:   Follow Up Time: 1 week

## 2025-05-04 NOTE — PROGRESS NOTE ADULT - ASSESSMENT
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  (2)DEEDEE - obstructive - numbers continue to improve, s/p maldonado placement upon admission...and hopefully dialysis can be avoided here  (3)Metabolic acidosis - renally mediated - now on NaHCO3 975mg po tid  (4)CV - hypertension - on Hydralazine/Lopressor  (5)Hyperphosphatemia - due to DEEDEE, mild     RECOMMEND:  (1)Strict I/Os; a/w 1/2NS@100 cc/h to minimize risk of hypovolemia-associated complications from post-obstructive diuresis  (2)A/w PO NaHCO3 as ordered  (3)Antihypertensives as ordered  (4)A/w Renvela 800 mg po bid  (5)Dose new meds for GFR <15ml/min (present dosing is acceptable)  (6)BMP+Mg+PO4 daily  (6)No HD for now    Marianela Grigsby, JOE  [facility]  [phone]

## 2025-05-05 DIAGNOSIS — R50.9 FEVER, UNSPECIFIED: ICD-10-CM

## 2025-05-05 LAB
ANION GAP SERPL CALC-SCNC: 16 MMOL/L — SIGNIFICANT CHANGE UP (ref 5–17)
APPEARANCE UR: ABNORMAL
BACTERIA # UR AUTO: ABNORMAL /HPF
BASOPHILS # BLD AUTO: 0.02 K/UL — SIGNIFICANT CHANGE UP (ref 0–0.2)
BASOPHILS NFR BLD AUTO: 0.2 % — SIGNIFICANT CHANGE UP (ref 0–2)
BILIRUB UR-MCNC: NEGATIVE — SIGNIFICANT CHANGE UP
BUN SERPL-MCNC: 103 MG/DL — HIGH (ref 7–23)
CALCIUM SERPL-MCNC: 8.4 MG/DL — SIGNIFICANT CHANGE UP (ref 8.4–10.5)
CAST: 2 /LPF — SIGNIFICANT CHANGE UP (ref 0–4)
CHLORIDE SERPL-SCNC: 107 MMOL/L — SIGNIFICANT CHANGE UP (ref 96–108)
CO2 SERPL-SCNC: 17 MMOL/L — LOW (ref 22–31)
COLOR SPEC: ABNORMAL
CREAT SERPL-MCNC: 5.12 MG/DL — HIGH (ref 0.5–1.3)
DIFF PNL FLD: ABNORMAL
EGFR: 12 ML/MIN/1.73M2 — LOW
EGFR: 12 ML/MIN/1.73M2 — LOW
EOSINOPHIL # BLD AUTO: 0.11 K/UL — SIGNIFICANT CHANGE UP (ref 0–0.5)
EOSINOPHIL NFR BLD AUTO: 1.1 % — SIGNIFICANT CHANGE UP (ref 0–6)
FLUAV AG NPH QL: SIGNIFICANT CHANGE UP
FLUBV AG NPH QL: SIGNIFICANT CHANGE UP
GAS PNL BLDV: SIGNIFICANT CHANGE UP
GLUCOSE SERPL-MCNC: 102 MG/DL — HIGH (ref 70–99)
GLUCOSE UR QL: NEGATIVE MG/DL — SIGNIFICANT CHANGE UP
HCT VFR BLD CALC: 22.3 % — LOW (ref 39–50)
HGB BLD-MCNC: 7 G/DL — CRITICAL LOW (ref 13–17)
IMM GRANULOCYTES NFR BLD AUTO: 0.8 % — SIGNIFICANT CHANGE UP (ref 0–0.9)
KETONES UR-MCNC: NEGATIVE MG/DL — SIGNIFICANT CHANGE UP
LEUKOCYTE ESTERASE UR-ACNC: ABNORMAL
LYMPHOCYTES # BLD AUTO: 0.23 K/UL — LOW (ref 1–3.3)
LYMPHOCYTES # BLD AUTO: 2.3 % — LOW (ref 13–44)
MAGNESIUM SERPL-MCNC: 1.8 MG/DL — SIGNIFICANT CHANGE UP (ref 1.6–2.6)
MCHC RBC-ENTMCNC: 29.5 PG — SIGNIFICANT CHANGE UP (ref 27–34)
MCHC RBC-ENTMCNC: 31.4 G/DL — LOW (ref 32–36)
MCV RBC AUTO: 94.1 FL — SIGNIFICANT CHANGE UP (ref 80–100)
MONOCYTES # BLD AUTO: 1.13 K/UL — HIGH (ref 0–0.9)
MONOCYTES NFR BLD AUTO: 11.2 % — SIGNIFICANT CHANGE UP (ref 2–14)
NEUTROPHILS # BLD AUTO: 8.52 K/UL — HIGH (ref 1.8–7.4)
NEUTROPHILS NFR BLD AUTO: 84.4 % — HIGH (ref 43–77)
NITRITE UR-MCNC: POSITIVE
NRBC BLD AUTO-RTO: 0 /100 WBCS — SIGNIFICANT CHANGE UP (ref 0–0)
PH UR: 5.5 — SIGNIFICANT CHANGE UP (ref 5–8)
PHOSPHATE SERPL-MCNC: 5.2 MG/DL — HIGH (ref 2.5–4.5)
PLATELET # BLD AUTO: 177 K/UL — SIGNIFICANT CHANGE UP (ref 150–400)
POTASSIUM SERPL-MCNC: 3.9 MMOL/L — SIGNIFICANT CHANGE UP (ref 3.5–5.3)
POTASSIUM SERPL-SCNC: 3.9 MMOL/L — SIGNIFICANT CHANGE UP (ref 3.5–5.3)
PROT UR-MCNC: 100 MG/DL
RBC # BLD: 2.37 M/UL — LOW (ref 4.2–5.8)
RBC # FLD: 13.7 % — SIGNIFICANT CHANGE UP (ref 10.3–14.5)
RBC CASTS # UR COMP ASSIST: 314 /HPF — HIGH (ref 0–4)
REVIEW: SIGNIFICANT CHANGE UP
RSV RNA NPH QL NAA+NON-PROBE: SIGNIFICANT CHANGE UP
SARS-COV-2 RNA SPEC QL NAA+PROBE: SIGNIFICANT CHANGE UP
SODIUM SERPL-SCNC: 140 MMOL/L — SIGNIFICANT CHANGE UP (ref 135–145)
SOURCE RESPIRATORY: SIGNIFICANT CHANGE UP
SP GR SPEC: 1.01 — SIGNIFICANT CHANGE UP (ref 1–1.03)
SQUAMOUS # UR AUTO: 3 /HPF — SIGNIFICANT CHANGE UP (ref 0–5)
UROBILINOGEN FLD QL: 0.2 MG/DL — SIGNIFICANT CHANGE UP (ref 0.2–1)
WBC # BLD: 10.09 K/UL — SIGNIFICANT CHANGE UP (ref 3.8–10.5)
WBC # FLD AUTO: 10.09 K/UL — SIGNIFICANT CHANGE UP (ref 3.8–10.5)
WBC UR QL: >998 /HPF — HIGH (ref 0–5)

## 2025-05-05 PROCEDURE — 99233 SBSQ HOSP IP/OBS HIGH 50: CPT | Mod: GC

## 2025-05-05 PROCEDURE — 71045 X-RAY EXAM CHEST 1 VIEW: CPT | Mod: 26

## 2025-05-05 RX ORDER — VANCOMYCIN HCL IN 5 % DEXTROSE 1.5G/250ML
750 PLASTIC BAG, INJECTION (ML) INTRAVENOUS ONCE
Refills: 0 | Status: COMPLETED | OUTPATIENT
Start: 2025-05-05 | End: 2025-05-05

## 2025-05-05 RX ORDER — SODIUM CHLORIDE 9 G/1000ML
1000 INJECTION, SOLUTION INTRAVENOUS
Refills: 0 | Status: DISCONTINUED | OUTPATIENT
Start: 2025-05-05 | End: 2025-05-06

## 2025-05-05 RX ORDER — ACETAMINOPHEN 500 MG/5ML
1000 LIQUID (ML) ORAL ONCE
Refills: 0 | Status: DISCONTINUED | OUTPATIENT
Start: 2025-05-05 | End: 2025-05-05

## 2025-05-05 RX ORDER — DICLOFENAC SODIUM 10 MG/G
4 GEL TOPICAL
Refills: 0 | Status: DISCONTINUED | OUTPATIENT
Start: 2025-05-05 | End: 2025-05-06

## 2025-05-05 RX ORDER — EPOETIN ALFA 10000 [IU]/ML
10000 SOLUTION INTRAVENOUS; SUBCUTANEOUS ONCE
Refills: 0 | Status: COMPLETED | OUTPATIENT
Start: 2025-05-05 | End: 2025-05-05

## 2025-05-05 RX ORDER — SODIUM BICARBONATE 1 MEQ/ML
1300 SYRINGE (ML) INTRAVENOUS THREE TIMES A DAY
Refills: 0 | Status: DISCONTINUED | OUTPATIENT
Start: 2025-05-05 | End: 2025-05-12

## 2025-05-05 RX ORDER — HYDROMORPHONE/SOD CHLOR,ISO/PF 2 MG/10 ML
0.5 SYRINGE (ML) INJECTION ONCE
Refills: 0 | Status: DISCONTINUED | OUTPATIENT
Start: 2025-05-05 | End: 2025-05-05

## 2025-05-05 RX ADMIN — Medication 650 MILLIGRAM(S): at 20:25

## 2025-05-05 RX ADMIN — Medication 25 MILLIGRAM(S): at 05:29

## 2025-05-05 RX ADMIN — DICLOFENAC SODIUM 4 GRAM(S): 10 GEL TOPICAL at 17:37

## 2025-05-05 RX ADMIN — TRAMADOL HYDROCHLORIDE 50 MILLIGRAM(S): 50 TABLET, FILM COATED ORAL at 14:12

## 2025-05-05 RX ADMIN — DICLOFENAC SODIUM 4 GRAM(S): 10 GEL TOPICAL at 14:40

## 2025-05-05 RX ADMIN — SEVELAMER HYDROCHLORIDE 800 MILLIGRAM(S): 800 TABLET ORAL at 05:29

## 2025-05-05 RX ADMIN — SODIUM CHLORIDE 100 MILLILITER(S): 9 INJECTION, SOLUTION INTRAVENOUS at 05:28

## 2025-05-05 RX ADMIN — SEVELAMER HYDROCHLORIDE 800 MILLIGRAM(S): 800 TABLET ORAL at 17:36

## 2025-05-05 RX ADMIN — Medication 650 MILLIGRAM(S): at 21:35

## 2025-05-05 RX ADMIN — EPOETIN ALFA 10000 UNIT(S): 10000 SOLUTION INTRAVENOUS; SUBCUTANEOUS at 11:40

## 2025-05-05 RX ADMIN — Medication 25 MILLIGRAM(S): at 21:26

## 2025-05-05 RX ADMIN — Medication 975 MILLIGRAM(S): at 05:28

## 2025-05-05 RX ADMIN — Medication 250 MILLIGRAM(S): at 18:12

## 2025-05-05 RX ADMIN — Medication 1 TABLET(S): at 11:39

## 2025-05-05 RX ADMIN — APIXABAN 5 MILLIGRAM(S): 2.5 TABLET, FILM COATED ORAL at 17:37

## 2025-05-05 RX ADMIN — Medication 2 MILLIGRAM(S): at 00:16

## 2025-05-05 RX ADMIN — METOPROLOL SUCCINATE 100 MILLIGRAM(S): 50 TABLET, EXTENDED RELEASE ORAL at 05:29

## 2025-05-05 RX ADMIN — APIXABAN 5 MILLIGRAM(S): 2.5 TABLET, FILM COATED ORAL at 05:29

## 2025-05-05 RX ADMIN — DICLOFENAC SODIUM 4 GRAM(S): 10 GEL TOPICAL at 23:18

## 2025-05-05 RX ADMIN — TRAMADOL HYDROCHLORIDE 50 MILLIGRAM(S): 50 TABLET, FILM COATED ORAL at 14:42

## 2025-05-05 RX ADMIN — Medication 650 MILLIGRAM(S): at 05:59

## 2025-05-05 RX ADMIN — Medication 1300 MILLIGRAM(S): at 21:26

## 2025-05-05 RX ADMIN — Medication 25 MILLIGRAM(S): at 14:13

## 2025-05-05 RX ADMIN — Medication 1300 MILLIGRAM(S): at 17:37

## 2025-05-05 RX ADMIN — Medication 325 MILLIGRAM(S): at 11:39

## 2025-05-05 RX ADMIN — Medication 2 MILLIGRAM(S): at 23:17

## 2025-05-05 NOTE — PROGRESS NOTE ADULT - PROBLEM SELECTOR PLAN 2
- 4/25/25 Renal US: multiple bilateral renal cysts, which are increased in size and number compared to the prior CT scan - c/f neoplasm  - 05/02/25 CTAP w/o contrast: Limited exam without IV contrast unable to diagnostically evaluate the bilateral renal lesions of varying size and complexity. Recommend renal protocol MRI with IV contrast if clinically desired    > Plan for renal protocol MRI with IV contrast when kidney fx improves Febrile overnight 5/4, TMax 101.2. No localizing infectious symptoms    - F/u UA, RVP, blood cultures, urinalysis, CXR  - Abx pending workup above

## 2025-05-05 NOTE — PROGRESS NOTE ADULT - PROBLEM SELECTOR PLAN 3
- Hgb trend: 10.7 (4/5) -> 8.6 (4/14) -> 8.5 (05/01)  - Pt denies melena or hematochezia  - Home ferrous sulfate 325 mg (65mg iron) daily  - Iron studies c/w AOCD  - Reticulocyte index: 0.5, likely 2/2 CKD   - Possibly progressive ACD d/t CKD.    > Repeat CBC @Noon today -> transfuse 1U pRBC if HgB <7  > c/w trending HgB  > Maintain active T&S (last 05/04)  > c/w home ferrous sulfate 325mg PO QD - 4/25/25 Renal US: multiple bilateral renal cysts, which are increased in size and number compared to the prior CT scan - c/f neoplasm  - 05/02/25 CTAP w/o contrast: Limited exam without IV contrast unable to diagnostically evaluate the bilateral renal lesions of varying size and complexity. Recommend renal protocol MRI with IV contrast if clinically desired    > Plan for renal protocol MRI with IV contrast when kidney fx improves

## 2025-05-05 NOTE — PROGRESS NOTE ADULT - PROBLEM SELECTOR PLAN 1
- Per nephro note in prior hospitalization: unclear if DEEDEE on CKD vs slowly progressive CKD.   - 5/1/25 Cr 5.85 at admission. (In prior hospitalization, Cr 3.9 on admission. Per Dr. Woo, baseline Cr is 2.5)  - Nephrology consulted - Dr. Hilton May (Nephrologist at [facility]): would ideally like to defer HD for now  - Was previously followed by nephrologist (Dr Mauricio Cortes) but hasn't seen for last couple of years.  - 4/25/25 Renal US: multiple bilateral renal cysts, which are increased in size and number compared to the prior CT scan - c/f neoplasm  - Urine Lytes (05/02): FeNa 4.2% suggestive of intrinsic disease  - s/p maldonado catheter placement in ER with 2.7L fluid removed  - Ddx: Likely DEEDEE on CKD 2/2 post-renal DEEDEE s/p 2.7L fluid removed on maldonado catheter; now possibly also with component of pre-renal DEEDEE due to large volume UOP after maldonado placement, which may explain only slight decrease in Cr. A more notable improvement in Cr would have been expected after maldonado placement for a true post-renal DEEDEE    > c/w indwelling maldonado (05/01-  > daily BMP/Mg/Phos  > c/w bicarb 975mg PO TID (up-titrated 05/03)   > C/w Sevelamer 800 mg BID; d/c once phos normalizes   > c/w strict intake/output Q6H  > c/w NS 0.45% at 100 cc/hr if UOP continues to remain >125 cc/hr to minimize risk of hypovolemia-associated complications from post-obstructive complications (up-titrated 75cc->100cc on 05/04)  > f/u Nephrology recommendations - Per nephro note in prior hospitalization: unclear if DEEDEE on CKD vs slowly progressive CKD.   - 5/1/25 Cr 5.85 at admission. (In prior hospitalization, Cr 3.9 on admission. Per Dr. Woo, baseline Cr is 2.5)  - Nephrology consulted - Dr. Hilton May (Nephrologist at [facility]): would ideally like to defer HD for now  - Was previously followed by nephrologist (Dr Mauricio Cortes) but hasn't seen for last couple of years.  - 4/25/25 Renal US: multiple bilateral renal cysts, which are increased in size and number compared to the prior CT scan - c/f neoplasm  - Urine Lytes (05/02): FeNa 4.2% suggestive of intrinsic disease  - s/p maldonado catheter placement in ER with 2.7L fluid removed  - Ddx: Likely DEEDEE on CKD 2/2 post-renal DEEDEE s/p 2.7L fluid removed on maldonado catheter; now possibly also with component of pre-renal DEEDEE due to large volume UOP after maldonado placement, which may explain only slight decrease in Cr. A more notable improvement in Cr would have been expected after maldonado placement for a true post-renal DEEDEE    > c/w indwelling maldonado (05/01-  > daily BMP/Mg/Phos  > increase bicarb to 1300mg PO TID (up-titrated 05/05)  > C/w Sevelamer 800 mg BID; d/c once phos normalizes   > c/w strict intake/output Q6H  > c/w NS 0.45% at 100 cc/hr if UOP continues to remain >125 cc/hr to minimize risk of hypovolemia-associated complications from post-obstructive complications (up-titrated 75cc->100cc on 05/04)  > f/u Nephrology recommendations

## 2025-05-05 NOTE — PROGRESS NOTE ADULT - PROBLEM SELECTOR PLAN 4
- Recent hospitalization after mechanical fall.  - Chronic L knee pain due to severe OA  - Home pain as-needed pain meds include: Acetaminophen; Tramadol 50mg Q8H for pain (4-6); methocarbamol 750mg BID; lidocaine patch.    > c/w PRN Lidocaine patch  > PO pain regimen as needed: Tylenol 650mg Q6H for mild pain PRN, Tramadol 50mg BID for moderate pain PRN (renally-adjusted); methocarbamol 750mg BID for muscle spasm PRN. Dilaudid 2mg q6 PRN for severe pain and dressing changes (started 05/03)  > c/w Fall precaution  > c/w Wound care recs  > f/u PT eval --> likely back to [facility]   > f/u Nutrition consult - Hgb trend: 10.7 (4/5) -> 8.6 (4/14) -> 8.5 (05/01)  - Pt denies melena or hematochezia  - Home ferrous sulfate 325 mg (65mg iron) daily  - Iron studies c/w AOCD  - Reticulocyte index: 0.5, likely 2/2 CKD   - Possibly progressive ACD d/t CKD.    > Hgb = 7 5/5, 1/2u pRBC x2 ordered, Retacrit 10k SQ x1 ordered   > Trend CBC  > Maintain active T&S (last 05/04)  > c/w home ferrous sulfate 325mg PO QD

## 2025-05-05 NOTE — PROGRESS NOTE ADULT - PROBLEM SELECTOR PLAN 9
-DVT Ppx: c/w Eliquis  -Diet: CC/nephro diet  -Fall Precautions  -Dispo: Pending resolution of DEEDEE  -Wound Care d/c recs: "Pt will need Group 2 mattress on hospital bed and ROHO cushion for wheel chair upon discharge home"  -PT eval: likely back to [facility] - Prostate CA s/p radiation; unclear if this is contributing to urinary obstruction and retention   - Home Myrbetriq 50mg daily    > f/u Outpatient  - Holding Myrebetiq for now

## 2025-05-05 NOTE — PROGRESS NOTE ADULT - PROBLEM SELECTOR PLAN 5
- A1c: 4.6 4/3/2025  - Home Januvia 50 mg daily    > c/w ISS  > c/w POCT Blood Glucose pre-meal and at bedtime  > Hold home Januvia - A1c: 4.6 4/3/2025  - Home Januvia 50 mg daily    > c/w ISS  > c/w POCT Blood Glucose pre-meal and at bedtime.  > Hold home Januvia - Recent hospitalization after mechanical fall.  - Chronic L knee pain due to severe OA  - Home pain as-needed pain meds include: Acetaminophen; Tramadol 50mg Q8H for pain (4-6); methocarbamol 750mg BID; lidocaine patch.    > c/w PRN Lidocaine patch  > PO pain regimen as needed: Tylenol 650mg Q6H for mild pain PRN, Tramadol 50mg BID for moderate pain PRN (renally-adjusted); methocarbamol 750mg BID for muscle spasm PRN. Dilaudid 2mg q6 PRN for severe pain and dressing changes (started 05/03)  - Diclofenac gel added today 5/5  > c/w Fall precaution  > c/w Wound care recs  > f/u PT eval --> likely back to [facility]   > f/u Nutrition consult

## 2025-05-05 NOTE — PROGRESS NOTE ADULT - SUBJECTIVE AND OBJECTIVE BOX
Overnight events noted      VITAL:  T(C): , Max: 38.4 (05-05-25 @ 05:47)  T(F): , Max: 101.2 (05-05-25 @ 05:47)  HR: 118 (05-05-25 @ 05:47)  BP: 129/78 (05-05-25 @ 05:47)  BP(mean): --  RR: 18 (05-05-25 @ 05:47)  SpO2: 98% (05-05-25 @ 05:47)  Urine output 2800cc/24h      PHYSICAL EXAM:  Constitutional: NAD, Alert  HEENT: NCAT, MMM  Neck: Supple, No JVD  Respiratory: CTA-b/l  Cardiovascular: tachy reg s1s2  Gastrointestinal: BS+, soft, NT/ND  : (+)maldonado  Extremities: No peripheral edema b/l  Neurological: reduced generalized strength  Back: no CVAT b/l  Skin: No rashes, no nevi    LABS:                        7.0    10.09 )-----------( 177      ( 05 May 2025 06:43 )             22.3     Na(140)/K(3.9)/Cl(107)/HCO3(17)/BUN(103)/Cr(5.12)Glu(102)/Ca(8.4)/Mg(1.8)/PO4(5.2)    05-05 @ 06:43  Na(140)/K(4.2)/Cl(106)/HCO3(17)/BUN(106)/Cr(5.36)Glu(86)/Ca(8.6)/Mg(2.0)/PO4(5.6)    05-04 @ 06:55  Na(140)/K(4.6)/Cl(106)/HCO3(16)/BUN(135)/Cr(5.49)Glu(89)/Ca(8.6)/Mg(2.2)/PO4(5.4)    05-03 @ 06:59  Na(143)/K(4.7)/Cl(109)/HCO3(16)/BUN(144)/Cr(5.78)Glu(111)/Ca(8.5)/Mg(2.2)/PO4(5.9)    05-02 @ 18:07  Na(141)/K(4.7)/Cl(106)/HCO3(16)/BUN(125)/Cr(5.76)Glu(104)/Ca(8.6)/Mg(2.1)/PO4(6.5)    05-02 @ 09:56        IMPRESSION: 64M w/ HTN, gout, AFib, and CKD4-5, 5/1/25 p/w DEEDEE on CKD  (1)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  (2)DEEDEE - obstructive - steadily improving, s/p maldonado placement  (3)Metabolic acidosis - renally mediated - limited improvement in the numbers, on NaHCO3 975tid  (4)Hyperphosphatemia - improving, on Renvela/in setting of resolution of DEEDEE  (5)CV - hypertension - acceptably controlled, on Hydralazine/Lopressor  (6)Anemia -   (7)ID - febrile illness    RECOMMEND:  (1)Increase NaHCO3 to 1300mg po tid  (2)Favor PRBCs 1U  (3)Retacrit 10K SQ x 1 (I will order)  (4)Workup of fever per primary team  (5)Dose new meds for GFR <15ml/min (present dosing is acceptable)  (6)BMP+Mg+PO4 daily          Hilton May MD  [facility]  Office/on call physician: [phone]  Cell (7a-7p): [phone]       No pain, no sob, no fever or chills      VITAL:  T(C): , Max: 38.4 (05-05-25 @ 05:47)  T(F): , Max: 101.2 (05-05-25 @ 05:47)  HR: 118 (05-05-25 @ 05:47)  BP: 129/78 (05-05-25 @ 05:47)  RR: 18 (05-05-25 @ 05:47)  SpO2: 98% (05-05-25 @ 05:47)  Urine output 2800cc/24h      PHYSICAL EXAM:  Constitutional: NAD, Alert  HEENT: NCAT, MMM  Neck: Supple, No JVD  Respiratory: CTA-b/l  Cardiovascular: tachy reg s1s2  Gastrointestinal: BS+, soft, NT/ND  : (+)maldonado  Extremities: No peripheral edema b/l  Neurological: reduced generalized strength  Back: no CVAT b/l  Skin: No rashes, no nevi    LABS:                        7.0    10.09 )-----------( 177      ( 05 May 2025 06:43 )             22.3     Na(140)/K(3.9)/Cl(107)/HCO3(17)/BUN(103)/Cr(5.12)Glu(102)/Ca(8.4)/Mg(1.8)/PO4(5.2)    05-05 @ 06:43  Na(140)/K(4.2)/Cl(106)/HCO3(17)/BUN(106)/Cr(5.36)Glu(86)/Ca(8.6)/Mg(2.0)/PO4(5.6)    05-04 @ 06:55  Na(140)/K(4.6)/Cl(106)/HCO3(16)/BUN(135)/Cr(5.49)Glu(89)/Ca(8.6)/Mg(2.2)/PO4(5.4)    05-03 @ 06:59  Na(143)/K(4.7)/Cl(109)/HCO3(16)/BUN(144)/Cr(5.78)Glu(111)/Ca(8.5)/Mg(2.2)/PO4(5.9)    05-02 @ 18:07  Na(141)/K(4.7)/Cl(106)/HCO3(16)/BUN(125)/Cr(5.76)Glu(104)/Ca(8.6)/Mg(2.1)/PO4(6.5)    05-02 @ 09:56        IMPRESSION: 64M w/ HTN, gout, AFib, and CKD4-5, 5/1/25 p/w DEEDEE on CKD  (1)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  (2)DEEDEE - obstructive - steadily improving, s/p maldonado placement  (3)Metabolic acidosis - renally mediated - limited improvement in the numbers, on NaHCO3 975tid  (4)Hyperphosphatemia - improving, on Renvela/in setting of resolution of DEEDEE  (5)CV - hypertension - acceptably controlled, on Hydralazine/Lopressor  (6)Anemia -   (7)ID - febrile illness    RECOMMEND:  (1)Increase NaHCO3 to 1300mg po tid  (2)Favor PRBCs 1U  (3)Retacrit 10K SQ x 1 (I will order)  (4)Workup of fever per primary team  (5)Dose new meds for GFR <15ml/min (present dosing is acceptable)  (6)BMP+Mg+PO4 daily          Hilton May MD  [facility]  Office/on call physician: [phone]  Cell (7a-7p): [phone]

## 2025-05-05 NOTE — PROGRESS NOTE ADULT - PROBLEM SELECTOR PLAN 8
- Prostate CA s/p radiation; unclear if this is contributing to urinary obstruction and retention   - Home Myrbetriq 50mg daily    > Either oxybutynin 5mg BID, or pt can bring own home Myrbetriq   > f/u Outpatient - Home metoprolol succinate ER 100mg daily    > c/w home metoprolol succinate 100mg PO QD   > c/w eliquis 5mg as above

## 2025-05-05 NOTE — PROGRESS NOTE ADULT - PROBLEM SELECTOR PLAN 6
> c/w home hydralazine 25mg TID,   > c/w home metoprolol succinate 100mg PO QD - A1c: 4.6 4/3/2025  - Home Januvia 50 mg daily    > c/w ISS  > c/w POCT Blood Glucose pre-meal and at bedtime.  > Hold home Januvia, likely will discontinue upon d/c

## 2025-05-05 NOTE — PROGRESS NOTE ADULT - PROBLEM SELECTOR PLAN 7
- Home metoprolol succinate ER 100mg daily    > c/w home metoprolol succinate 100mg PO QD   > c/w eliquis 5mg as above  > f/u EKG > c/w home hydralazine 25mg TID,   > c/w home metoprolol succinate 100mg PO QD

## 2025-05-05 NOTE — PROGRESS NOTE ADULT - SUBJECTIVE AND OBJECTIVE BOX
Patient is a 64y old  Male who presents with a chief complaint of DEEDEE on CKD, normocytic anemia (04 May 2025 12:27)      INTERVAL HX:  Febrile overnight    Allergies:  penicillin (Unknown)  clindamycin (Rash)    Medications:  acetaminophen     Tablet .. 650 milliGRAM(s) Oral every 6 hours PRN  apixaban 5 milliGRAM(s) Oral two times a day  ferrous    sulfate 325 milliGRAM(s) Oral daily  hydrALAZINE 25 milliGRAM(s) Oral every 8 hours  HYDROmorphone   Tablet 2 milliGRAM(s) Oral every 6 hours PRN  lactulose Syrup 10 Gram(s) Oral once PRN  lidocaine   4% Patch 1 Patch Transdermal daily PRN  methocarbamol 750 milliGRAM(s) Oral every 12 hours PRN  metoprolol succinate  milliGRAM(s) Oral daily  Nephro-shannon 1 Tablet(s) Oral daily  polyethylene glycol 3350 17 Gram(s) Oral two times a day  senna 2 Tablet(s) Oral at bedtime  sevelamer carbonate 800 milliGRAM(s) Oral two times a day  sodium bicarbonate 975 milliGRAM(s) Oral three times a day  sodium chloride 0.45%. 1000 milliLiter(s) IV Continuous <Continuous>  sodium chloride 0.45%. 1000 milliLiter(s) IV Continuous <Continuous>  traMADol 50 milliGRAM(s) Oral every 12 hours PRN    Vitals:  T(C): 37.9 (05-05-25 @ 06:50), Max: 38.4 (05-05-25 @ 05:47)  HR: 118 (05-05-25 @ 05:47) (111 - 118)  BP: 129/78 (05-05-25 @ 05:47) (129/78 - 146/93)  RR: 18 (05-05-25 @ 05:47) (18 - 18)  SpO2: 98% (05-05-25 @ 05:47) (98% - 100%)  I/O's:    05-04-25 @ 07:01  -  05-05-25 @ 07:00  --------------------------------------------------------  IN: 240 mL / OUT: 2800 mL / NET: -2560 mL      Physical Exam:    Labs:                        7.1    8.46  )-----------( 169      ( 04 May 2025 11:22 )             22.7     05-04    140  |  106  |  106[H]  ----------------------------<  86  4.2   |  17[L]  |  5.36[H]    Ca    8.6      04 May 2025 06:55  Phos  5.6     05-04  Mg     2.0     05-04    TPro  6.2  /  Alb  3.0[L]  /  TBili  0.6  /  DBili  x   /  AST  9[L]  /  ALT  9[L]  /  AlkPhos  77  05-04        Radiology/Procedures: Reviewed.   Patient is a 64y old  Male who presents with a chief complaint of DEEDEE on CKD, normocytic anemia (04 May 2025 12:27)      INTERVAL HX:  Febrile overnight to 101.2, blood cultures, RVP, UA, CXR ordered. Pt seen and examined at bedside this AM, denies CP, SOB, cough, dysuria, diarrhea/constipation, localizing infectious symptoms. C/o L knee pain. Discussed plan of care, questions answered    Allergies:  penicillin (Unknown)  clindamycin (Rash)    Medications:  acetaminophen     Tablet .. 650 milliGRAM(s) Oral every 6 hours PRN  apixaban 5 milliGRAM(s) Oral two times a day  ferrous    sulfate 325 milliGRAM(s) Oral daily  hydrALAZINE 25 milliGRAM(s) Oral every 8 hours  HYDROmorphone   Tablet 2 milliGRAM(s) Oral every 6 hours PRN  lactulose Syrup 10 Gram(s) Oral once PRN  lidocaine   4% Patch 1 Patch Transdermal daily PRN  methocarbamol 750 milliGRAM(s) Oral every 12 hours PRN  metoprolol succinate  milliGRAM(s) Oral daily  Nephro-shannon 1 Tablet(s) Oral daily  polyethylene glycol 3350 17 Gram(s) Oral two times a day  senna 2 Tablet(s) Oral at bedtime  sevelamer carbonate 800 milliGRAM(s) Oral two times a day  sodium bicarbonate 975 milliGRAM(s) Oral three times a day  sodium chloride 0.45%. 1000 milliLiter(s) IV Continuous <Continuous>  sodium chloride 0.45%. 1000 milliLiter(s) IV Continuous <Continuous>  traMADol 50 milliGRAM(s) Oral every 12 hours PRN    Vitals:  T(C): 37.9 (05-05-25 @ 06:50), Max: 38.4 (05-05-25 @ 05:47)  HR: 118 (05-05-25 @ 05:47) (111 - 118)  BP: 129/78 (05-05-25 @ 05:47) (129/78 - 146/93)  RR: 18 (05-05-25 @ 05:47) (18 - 18)  SpO2: 98% (05-05-25 @ 05:47) (98% - 100%)  I/O's:    05-04-25 @ 07:01  -  05-05-25 @ 07:00  --------------------------------------------------------  IN: 240 mL / OUT: 2800 mL / NET: -2560 mL      Physical Exam:  GENERAL: NAD, resting in bed  HEAD: normocephalic, atraumatic  HEENT: normal conjunctiva, oral mucosa moist, uvula midline, no tonsilar exudates, no JVD  CARDIAC: regular rate and rhythm, normal S1S2  PULM: normal breath sounds, clear to ascultation bilaterally, no rales, rhonchi, wheezing  GI: Abd soft, nondistended, nontender, no rebound tenderness, no guarding, no rigidity  : +Woods  NEURO: no focal motor or sensory deficits, AAOx3  MSK: ROM intact, no peripheral edema, no calf tenderness b/l  SKIN: well-perfused, extremities warm, no visible rashes      Labs:                        7.1    8.46  )-----------( 169      ( 04 May 2025 11:22 )             22.7     05-04    140  |  106  |  106[H]  ----------------------------<  86  4.2   |  17[L]  |  5.36[H]    Ca    8.6      04 May 2025 06:55  Phos  5.6     05-04  Mg     2.0     05-04    TPro  6.2  /  Alb  3.0[L]  /  TBili  0.6  /  DBili  x   /  AST  9[L]  /  ALT  9[L]  /  AlkPhos  77  05-04        Radiology/Procedures: Reviewed.

## 2025-05-06 DIAGNOSIS — N39.0 URINARY TRACT INFECTION, SITE NOT SPECIFIED: ICD-10-CM

## 2025-05-06 LAB
ALBUMIN SERPL ELPH-MCNC: 2.5 G/DL — LOW (ref 3.3–5)
ALP SERPL-CCNC: 87 U/L — SIGNIFICANT CHANGE UP (ref 40–120)
ALT FLD-CCNC: 15 U/L — SIGNIFICANT CHANGE UP (ref 10–45)
ANION GAP SERPL CALC-SCNC: 18 MMOL/L — HIGH (ref 5–17)
AST SERPL-CCNC: 20 U/L — SIGNIFICANT CHANGE UP (ref 10–40)
BILIRUB SERPL-MCNC: 0.8 MG/DL — SIGNIFICANT CHANGE UP (ref 0.2–1.2)
BUN SERPL-MCNC: 111 MG/DL — HIGH (ref 7–23)
CALCIUM SERPL-MCNC: 8.6 MG/DL — SIGNIFICANT CHANGE UP (ref 8.4–10.5)
CHLORIDE SERPL-SCNC: 106 MMOL/L — SIGNIFICANT CHANGE UP (ref 96–108)
CO2 SERPL-SCNC: 18 MMOL/L — LOW (ref 22–31)
CREAT SERPL-MCNC: 4.91 MG/DL — HIGH (ref 0.5–1.3)
EGFR: 12 ML/MIN/1.73M2 — LOW
EGFR: 12 ML/MIN/1.73M2 — LOW
GAS PNL BLDV: SIGNIFICANT CHANGE UP
GLUCOSE SERPL-MCNC: 102 MG/DL — HIGH (ref 70–99)
HCT VFR BLD CALC: 25.1 % — LOW (ref 39–50)
HGB BLD-MCNC: 7.9 G/DL — LOW (ref 13–17)
MAGNESIUM SERPL-MCNC: 1.9 MG/DL — SIGNIFICANT CHANGE UP (ref 1.6–2.6)
MCHC RBC-ENTMCNC: 29.8 PG — SIGNIFICANT CHANGE UP (ref 27–34)
MCHC RBC-ENTMCNC: 31.5 G/DL — LOW (ref 32–36)
MCV RBC AUTO: 94.7 FL — SIGNIFICANT CHANGE UP (ref 80–100)
NRBC BLD AUTO-RTO: 0 /100 WBCS — SIGNIFICANT CHANGE UP (ref 0–0)
PHOSPHATE SERPL-MCNC: 5.4 MG/DL — HIGH (ref 2.5–4.5)
PLATELET # BLD AUTO: 171 K/UL — SIGNIFICANT CHANGE UP (ref 150–400)
POTASSIUM SERPL-MCNC: 3.9 MMOL/L — SIGNIFICANT CHANGE UP (ref 3.5–5.3)
POTASSIUM SERPL-SCNC: 3.9 MMOL/L — SIGNIFICANT CHANGE UP (ref 3.5–5.3)
PROT SERPL-MCNC: 5.9 G/DL — LOW (ref 6–8.3)
RBC # BLD: 2.65 M/UL — LOW (ref 4.2–5.8)
RBC # FLD: 14.7 % — HIGH (ref 10.3–14.5)
SODIUM SERPL-SCNC: 142 MMOL/L — SIGNIFICANT CHANGE UP (ref 135–145)
WBC # BLD: 11.92 K/UL — HIGH (ref 3.8–10.5)
WBC # FLD AUTO: 11.92 K/UL — HIGH (ref 3.8–10.5)

## 2025-05-06 PROCEDURE — 99233 SBSQ HOSP IP/OBS HIGH 50: CPT | Mod: GC

## 2025-05-06 RX ORDER — SEVELAMER HYDROCHLORIDE 800 MG/1
800 TABLET ORAL
Refills: 0 | Status: DISCONTINUED | OUTPATIENT
Start: 2025-05-06 | End: 2025-05-06

## 2025-05-06 RX ORDER — AZTREONAM 2 G/1
1000 INJECTION, POWDER, LYOPHILIZED, FOR SOLUTION INTRAMUSCULAR; INTRAVENOUS ONCE
Refills: 0 | Status: COMPLETED | OUTPATIENT
Start: 2025-05-06 | End: 2025-05-06

## 2025-05-06 RX ORDER — AZTREONAM 2 G/1
1000 INJECTION, POWDER, LYOPHILIZED, FOR SOLUTION INTRAMUSCULAR; INTRAVENOUS EVERY 12 HOURS
Refills: 0 | Status: COMPLETED | OUTPATIENT
Start: 2025-05-06 | End: 2025-05-10

## 2025-05-06 RX ORDER — AZTREONAM 2 G/1
INJECTION, POWDER, LYOPHILIZED, FOR SOLUTION INTRAMUSCULAR; INTRAVENOUS
Refills: 0 | Status: COMPLETED | OUTPATIENT
Start: 2025-05-06 | End: 2025-05-10

## 2025-05-06 RX ORDER — SEVELAMER HYDROCHLORIDE 800 MG/1
800 TABLET ORAL
Refills: 0 | Status: DISCONTINUED | OUTPATIENT
Start: 2025-05-06 | End: 2025-05-09

## 2025-05-06 RX ADMIN — Medication 2 MILLIGRAM(S): at 11:50

## 2025-05-06 RX ADMIN — DICLOFENAC SODIUM 4 GRAM(S): 10 GEL TOPICAL at 05:14

## 2025-05-06 RX ADMIN — TRAMADOL HYDROCHLORIDE 50 MILLIGRAM(S): 50 TABLET, FILM COATED ORAL at 05:13

## 2025-05-06 RX ADMIN — Medication 0.5 MILLIGRAM(S): at 01:11

## 2025-05-06 RX ADMIN — Medication 1300 MILLIGRAM(S): at 13:39

## 2025-05-06 RX ADMIN — APIXABAN 5 MILLIGRAM(S): 2.5 TABLET, FILM COATED ORAL at 05:13

## 2025-05-06 RX ADMIN — Medication 650 MILLIGRAM(S): at 17:24

## 2025-05-06 RX ADMIN — Medication 2 MILLIGRAM(S): at 18:56

## 2025-05-06 RX ADMIN — AZTREONAM 50 MILLIGRAM(S): 2 INJECTION, POWDER, LYOPHILIZED, FOR SOLUTION INTRAMUSCULAR; INTRAVENOUS at 17:24

## 2025-05-06 RX ADMIN — Medication 1300 MILLIGRAM(S): at 05:14

## 2025-05-06 RX ADMIN — Medication 2 MILLIGRAM(S): at 00:00

## 2025-05-06 RX ADMIN — APIXABAN 5 MILLIGRAM(S): 2.5 TABLET, FILM COATED ORAL at 17:24

## 2025-05-06 RX ADMIN — TRAMADOL HYDROCHLORIDE 50 MILLIGRAM(S): 50 TABLET, FILM COATED ORAL at 21:27

## 2025-05-06 RX ADMIN — Medication 25 MILLIGRAM(S): at 21:27

## 2025-05-06 RX ADMIN — SEVELAMER HYDROCHLORIDE 800 MILLIGRAM(S): 800 TABLET ORAL at 17:24

## 2025-05-06 RX ADMIN — Medication 25 MILLIGRAM(S): at 13:39

## 2025-05-06 RX ADMIN — AZTREONAM 50 MILLIGRAM(S): 2 INJECTION, POWDER, LYOPHILIZED, FOR SOLUTION INTRAMUSCULAR; INTRAVENOUS at 11:10

## 2025-05-06 RX ADMIN — TRAMADOL HYDROCHLORIDE 50 MILLIGRAM(S): 50 TABLET, FILM COATED ORAL at 06:00

## 2025-05-06 RX ADMIN — Medication 1 TABLET(S): at 11:11

## 2025-05-06 RX ADMIN — Medication 650 MILLIGRAM(S): at 18:00

## 2025-05-06 RX ADMIN — Medication 0.5 MILLIGRAM(S): at 02:00

## 2025-05-06 RX ADMIN — TRAMADOL HYDROCHLORIDE 50 MILLIGRAM(S): 50 TABLET, FILM COATED ORAL at 22:20

## 2025-05-06 RX ADMIN — Medication 2 MILLIGRAM(S): at 11:25

## 2025-05-06 RX ADMIN — Medication 25 MILLIGRAM(S): at 05:14

## 2025-05-06 RX ADMIN — Medication 2 MILLIGRAM(S): at 19:45

## 2025-05-06 RX ADMIN — SEVELAMER HYDROCHLORIDE 800 MILLIGRAM(S): 800 TABLET ORAL at 05:14

## 2025-05-06 RX ADMIN — Medication 325 MILLIGRAM(S): at 11:11

## 2025-05-06 RX ADMIN — METOPROLOL SUCCINATE 100 MILLIGRAM(S): 50 TABLET, EXTENDED RELEASE ORAL at 05:14

## 2025-05-06 RX ADMIN — Medication 1300 MILLIGRAM(S): at 21:27

## 2025-05-06 NOTE — PROGRESS NOTE ADULT - PROBLEM SELECTOR PLAN 8
- Home metoprolol succinate ER 100mg daily    > c/w home metoprolol succinate 100mg PO QD   > c/w eliquis 5mg as above

## 2025-05-06 NOTE — PROGRESS NOTE ADULT - PROBLEM SELECTOR PLAN 2
Febrile overnight 5/4, TMax 101.2. No localizing infectious symptoms    - F/u UA, RVP, blood cultures, urinalysis, CXR  - Abx pending workup above Febrile overnight 5/4, TMax 101.2. No complaint of dysuria but cloudy urine noted in Woods bag, UA grossly (+). RVP negative, CXR clr.    - S/p Vancomycin 750mg 5/5  - 5/6 will start Aztreonam (penicillin allergy)  - F/u urine cx, blood cx

## 2025-05-06 NOTE — PROGRESS NOTE ADULT - PROBLEM SELECTOR PLAN 6
- A1c: 4.6 4/3/2025  - Home Januvia 50 mg daily    > c/w ISS  > c/w POCT Blood Glucose pre-meal and at bedtime.  > Hold home Januvia, likely will discontinue upon d/c

## 2025-05-06 NOTE — PROGRESS NOTE ADULT - SUBJECTIVE AND OBJECTIVE BOX
Overnight events noted      VITAL:  T(C): , Max: 38.3 (05-05-25 @ 20:07)  T(F): , Max: 100.9 (05-05-25 @ 20:07)  HR: 110 (05-06-25 @ 04:14)  BP: 145/99 (05-06-25 @ 04:14)  BP(mean): --  RR: 18 (05-06-25 @ 04:14)  SpO2: 97% (05-06-25 @ 04:14)  Wt(kg): --      PHYSICAL EXAM:  Constitutional: NAD, Alert  HEENT: NCAT, MMM  Neck: Supple, No JVD  Respiratory: CTA-b/l  Cardiovascular: tachy reg s1s2  Gastrointestinal: BS+, soft, NT/ND  : (+)maldonado  Extremities: No peripheral edema b/l  Neurological: reduced generalized strength  Back: no CVAT b/l  Skin: No rashes, no nevi    LABS:                        7.0    10.09 )-----------( 177      ( 05 May 2025 06:43 )             22.3     Na(142)/K(3.9)/Cl(106)/HCO3(18)/BUN(111)/Cr(4.91)Glu(102)/Ca(8.6)/Mg(1.9)/PO4(5.4)    05-06 @ 06:58  Na(140)/K(3.9)/Cl(107)/HCO3(17)/BUN(103)/Cr(5.12)Glu(102)/Ca(8.4)/Mg(1.8)/PO4(5.2)    05-05 @ 06:43  Na(140)/K(4.2)/Cl(106)/HCO3(17)/BUN(106)/Cr(5.36)Glu(86)/Ca(8.6)/Mg(2.0)/PO4(5.6)    05-04 @ 06:55      IMPRESSION: 64M w/ HTN, gout, AFib, and CKD4-5, 5/1/25 p/w DEEDEE on CKD  (1)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  (2)DEEDEE - obstructive - steadily improving, s/p maldonado placement  (3)Metabolic acidosis - renally mediated - slowly improving, on PO NaHCO3 and with improvement in the DEEDEE  (4)Hyperphosphatemia -PO4 high - the Renvela should be given with meals  (5)CV - BP mildly high but acceptable for now  (6)Anemia -   (7)Ortho - would avoid Diclofenac gel here given his severe renal impairment  (8)ID - febrile illness      RECOMMEND:  (1)Adjust Renvela - 800mg per meal  (2)D/C Diclofenac gel  (3)Favor PRBCs 1U  (4)Workup/management of fever per primary team  (5)Dose new meds for GFR <15ml/min (present dosing is acceptable)  (6)BMP+Mg+PO4 daily while admitted            Hilton May MD  [facility]  Office/on call physician: [phone]  Cell (7a-7p): [phone]       Overnight events noted      VITAL:  T(C): , Max: 38.3 (05-05-25 @ 20:07)  T(F): , Max: 100.9 (05-05-25 @ 20:07)  HR: 110 (05-06-25 @ 04:14)  BP: 145/99 (05-06-25 @ 04:14)  BP(mean): --  RR: 18 (05-06-25 @ 04:14)  SpO2: 97% (05-06-25 @ 04:14)  Wt(kg): --      PHYSICAL EXAM:  Constitutional: NAD, Alert  HEENT: NCAT, MMM  Neck: Supple, No JVD  Respiratory: CTA-b/l  Cardiovascular: tachy reg s1s2  Gastrointestinal: BS+, soft, NT/ND  : (+)maldonado  Extremities: No peripheral edema b/l  Neurological: reduced generalized strength  Back: no CVAT b/l  Skin: No rashes, no nevi    LABS:                        7.0    10.09 )-----------( 177      ( 05 May 2025 06:43 )             22.3     Na(142)/K(3.9)/Cl(106)/HCO3(18)/BUN(111)/Cr(4.91)Glu(102)/Ca(8.6)/Mg(1.9)/PO4(5.4)    05-06 @ 06:58  Na(140)/K(3.9)/Cl(107)/HCO3(17)/BUN(103)/Cr(5.12)Glu(102)/Ca(8.4)/Mg(1.8)/PO4(5.2)    05-05 @ 06:43  Na(140)/K(4.2)/Cl(106)/HCO3(17)/BUN(106)/Cr(5.36)Glu(86)/Ca(8.6)/Mg(2.0)/PO4(5.6)    05-04 @ 06:55      IMPRESSION: 64M w/ HTN, gout, AFib, and CKD4-5, 5/1/25 p/w DEEDEE on CKD  (1)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  (2)DEEDEE - obstructive - steadily improving, s/p maldonado placement  (3)Metabolic acidosis - renally mediated - slowly improving, on PO NaHCO3 and with improvement in the DEEDEE  (4)Hyperphosphatemia -PO4 high - the Renvela should be given with meals  (5)CV - BP mildly high but acceptable for now  (6)Anemia - s/p 1U PRBCs yesterday; s/p Retacrit as well  (7)Ortho - would avoid Diclofenac gel here given his severe renal impairment  (8)ID - febrile illness      RECOMMEND:  (1)Adjust Renvela - 800mg per meal  (2)D/C Diclofenac gel  (3)Workup/management of fever per primary team  (4)Dose new meds for GFR <15ml/min (present dosing is acceptable)  (5)BMP+Mg+PO4 daily while admitted            Hilton May MD  [facility]  Office/on call physician: [phone]  Cell (7a-7p): [phone]       No pain, no sob      VITAL:  T(C): , Max: 38.3 (05-05-25 @ 20:07)  T(F): , Max: 100.9 (05-05-25 @ 20:07)  HR: 110 (05-06-25 @ 04:14)  BP: 145/99 (05-06-25 @ 04:14)  BP(mean): --  RR: 18 (05-06-25 @ 04:14)  SpO2: 97% (05-06-25 @ 04:14)  Wt(kg): --      PHYSICAL EXAM:  Constitutional: NAD, Alert  HEENT: NCAT, MMM  Neck: Supple, No JVD  Respiratory: CTA-b/l  Cardiovascular: tachy reg s1s2  Gastrointestinal: BS+, soft, NT/ND  : (+)maldonado  Extremities: No peripheral edema b/l  Neurological: reduced generalized strength  Back: no CVAT b/l  Skin: No rashes, no nevi    LABS:                        7.0    10.09 )-----------( 177      ( 05 May 2025 06:43 )             22.3     Na(142)/K(3.9)/Cl(106)/HCO3(18)/BUN(111)/Cr(4.91)Glu(102)/Ca(8.6)/Mg(1.9)/PO4(5.4)    05-06 @ 06:58  Na(140)/K(3.9)/Cl(107)/HCO3(17)/BUN(103)/Cr(5.12)Glu(102)/Ca(8.4)/Mg(1.8)/PO4(5.2)    05-05 @ 06:43  Na(140)/K(4.2)/Cl(106)/HCO3(17)/BUN(106)/Cr(5.36)Glu(86)/Ca(8.6)/Mg(2.0)/PO4(5.6)    05-04 @ 06:55      IMPRESSION: 64M w/ HTN, gout, AFib, and CKD4-5, 5/1/25 p/w DEEDEE on CKD  (1)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  (2)DEEDEE - obstructive - steadily improving, s/p maldonado placement  (3)Metabolic acidosis - renally mediated - slowly improving, on PO NaHCO3 and with improvement in the DEEDEE  (4)Hyperphosphatemia -PO4 high - the Renvela should be given with meals  (5)CV - BP mildly high but acceptable for now  (6)Anemia - s/p 1U PRBCs yesterday; s/p Retacrit as well  (7)Ortho - would avoid Diclofenac gel here given his severe renal impairment  (8)ID - febrile illness      RECOMMEND:  (1)Adjust Renvela - 800mg per meal  (2)D/C Diclofenac gel  (3)Workup/management of fever per primary team  (4)Dose new meds for GFR <15ml/min (present dosing is acceptable)  (5)BMP+Mg+PO4 daily while admitted            Hilton May MD  [facility]  Office/on call physician: [phone]  Cell (7a-7p): [phone]

## 2025-05-06 NOTE — PROGRESS NOTE ADULT - PROBLEM SELECTOR PLAN 1
- Per nephro note in prior hospitalization: unclear if DEEDEE on CKD vs slowly progressive CKD.   - 5/1/25 Cr 5.85 at admission. (In prior hospitalization, Cr 3.9 on admission. Per Dr. Woo, baseline Cr is 2.5)  - Nephrology consulted - Dr. Hilton May (Nephrologist at [facility]): would ideally like to defer HD for now  - Was previously followed by nephrologist (Dr Mauricio Cortes) but hasn't seen for last couple of years.  - 4/25/25 Renal US: multiple bilateral renal cysts, which are increased in size and number compared to the prior CT scan - c/f neoplasm  - Urine Lytes (05/02): FeNa 4.2% suggestive of intrinsic disease  - s/p maldonado catheter placement in ER with 2.7L fluid removed  - Ddx: Likely DEEDEE on CKD 2/2 post-renal DEEDEE s/p 2.7L fluid removed on maldonado catheter; now possibly also with component of pre-renal DEEDEE due to large volume UOP after maldonado placement, which may explain only slight decrease in Cr. A more notable improvement in Cr would have been expected after maldonado placement for a true post-renal DEEDEE    > c/w indwelling maldonado (05/01-  > daily BMP/Mg/Phos  > increase bicarb to 1300mg PO TID (up-titrated 05/05)  > C/w Sevelamer 800 mg BID; d/c once phos normalizes   > c/w strict intake/output Q6H  > c/w NS 0.45% at 100 cc/hr if UOP continues to remain >125 cc/hr to minimize risk of hypovolemia-associated complications from post-obstructive complications (up-titrated 75cc->100cc on 05/04)  > f/u Nephrology recommendations

## 2025-05-06 NOTE — PROGRESS NOTE ADULT - PROBLEM SELECTOR PLAN 9
- Prostate CA s/p radiation; unclear if this is contributing to urinary obstruction and retention   - Home Myrbetriq 50mg daily    > f/u Outpatient  - Holding Myrebetiq for now

## 2025-05-06 NOTE — PROGRESS NOTE ADULT - SUBJECTIVE AND OBJECTIVE BOX
Patient is a 64y old  Male who presents with a chief complaint of DEEDEE on CKD, normocytic anemia (05 May 2025 07:51)      INTERVAL HX:  Febrile ON    Allergies:  penicillin (Unknown)  clindamycin (Rash)    Medications:  acetaminophen     Tablet .. 650 milliGRAM(s) Oral every 6 hours PRN  apixaban 5 milliGRAM(s) Oral two times a day  diclofenac sodium 1% Gel 4 Gram(s) Topical four times a day  ferrous    sulfate 325 milliGRAM(s) Oral daily  hydrALAZINE 25 milliGRAM(s) Oral every 8 hours  HYDROmorphone   Tablet 2 milliGRAM(s) Oral every 6 hours PRN  lactulose Syrup 10 Gram(s) Oral once PRN  lidocaine   4% Patch 1 Patch Transdermal daily PRN  methocarbamol 750 milliGRAM(s) Oral every 12 hours PRN  metoprolol succinate  milliGRAM(s) Oral daily  Nephro-shannon 1 Tablet(s) Oral daily  polyethylene glycol 3350 17 Gram(s) Oral two times a day  senna 2 Tablet(s) Oral at bedtime  sevelamer carbonate 800 milliGRAM(s) Oral two times a day  sodium bicarbonate 1300 milliGRAM(s) Oral three times a day  sodium chloride 0.45%. 1000 milliLiter(s) IV Continuous <Continuous>  traMADol 50 milliGRAM(s) Oral every 12 hours PRN    Vitals:  T(C): 37.4 (05-06-25 @ 04:14), Max: 38.3 (05-05-25 @ 20:07)  HR: 110 (05-06-25 @ 04:14) (90 - 110)  BP: 145/99 (05-06-25 @ 04:14) (134/79 - 153/95)  RR: 18 (05-06-25 @ 04:14) (18 - 18)  SpO2: 97% (05-06-25 @ 04:14) (95% - 99%)  I/O's:    05-05-25 @ 07:01  -  05-06-25 @ 07:00  --------------------------------------------------------  IN: 320 mL / OUT: 2100 mL / NET: -1780 mL      Physical Exam:    Labs:                        7.0    10.09 )-----------( 177      ( 05 May 2025 06:43 )             22.3     05-06    142  |  106  |  x   ----------------------------<  102[H]  3.9   |  18[L]  |  4.91[H]    Ca    8.6      06 May 2025 06:58  Phos  5.4     05-06  Mg     1.9     05-06    TPro  5.9[L]  /  Alb  2.5[L]  /  TBili  0.8  /  DBili  x   /  AST  20  /  ALT  15  /  AlkPhos  87  05-06        Radiology/Procedures: Reviewed.   Patient is a 64y old  Male who presents with a chief complaint of DEEDEE on CKD, normocytic anemia (05 May 2025 07:51)      INTERVAL HX:  Febrile ON. Pt seen and examined at bedside this AM, states knee pain is persistent otherwise no acute complaints elicited Discussed plan of care, questions answered, discussed findings of UTI    Allergies:  penicillin (Unknown)  clindamycin (Rash)    Medications:  acetaminophen     Tablet .. 650 milliGRAM(s) Oral every 6 hours PRN  apixaban 5 milliGRAM(s) Oral two times a day  diclofenac sodium 1% Gel 4 Gram(s) Topical four times a day  ferrous    sulfate 325 milliGRAM(s) Oral daily  hydrALAZINE 25 milliGRAM(s) Oral every 8 hours  HYDROmorphone   Tablet 2 milliGRAM(s) Oral every 6 hours PRN  lactulose Syrup 10 Gram(s) Oral once PRN  lidocaine   4% Patch 1 Patch Transdermal daily PRN  methocarbamol 750 milliGRAM(s) Oral every 12 hours PRN  metoprolol succinate  milliGRAM(s) Oral daily  Nephro-shannon 1 Tablet(s) Oral daily  polyethylene glycol 3350 17 Gram(s) Oral two times a day  senna 2 Tablet(s) Oral at bedtime  sevelamer carbonate 800 milliGRAM(s) Oral two times a day  sodium bicarbonate 1300 milliGRAM(s) Oral three times a day  sodium chloride 0.45%. 1000 milliLiter(s) IV Continuous <Continuous>  traMADol 50 milliGRAM(s) Oral every 12 hours PRN    Vitals:  T(C): 37.4 (05-06-25 @ 04:14), Max: 38.3 (05-05-25 @ 20:07)  HR: 110 (05-06-25 @ 04:14) (90 - 110)  BP: 145/99 (05-06-25 @ 04:14) (134/79 - 153/95)  RR: 18 (05-06-25 @ 04:14) (18 - 18)  SpO2: 97% (05-06-25 @ 04:14) (95% - 99%)  I/O's:    05-05-25 @ 07:01  -  05-06-25 @ 07:00  --------------------------------------------------------  IN: 320 mL / OUT: 2100 mL / NET: -1780 mL      Physical Exam:  GENERAL: NAD, resting in bed  HEAD: normocephalic, atraumatic  HEENT: normal conjunctiva, oral mucosa moist, uvula midline, no tonsilar exudates, no JVD  CARDIAC: regular rate and rhythm, normal S1S2  PULM: normal breath sounds, clear to ascultation bilaterally, no rales, rhonchi, wheezing  GI: Abd soft, nondistended, nontender, no rebound tenderness, no guarding, no rigidity  : +Woods with cloudy urine  NEURO: no focal motor or sensory deficits, AAOx3  MSK: ROM intact, no peripheral edema, no calf tenderness b/l  SKIN: well-perfused, extremities warm, no visible rashes    Labs:                        7.0    10.09 )-----------( 177      ( 05 May 2025 06:43 )             22.3     05-06    142  |  106  |  x   ----------------------------<  102[H]  3.9   |  18[L]  |  4.91[H]    Ca    8.6      06 May 2025 06:58  Phos  5.4     05-06  Mg     1.9     05-06    TPro  5.9[L]  /  Alb  2.5[L]  /  TBili  0.8  /  DBili  x   /  AST  20  /  ALT  15  /  AlkPhos  87  05-06        Radiology/Procedures: Reviewed.

## 2025-05-06 NOTE — PROGRESS NOTE ADULT - PROBLEM SELECTOR PLAN 5
- Recent hospitalization after mechanical fall.  - Chronic L knee pain due to severe OA  - Home pain as-needed pain meds include: Acetaminophen; Tramadol 50mg Q8H for pain (4-6); methocarbamol 750mg BID; lidocaine patch.    > c/w PRN Lidocaine patch  > PO pain regimen as needed: Tylenol 650mg Q6H for mild pain PRN, Tramadol 50mg BID for moderate pain PRN (renally-adjusted); methocarbamol 750mg BID for muscle spasm PRN. Dilaudid 2mg q6 PRN for severe pain and dressing changes (started 05/03)  - Diclofenac gel added today 5/5  > c/w Fall precaution  > c/w Wound care recs  > f/u PT eval --> likely back to [facility]   > f/u Nutrition consult - Recent hospitalization after mechanical fall.  - Chronic L knee pain due to severe OA  - Home pain as-needed pain meds include: Acetaminophen; Tramadol 50mg Q8H for pain (4-6); methocarbamol 750mg BID; lidocaine patch.    > c/w PRN Lidocaine patch  > PO pain regimen as needed: Tylenol 650mg Q6H for mild pain PRN, Tramadol 50mg BID for moderate pain PRN (renally-adjusted); methocarbamol 750mg BID for muscle spasm PRN. Dilaudid 2mg q6 PRN for severe pain and dressing changes (started 05/03)  > c/w Fall precaution  > c/w Wound care recs  > f/u PT eval --> likely back to [facility]   > f/u Nutrition consult

## 2025-05-06 NOTE — PROGRESS NOTE ADULT - PROBLEM SELECTOR PLAN 7
> c/w home hydralazine 25mg TID,   > c/w home metoprolol succinate 100mg PO QD > c/w home hydralazine 25mg TID  > c/w home metoprolol succinate 100mg PO QD

## 2025-05-06 NOTE — PROGRESS NOTE ADULT - PROBLEM SELECTOR PLAN 4
- Hgb trend: 10.7 (4/5) -> 8.6 (4/14) -> 8.5 (05/01)  - Pt denies melena or hematochezia  - Home ferrous sulfate 325 mg (65mg iron) daily  - Iron studies c/w AOCD  - Reticulocyte index: 0.5, likely 2/2 CKD   - Possibly progressive ACD d/t CKD.    > Hgb = 7 5/5, 1/2u pRBC x2 ordered, Retacrit 10k SQ x1 ordered   > Trend CBC  > Maintain active T&S (last 05/04)  > c/w home ferrous sulfate 325mg PO QD - Hgb trend: 10.7 (4/5) -> 8.6 (4/14) -> 8.5 (05/01)  - Pt denies melena or hematochezia  - Home ferrous sulfate 325 mg (65mg iron) daily  - Iron studies c/w AOCD  - Reticulocyte index: 0.5, likely 2/2 CKD   - Possibly progressive ACD d/t CKD.    > Hgb = 7 5/5, 1/2u pRBC x2 ordered, Retacrit 10k SQ x1 ordered  > Trend CBC  > Maintain active T&S (last 05/04)  > c/w home ferrous sulfate 325mg PO QD

## 2025-05-06 NOTE — PROGRESS NOTE ADULT - ASSESSMENT
63 YO M w/ PMH of DM2 (on Januvia), HTN, HLD, gout, A-fib (on Eliquis), CKD not on dialysis (last documented Cr 2.82 on 4/5/25), severe OA, prostate cancer (s/p radiation), recent admission for fall discharged to [facility], admitted for elevated sCr s/p 2.7L removal on initial straight catheterization - likely post-renal DEEDEE. 65 YO M w/ PMH of DM2 (on Januvia), HTN, HLD, gout, A-fib (on Eliquis), CKD not on dialysis (last documented Cr 2.82 on 4/5/25), severe OA, prostate cancer (s/p radiation), recent admission for fall discharged to [facility], admitted for elevated sCr s/p 2.7L removal and Woods placement - likely post-renal DEEDEE, course c/b UTI

## 2025-05-07 LAB
-  AMOXICILLIN/CLAVULANIC ACID: SIGNIFICANT CHANGE UP
-  AMPICILLIN/SULBACTAM: SIGNIFICANT CHANGE UP
-  AMPICILLIN: SIGNIFICANT CHANGE UP
-  AZTREONAM: SIGNIFICANT CHANGE UP
-  CEFAZOLIN: SIGNIFICANT CHANGE UP
-  CEFEPIME: SIGNIFICANT CHANGE UP
-  CEFOXITIN: SIGNIFICANT CHANGE UP
-  CEFTRIAXONE: SIGNIFICANT CHANGE UP
-  CEFUROXIME: SIGNIFICANT CHANGE UP
-  CIPROFLOXACIN: SIGNIFICANT CHANGE UP
-  ERTAPENEM: SIGNIFICANT CHANGE UP
-  GENTAMICIN: SIGNIFICANT CHANGE UP
-  IMIPENEM: SIGNIFICANT CHANGE UP
-  LEVOFLOXACIN: SIGNIFICANT CHANGE UP
-  MEROPENEM: SIGNIFICANT CHANGE UP
-  NITROFURANTOIN: SIGNIFICANT CHANGE UP
-  PIPERACILLIN/TAZOBACTAM: SIGNIFICANT CHANGE UP
-  TIGECYCLINE: SIGNIFICANT CHANGE UP
-  TOBRAMYCIN: SIGNIFICANT CHANGE UP
-  TRIMETHOPRIM/SULFAMETHOXAZOLE: SIGNIFICANT CHANGE UP
ALBUMIN SERPL ELPH-MCNC: 2.6 G/DL — LOW (ref 3.3–5)
ALP SERPL-CCNC: 98 U/L — SIGNIFICANT CHANGE UP (ref 40–120)
ALT FLD-CCNC: 15 U/L — SIGNIFICANT CHANGE UP (ref 10–45)
ANION GAP SERPL CALC-SCNC: 15 MMOL/L — SIGNIFICANT CHANGE UP (ref 5–17)
AST SERPL-CCNC: 14 U/L — SIGNIFICANT CHANGE UP (ref 10–40)
BILIRUB SERPL-MCNC: 0.7 MG/DL — SIGNIFICANT CHANGE UP (ref 0.2–1.2)
BLD GP AB SCN SERPL QL: NEGATIVE — SIGNIFICANT CHANGE UP
BUN SERPL-MCNC: 113 MG/DL — HIGH (ref 7–23)
CALCIUM SERPL-MCNC: 8.9 MG/DL — SIGNIFICANT CHANGE UP (ref 8.4–10.5)
CHLORIDE SERPL-SCNC: 104 MMOL/L — SIGNIFICANT CHANGE UP (ref 96–108)
CO2 SERPL-SCNC: 21 MMOL/L — LOW (ref 22–31)
CREAT SERPL-MCNC: 5.06 MG/DL — HIGH (ref 0.5–1.3)
CULTURE RESULTS: ABNORMAL
EGFR: 12 ML/MIN/1.73M2 — LOW
EGFR: 12 ML/MIN/1.73M2 — LOW
GLUCOSE SERPL-MCNC: 114 MG/DL — HIGH (ref 70–99)
HCT VFR BLD CALC: 24.9 % — LOW (ref 39–50)
HGB BLD-MCNC: 7.8 G/DL — LOW (ref 13–17)
MAGNESIUM SERPL-MCNC: 1.9 MG/DL — SIGNIFICANT CHANGE UP (ref 1.6–2.6)
MCHC RBC-ENTMCNC: 29.1 PG — SIGNIFICANT CHANGE UP (ref 27–34)
MCHC RBC-ENTMCNC: 31.3 G/DL — LOW (ref 32–36)
MCV RBC AUTO: 92.9 FL — SIGNIFICANT CHANGE UP (ref 80–100)
METHOD TYPE: SIGNIFICANT CHANGE UP
NRBC BLD AUTO-RTO: 0 /100 WBCS — SIGNIFICANT CHANGE UP (ref 0–0)
ORGANISM # SPEC MICROSCOPIC CNT: ABNORMAL
ORGANISM # SPEC MICROSCOPIC CNT: ABNORMAL
PHOSPHATE SERPL-MCNC: 5.6 MG/DL — HIGH (ref 2.5–4.5)
PLATELET # BLD AUTO: 177 K/UL — SIGNIFICANT CHANGE UP (ref 150–400)
POTASSIUM SERPL-MCNC: 4 MMOL/L — SIGNIFICANT CHANGE UP (ref 3.5–5.3)
POTASSIUM SERPL-SCNC: 4 MMOL/L — SIGNIFICANT CHANGE UP (ref 3.5–5.3)
PROT SERPL-MCNC: 5.9 G/DL — LOW (ref 6–8.3)
RBC # BLD: 2.68 M/UL — LOW (ref 4.2–5.8)
RBC # FLD: 14.6 % — HIGH (ref 10.3–14.5)
RH IG SCN BLD-IMP: POSITIVE — SIGNIFICANT CHANGE UP
SODIUM SERPL-SCNC: 140 MMOL/L — SIGNIFICANT CHANGE UP (ref 135–145)
SPECIMEN SOURCE: SIGNIFICANT CHANGE UP
WBC # BLD: 10.32 K/UL — SIGNIFICANT CHANGE UP (ref 3.8–10.5)
WBC # FLD AUTO: 10.32 K/UL — SIGNIFICANT CHANGE UP (ref 3.8–10.5)

## 2025-05-07 PROCEDURE — 99232 SBSQ HOSP IP/OBS MODERATE 35: CPT | Mod: GC

## 2025-05-07 RX ORDER — TAMSULOSIN HYDROCHLORIDE 0.4 MG/1
0.4 CAPSULE ORAL AT BEDTIME
Refills: 0 | Status: DISCONTINUED | OUTPATIENT
Start: 2025-05-07 | End: 2025-05-14

## 2025-05-07 RX ADMIN — Medication 1 TABLET(S): at 11:39

## 2025-05-07 RX ADMIN — Medication 1300 MILLIGRAM(S): at 13:14

## 2025-05-07 RX ADMIN — METOPROLOL SUCCINATE 100 MILLIGRAM(S): 50 TABLET, EXTENDED RELEASE ORAL at 06:03

## 2025-05-07 RX ADMIN — Medication 2 MILLIGRAM(S): at 16:01

## 2025-05-07 RX ADMIN — SEVELAMER HYDROCHLORIDE 800 MILLIGRAM(S): 800 TABLET ORAL at 09:56

## 2025-05-07 RX ADMIN — TRAMADOL HYDROCHLORIDE 50 MILLIGRAM(S): 50 TABLET, FILM COATED ORAL at 23:10

## 2025-05-07 RX ADMIN — AZTREONAM 50 MILLIGRAM(S): 2 INJECTION, POWDER, LYOPHILIZED, FOR SOLUTION INTRAMUSCULAR; INTRAVENOUS at 17:21

## 2025-05-07 RX ADMIN — Medication 25 MILLIGRAM(S): at 06:03

## 2025-05-07 RX ADMIN — Medication 2 MILLIGRAM(S): at 16:34

## 2025-05-07 RX ADMIN — Medication 1300 MILLIGRAM(S): at 06:03

## 2025-05-07 RX ADMIN — Medication 25 MILLIGRAM(S): at 13:14

## 2025-05-07 RX ADMIN — SEVELAMER HYDROCHLORIDE 800 MILLIGRAM(S): 800 TABLET ORAL at 17:22

## 2025-05-07 RX ADMIN — Medication 2 MILLIGRAM(S): at 07:00

## 2025-05-07 RX ADMIN — APIXABAN 5 MILLIGRAM(S): 2.5 TABLET, FILM COATED ORAL at 17:22

## 2025-05-07 RX ADMIN — TRAMADOL HYDROCHLORIDE 50 MILLIGRAM(S): 50 TABLET, FILM COATED ORAL at 23:40

## 2025-05-07 RX ADMIN — AZTREONAM 50 MILLIGRAM(S): 2 INJECTION, POWDER, LYOPHILIZED, FOR SOLUTION INTRAMUSCULAR; INTRAVENOUS at 06:03

## 2025-05-07 RX ADMIN — Medication 325 MILLIGRAM(S): at 11:39

## 2025-05-07 RX ADMIN — TAMSULOSIN HYDROCHLORIDE 0.4 MILLIGRAM(S): 0.4 CAPSULE ORAL at 21:50

## 2025-05-07 RX ADMIN — Medication 25 MILLIGRAM(S): at 21:50

## 2025-05-07 RX ADMIN — Medication 1300 MILLIGRAM(S): at 21:50

## 2025-05-07 RX ADMIN — APIXABAN 5 MILLIGRAM(S): 2.5 TABLET, FILM COATED ORAL at 06:03

## 2025-05-07 RX ADMIN — Medication 2 MILLIGRAM(S): at 06:02

## 2025-05-07 NOTE — PROGRESS NOTE ADULT - ASSESSMENT
65 YO M w/ PMH of DM2 (on Januvia), HTN, HLD, gout, A-fib (on Eliquis), CKD not on dialysis (last documented Cr 2.82 on 4/5/25), severe OA, prostate cancer (s/p radiation), recent admission for fall discharged to [facility], admitted for elevated sCr s/p 2.7L removal and Woods placement - likely post-renal DEEDEE, course c/b UTI 63 YO M w/ PMH of DM2 (on Januvia), HTN, HLD, gout, A-fib (on Eliquis), CKD not on dialysis (last documented Cr 2.82 on 4/5/25), severe OA, prostate cancer (s/p radiation), recent admission for fall discharged to [facility], admitted for elevated sCr s/p 2.7L removal and Woods placement - suspected post-renal DEEDEE, course c/b UTI

## 2025-05-07 NOTE — PROGRESS NOTE ADULT - PROBLEM SELECTOR PLAN 1
- Per nephro note in prior hospitalization: unclear if DEEDEE on CKD vs slowly progressive CKD.   - 5/1/25 Cr 5.85 at admission. (In prior hospitalization, Cr 3.9 on admission. Per Dr. Woo, baseline Cr is 2.5)  - Nephrology consulted - Dr. Hilton May (Nephrologist at [facility]): would ideally like to defer HD for now  - Was previously followed by nephrologist (Dr Mauricio Cortes) but hasn't seen for last couple of years.  - 4/25/25 Renal US: multiple bilateral renal cysts, which are increased in size and number compared to the prior CT scan - c/f neoplasm  - Urine Lytes (05/02): FeNa 4.2% suggestive of intrinsic disease  - s/p maldonado catheter placement in ER with 2.7L fluid removed  - Ddx: Likely DEEDEE on CKD 2/2 post-renal DEEDEE s/p 2.7L fluid removed on maldonado catheter; now possibly also with component of pre-renal DEEDEE due to large volume UOP after maldonado placement, which may explain only slight decrease in Cr. A more notable improvement in Cr would have been expected after maldonado placement for a true post-renal DEEDEE    > c/w indwelling maldonado (05/01-  > daily BMP/Mg/Phos  > increase bicarb to 1300mg PO TID (up-titrated 05/05)  > C/w Sevelamer 800 mg BID; d/c once phos normalizes   > c/w strict intake/output Q6H  > c/w NS 0.45% at 100 cc/hr if UOP continues to remain >125 cc/hr to minimize risk of hypovolemia-associated complications from post-obstructive complications (up-titrated 75cc->100cc on 05/04)  > f/u Nephrology recommendations - Per nephro note in prior hospitalization: unclear if DEEDEE on CKD vs slowly progressive CKD.   - 5/1/25 Cr 5.85 at admission. (In prior hospitalization, Cr 3.9 on admission. Per Dr. Woo, baseline Cr is 2.5)  - Nephrology consulted - Dr. Hilton May (Nephrologist at [facility]): would ideally like to defer HD for now  - Was previously followed by nephrologist (Dr Mauricio Cortes) but hasn't seen for last couple of years.  - 4/25/25 Renal US: multiple bilateral renal cysts, which are increased in size and number compared to the prior CT scan - c/f neoplasm  - Urine Lytes (05/02): FeNa 4.2% suggestive of intrinsic disease  - s/p maldonado catheter placement in ER with 2.7L fluid removed  - Ddx: Likely DEEDEE on CKD 2/2 post-renal DEEDEE s/p 2.7L fluid removed on maldonado catheter; now possibly also with component of pre-renal DEEDEE due to large volume UOP after maldonado placement, which may explain only slight decrease in Cr. A more notable improvement in Cr would have been expected after maldonado placement for a true post-renal DEEDEE    > c/w indwelling maldonado (05/01-  > daily BMP/Mg/Phos  > increase bicarb to 1300mg PO TID (up-titrated 05/05)  > C/w Sevelamer 800 mg BID; d/c once phos normalizes   > c/w strict intake/output Q6H  > s/p IVF  > f/u Nephrology recommendations

## 2025-05-07 NOTE — PROGRESS NOTE ADULT - SUBJECTIVE AND OBJECTIVE BOX
Patient is a 64y old  Male who presents with a chief complaint of DEEDEE on CKD, normocytic anemia (06 May 2025 08:02)      INTERVAL HX:  No acute overnight events. Pt seen and examined at bedside. ROS otherwise negative   Allergies:  penicillin (Unknown)  clindamycin (Rash)    Medications:  acetaminophen     Tablet .. 650 milliGRAM(s) Oral every 6 hours PRN  apixaban 5 milliGRAM(s) Oral two times a day  aztreonam  IVPB      aztreonam  IVPB 1000 milliGRAM(s) IV Intermittent every 12 hours  ferrous    sulfate 325 milliGRAM(s) Oral daily  hydrALAZINE 25 milliGRAM(s) Oral every 8 hours  HYDROmorphone   Tablet 2 milliGRAM(s) Oral every 6 hours PRN  lactulose Syrup 10 Gram(s) Oral once PRN  lidocaine   4% Patch 1 Patch Transdermal daily PRN  methocarbamol 750 milliGRAM(s) Oral every 12 hours PRN  metoprolol succinate  milliGRAM(s) Oral daily  Nephro-shnanon 1 Tablet(s) Oral daily  polyethylene glycol 3350 17 Gram(s) Oral two times a day  senna 2 Tablet(s) Oral at bedtime  sevelamer carbonate 800 milliGRAM(s) Oral two times a day with meals  sodium bicarbonate 1300 milliGRAM(s) Oral three times a day  traMADol 50 milliGRAM(s) Oral every 12 hours PRN    Vitals:  T(C): 36.4 (05-07-25 @ 05:01), Max: 38.8 (05-06-25 @ 19:14)  HR: 112 (05-07-25 @ 05:01) (98 - 125)  BP: 117/89 (05-07-25 @ 05:01) (117/89 - 151/77)  RR: 18 (05-07-25 @ 05:01) (18 - 18)  SpO2: 98% (05-07-25 @ 05:01) (97% - 98%)  I/O's:    05-06-25 @ 07:01  -  05-07-25 @ 07:00  --------------------------------------------------------  IN: 320 mL / OUT: 3075 mL / NET: -2755 mL      Physical Exam:    Labs:                        7.9    11.92 )-----------( 171      ( 06 May 2025 06:58 )             25.1     05-06    142  |  106  |  111[H]  ----------------------------<  102[H]  3.9   |  18[L]  |  4.91[H]    Ca    8.6      06 May 2025 06:58  Phos  5.4     05-06  Mg     1.9     05-06    TPro  5.9[L]  /  Alb  2.5[L]  /  TBili  0.8  /  DBili  x   /  AST  20  /  ALT  15  /  AlkPhos  87  05-06        Radiology/Procedures: Reviewed.   Patient is a 64y old  Male who presents with a chief complaint of DEEDEE on CKD, normocytic anemia (06 May 2025 08:02)      INTERVAL HX:  No acute overnight events. Intermittent tachycardic. Pt seen and examined at bedside, other than ongoing knee pain denies acute complaints. Denies CP, SOB, abdominal pain, dysuria, diarrhea, subjective fevers/chills. Reviewed plan of care, questions answered.    Allergies:  penicillin (Unknown)  clindamycin (Rash)    Medications:  acetaminophen     Tablet .. 650 milliGRAM(s) Oral every 6 hours PRN  apixaban 5 milliGRAM(s) Oral two times a day  aztreonam  IVPB      aztreonam  IVPB 1000 milliGRAM(s) IV Intermittent every 12 hours  ferrous    sulfate 325 milliGRAM(s) Oral daily  hydrALAZINE 25 milliGRAM(s) Oral every 8 hours  HYDROmorphone   Tablet 2 milliGRAM(s) Oral every 6 hours PRN  lactulose Syrup 10 Gram(s) Oral once PRN  lidocaine   4% Patch 1 Patch Transdermal daily PRN  methocarbamol 750 milliGRAM(s) Oral every 12 hours PRN  metoprolol succinate  milliGRAM(s) Oral daily  Nephro-shannon 1 Tablet(s) Oral daily  polyethylene glycol 3350 17 Gram(s) Oral two times a day  senna 2 Tablet(s) Oral at bedtime  sevelamer carbonate 800 milliGRAM(s) Oral two times a day with meals  sodium bicarbonate 1300 milliGRAM(s) Oral three times a day  traMADol 50 milliGRAM(s) Oral every 12 hours PRN    Vitals:  T(C): 36.4 (05-07-25 @ 05:01), Max: 38.8 (05-06-25 @ 19:14)  HR: 112 (05-07-25 @ 05:01) (98 - 125)  BP: 117/89 (05-07-25 @ 05:01) (117/89 - 151/77)  RR: 18 (05-07-25 @ 05:01) (18 - 18)  SpO2: 98% (05-07-25 @ 05:01) (97% - 98%)  I/O's:    05-06-25 @ 07:01  -  05-07-25 @ 07:00  --------------------------------------------------------  IN: 320 mL / OUT: 3075 mL / NET: -2755 mL      Physical Exam:  GENERAL: NAD, resting in bed  HEAD: normocephalic, atraumatic  HEENT: normal conjunctiva, oral mucosa moist, uvula midline, no tonsilar exudates, no JVD  CARDIAC: regular rate and rhythm, normal S1S2  PULM: normal breath sounds, clear to ascultation bilaterally, no rales, rhonchi, wheezing  GI: Abd soft, nondistended, nontender, no rebound tenderness, no guarding, no rigidity  : +Woods with cloudy urine  NEURO: no focal motor or sensory deficits, AAOx3  MSK: ROM intact, no peripheral edema, no calf tenderness b/l  SKIN: well-perfused, extremities warm, no visible rashes      Labs:                        7.9    11.92 )-----------( 171      ( 06 May 2025 06:58 )             25.1     05-06    142  |  106  |  111[H]  ----------------------------<  102[H]  3.9   |  18[L]  |  4.91[H]    Ca    8.6      06 May 2025 06:58  Phos  5.4     05-06  Mg     1.9     05-06    TPro  5.9[L]  /  Alb  2.5[L]  /  TBili  0.8  /  DBili  x   /  AST  20  /  ALT  15  /  AlkPhos  87  05-06        Radiology/Procedures: Reviewed.   Patient is a 64y old  Male who presents with a chief complaint of DEEDEE on CKD, normocytic anemia (06 May 2025 08:02)      INTERVAL HX:  No acute overnight events. Intermittent tachycardic. Pt seen and examined at bedside, other than ongoing knee pain denies acute complaints. Denies CP, SOB, abdominal pain, dysuria, diarrhea, subjective fevers/chills. Reviewed plan of care, questions answered.    Allergies:  penicillin (Unknown)  clindamycin (Rash)    Medications:  acetaminophen     Tablet .. 650 milliGRAM(s) Oral every 6 hours PRN  apixaban 5 milliGRAM(s) Oral two times a day  aztreonam  IVPB      aztreonam  IVPB 1000 milliGRAM(s) IV Intermittent every 12 hours  ferrous    sulfate 325 milliGRAM(s) Oral daily  hydrALAZINE 25 milliGRAM(s) Oral every 8 hours  HYDROmorphone   Tablet 2 milliGRAM(s) Oral every 6 hours PRN  lactulose Syrup 10 Gram(s) Oral once PRN  lidocaine   4% Patch 1 Patch Transdermal daily PRN  methocarbamol 750 milliGRAM(s) Oral every 12 hours PRN  metoprolol succinate  milliGRAM(s) Oral daily  Nephro-shannon 1 Tablet(s) Oral daily  polyethylene glycol 3350 17 Gram(s) Oral two times a day  senna 2 Tablet(s) Oral at bedtime  sevelamer carbonate 800 milliGRAM(s) Oral two times a day with meals  sodium bicarbonate 1300 milliGRAM(s) Oral three times a day  traMADol 50 milliGRAM(s) Oral every 12 hours PRN    Vitals:  T(C): 36.4 (05-07-25 @ 05:01), Max: 38.8 (05-06-25 @ 19:14)  HR: 112 (05-07-25 @ 05:01) (98 - 125)  BP: 117/89 (05-07-25 @ 05:01) (117/89 - 151/77)  RR: 18 (05-07-25 @ 05:01) (18 - 18)  SpO2: 98% (05-07-25 @ 05:01) (97% - 98%)  I/O's:    05-06-25 @ 07:01  -  05-07-25 @ 07:00  --------------------------------------------------------  IN: 320 mL / OUT: 3075 mL / NET: -2755 mL      Physical Exam:  GENERAL: NAD, resting in bed  HEAD: normocephalic, atraumatic  CARDIAC: regular rate and rhythm, normal S1S2  PULM: normal breath sounds, clear to ascultation bilaterally, no rales, rhonchi, wheezing  GI: Abd soft, nondistended, nontender, no rebound tenderness, no guarding, no rigidity  : +Woods with cloudy urine  NEURO: no focal motor or sensory deficits, AAOx3  MSK - no edema         Labs:                        7.9    11.92 )-----------( 171      ( 06 May 2025 06:58 )             25.1     05-06    142  |  106  |  111[H]  ----------------------------<  102[H]  3.9   |  18[L]  |  4.91[H]    Ca    8.6      06 May 2025 06:58  Phos  5.4     05-06  Mg     1.9     05-06    TPro  5.9[L]  /  Alb  2.5[L]  /  TBili  0.8  /  DBili  x   /  AST  20  /  ALT  15  /  AlkPhos  87  05-06        Radiology/Procedures: Reviewed.

## 2025-05-07 NOTE — PROGRESS NOTE ADULT - PROBLEM SELECTOR PLAN 2
Febrile overnight 5/4, TMax 101.2. No complaint of dysuria but cloudy urine noted in Woods bag, UA grossly (+). RVP negative, CXR clr.    - S/p Vancomycin 750mg 5/5  - 5/6 will start Aztreonam (penicillin allergy)  - F/u urine cx, blood cx Febrile overnight 5/4, TMax 101.2. No complaint of dysuria but cloudy urine noted in Woods bag, UA grossly (+). RVP negative, CXR clr.    - S/p Vancomycin 750mg 5/5  - C/w Aztreonam, plan for 5d course (5/6-5/10) (penicillin allergy)  - Urine culture with pan-sensitive E. coli  - Blood Cx NG @ 24h

## 2025-05-07 NOTE — PROGRESS NOTE ADULT - PROBLEM SELECTOR PLAN 9
- Prostate CA s/p radiation; unclear if this is contributing to urinary obstruction and retention   - Home Myrbetriq 50mg daily    > f/u Outpatient  - Holding Myrebetiq for now - Prostate CA s/p radiation; unclear if this is contributing to urinary obstruction and retention   - Home Myrbetriq 50mg daily    > f/u Outpatient  - Holding Myrebetiq for now - will dc upon discharge as likely contributing to significant retention on admission  - Also w/ reported history BPH, will start Flomax 0.4mg qhs

## 2025-05-07 NOTE — PROGRESS NOTE ADULT - PROBLEM SELECTOR PLAN 4
- Hgb trend: 10.7 (4/5) -> 8.6 (4/14) -> 8.5 (05/01)  - Pt denies melena or hematochezia  - Home ferrous sulfate 325 mg (65mg iron) daily  - Iron studies c/w AOCD  - Reticulocyte index: 0.5, likely 2/2 CKD   - Possibly progressive ACD d/t CKD.    > Hgb = 7 5/5, 1/2u pRBC x2 ordered, Retacrit 10k SQ x1 ordered  > Trend CBC  > Maintain active T&S (last 05/04)  > c/w home ferrous sulfate 325mg PO QD

## 2025-05-07 NOTE — PROGRESS NOTE ADULT - SUBJECTIVE AND OBJECTIVE BOX
Overnight events noted      VITAL:  T(C): , Max: 38.8 (05-06-25 @ 19:14)  T(F): , Max: 101.9 (05-06-25 @ 19:14)  HR: 112 (05-07-25 @ 05:01)  BP: 117/89 (05-07-25 @ 05:01)  RR: 18 (05-07-25 @ 05:01)  SpO2: 98% (05-07-25 @ 05:01)  Urine output 3075cc/24h      PHYSICAL EXAM:  Constitutional: NAD, Alert  HEENT: NCAT, MMM  Neck: Supple, No JVD  Respiratory: CTA-b/l  Cardiovascular: tachy reg s1s2  Gastrointestinal: BS+, soft, NT/ND  : (+)maldonado  Extremities: No peripheral edema b/l  Neurological: reduced generalized strength  Back: no CVAT b/l  Skin: No rashes, no nevi    LABS:                        7.9    11.92 )-----------( 171      ( 06 May 2025 06:58 )             25.1     Na(142)/K(3.9)/Cl(106)/HCO3(18)/BUN(111)/Cr(4.91)Glu(102)/Ca(8.6)/Mg(1.9)/PO4(5.4)    05-06 @ 06:58  Na(140)/K(3.9)/Cl(107)/HCO3(17)/BUN(103)/Cr(5.12)Glu(102)/Ca(8.4)/Mg(1.8)/PO4(5.2)    05-05 @ 06:43      IMPRESSION: 64M w/ HTN, gout, AFib, and CKD4-5, 5/1/25 p/w DEEDEE on CKD  (1)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  (2)DEEDEE - obstructive - steadily improving, s/p maldonado placement  (3)Metabolic acidosis - renally mediated - slowly improving, on PO NaHCO3 and with improvement in the DEEDEE  (4)Hyperphosphatemia -on Renvela with meals  (5)CV - acceptable BP/volume  (6)Anemia - s/p PRBCs/Retacrit  (7)ID - UTI - on IV Azactam      RECOMMEND:  (1)NaHCO3 and Renvela as ordered  (2)Continue abx for GFR<15  (3)BMP+Mg+PO4 daily while admitted        Hilton May MD  [facility]  Office/on call physician: [phone]  Cell (7a-7p): [phone]       No pain, no sob      VITAL:  T(C): , Max: 38.8 (05-06-25 @ 19:14)  T(F): , Max: 101.9 (05-06-25 @ 19:14)  HR: 112 (05-07-25 @ 05:01)  BP: 117/89 (05-07-25 @ 05:01)  RR: 18 (05-07-25 @ 05:01)  SpO2: 98% (05-07-25 @ 05:01)  Urine output 3075cc/24h      PHYSICAL EXAM:  Constitutional: NAD, Alert  HEENT: NCAT, MMM  Neck: Supple, No JVD  Respiratory: CTA-b/l  Cardiovascular: tachy reg s1s2  Gastrointestinal: BS+, soft, NT/ND  : (+)maldonado  Extremities: No peripheral edema b/l  Neurological: reduced generalized strength  Back: no CVAT b/l  Skin: No rashes, no nevi    LABS:                        7.9    11.92 )-----------( 171      ( 06 May 2025 06:58 )             25.1     Na(142)/K(3.9)/Cl(106)/HCO3(18)/BUN(111)/Cr(4.91)Glu(102)/Ca(8.6)/Mg(1.9)/PO4(5.4)    05-06 @ 06:58  Na(140)/K(3.9)/Cl(107)/HCO3(17)/BUN(103)/Cr(5.12)Glu(102)/Ca(8.4)/Mg(1.8)/PO4(5.2)    05-05 @ 06:43      IMPRESSION: 64M w/ HTN, gout, AFib, and CKD4-5, 5/1/25 p/w DEEDEE on CKD  (1)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  (2)DEEDEE - obstructive - steadily improving, s/p maldonado placement  (3)Metabolic acidosis - renally mediated - slowly improving, on PO NaHCO3 and with improvement in the DEEDEE  (4)Hyperphosphatemia -on Renvela with meals  (5)CV - acceptable BP/volume  (6)Anemia - s/p PRBCs/Retacrit  (7)ID - UTI - on IV Azactam      RECOMMEND:  (1)NaHCO3 and Renvela as ordered  (2)Continue abx for GFR<15  (3)BMP+Mg+PO4 daily while admitted        Hilton May MD  [facility]  Office/on call physician: [phone]  Cell (7a-7p): [phone]

## 2025-05-07 NOTE — PROGRESS NOTE ADULT - PROBLEM SELECTOR PLAN 10
-DVT Ppx: c/w Eliquis  -Diet: CC/nephro diet  -Fall Precautions  -Dispo: Pending resolution of DEEDEE  -Wound Care d/c recs: "Pt will need Group 2 mattress on hospital bed and ROHO cushion for wheel chair upon discharge home"  -PT eval: likely back to [facility] -DVT Ppx: c/w Eliquis  -Diet: CC/nephro diet  -Fall Precautions  -Dispo: Pending improvement in Cr   -Wound Care d/c recs: "Pt will need Group 2 mattress on hospital bed and ROHO cushion for wheel chair upon discharge home"  -PT eval: likely back to [facility]

## 2025-05-08 LAB
ALBUMIN SERPL ELPH-MCNC: 2.4 G/DL — LOW (ref 3.3–5)
ALP SERPL-CCNC: 125 U/L — HIGH (ref 40–120)
ALT FLD-CCNC: 22 U/L — SIGNIFICANT CHANGE UP (ref 10–45)
ANION GAP SERPL CALC-SCNC: 18 MMOL/L — HIGH (ref 5–17)
AST SERPL-CCNC: 29 U/L — SIGNIFICANT CHANGE UP (ref 10–40)
BILIRUB SERPL-MCNC: 0.6 MG/DL — SIGNIFICANT CHANGE UP (ref 0.2–1.2)
BUN SERPL-MCNC: 94 MG/DL — HIGH (ref 7–23)
CALCIUM SERPL-MCNC: 8.7 MG/DL — SIGNIFICANT CHANGE UP (ref 8.4–10.5)
CHLORIDE SERPL-SCNC: 107 MMOL/L — SIGNIFICANT CHANGE UP (ref 96–108)
CO2 SERPL-SCNC: 18 MMOL/L — LOW (ref 22–31)
CREAT SERPL-MCNC: 4.84 MG/DL — HIGH (ref 0.5–1.3)
EGFR: 13 ML/MIN/1.73M2 — LOW
EGFR: 13 ML/MIN/1.73M2 — LOW
GLUCOSE BLDC GLUCOMTR-MCNC: 118 MG/DL — HIGH (ref 70–99)
GLUCOSE BLDC GLUCOMTR-MCNC: 119 MG/DL — HIGH (ref 70–99)
GLUCOSE BLDC GLUCOMTR-MCNC: 129 MG/DL — HIGH (ref 70–99)
GLUCOSE BLDC GLUCOMTR-MCNC: 138 MG/DL — HIGH (ref 70–99)
GLUCOSE SERPL-MCNC: 102 MG/DL — HIGH (ref 70–99)
HCT VFR BLD CALC: 23.3 % — LOW (ref 39–50)
HGB BLD-MCNC: 7.3 G/DL — LOW (ref 13–17)
MAGNESIUM SERPL-MCNC: 1.9 MG/DL — SIGNIFICANT CHANGE UP (ref 1.6–2.6)
MCHC RBC-ENTMCNC: 29.7 PG — SIGNIFICANT CHANGE UP (ref 27–34)
MCHC RBC-ENTMCNC: 31.3 G/DL — LOW (ref 32–36)
MCV RBC AUTO: 94.7 FL — SIGNIFICANT CHANGE UP (ref 80–100)
NRBC BLD AUTO-RTO: 0 /100 WBCS — SIGNIFICANT CHANGE UP (ref 0–0)
PHOSPHATE SERPL-MCNC: 5.1 MG/DL — HIGH (ref 2.5–4.5)
PLATELET # BLD AUTO: 182 K/UL — SIGNIFICANT CHANGE UP (ref 150–400)
POTASSIUM SERPL-MCNC: 3.8 MMOL/L — SIGNIFICANT CHANGE UP (ref 3.5–5.3)
POTASSIUM SERPL-SCNC: 3.8 MMOL/L — SIGNIFICANT CHANGE UP (ref 3.5–5.3)
PROT SERPL-MCNC: 5.7 G/DL — LOW (ref 6–8.3)
RBC # BLD: 2.46 M/UL — LOW (ref 4.2–5.8)
RBC # FLD: 14.6 % — HIGH (ref 10.3–14.5)
SODIUM SERPL-SCNC: 143 MMOL/L — SIGNIFICANT CHANGE UP (ref 135–145)
WBC # BLD: 9.92 K/UL — SIGNIFICANT CHANGE UP (ref 3.8–10.5)
WBC # FLD AUTO: 9.92 K/UL — SIGNIFICANT CHANGE UP (ref 3.8–10.5)

## 2025-05-08 PROCEDURE — 99232 SBSQ HOSP IP/OBS MODERATE 35: CPT | Mod: GC

## 2025-05-08 RX ADMIN — TAMSULOSIN HYDROCHLORIDE 0.4 MILLIGRAM(S): 0.4 CAPSULE ORAL at 21:32

## 2025-05-08 RX ADMIN — Medication 2 MILLIGRAM(S): at 12:11

## 2025-05-08 RX ADMIN — APIXABAN 5 MILLIGRAM(S): 2.5 TABLET, FILM COATED ORAL at 05:19

## 2025-05-08 RX ADMIN — Medication 650 MILLIGRAM(S): at 08:55

## 2025-05-08 RX ADMIN — Medication 25 MILLIGRAM(S): at 05:19

## 2025-05-08 RX ADMIN — Medication 1300 MILLIGRAM(S): at 21:32

## 2025-05-08 RX ADMIN — METOPROLOL SUCCINATE 100 MILLIGRAM(S): 50 TABLET, EXTENDED RELEASE ORAL at 05:19

## 2025-05-08 RX ADMIN — Medication 25 MILLIGRAM(S): at 13:56

## 2025-05-08 RX ADMIN — Medication 650 MILLIGRAM(S): at 21:32

## 2025-05-08 RX ADMIN — Medication 1 TABLET(S): at 12:11

## 2025-05-08 RX ADMIN — AZTREONAM 50 MILLIGRAM(S): 2 INJECTION, POWDER, LYOPHILIZED, FOR SOLUTION INTRAMUSCULAR; INTRAVENOUS at 17:21

## 2025-05-08 RX ADMIN — Medication 325 MILLIGRAM(S): at 12:11

## 2025-05-08 RX ADMIN — AZTREONAM 50 MILLIGRAM(S): 2 INJECTION, POWDER, LYOPHILIZED, FOR SOLUTION INTRAMUSCULAR; INTRAVENOUS at 05:17

## 2025-05-08 RX ADMIN — SEVELAMER HYDROCHLORIDE 800 MILLIGRAM(S): 800 TABLET ORAL at 17:21

## 2025-05-08 RX ADMIN — POLYETHYLENE GLYCOL 3350 17 GRAM(S): 17 POWDER, FOR SOLUTION ORAL at 17:24

## 2025-05-08 RX ADMIN — APIXABAN 5 MILLIGRAM(S): 2.5 TABLET, FILM COATED ORAL at 17:21

## 2025-05-08 RX ADMIN — Medication 1300 MILLIGRAM(S): at 05:19

## 2025-05-08 RX ADMIN — Medication 2 TABLET(S): at 21:33

## 2025-05-08 RX ADMIN — SEVELAMER HYDROCHLORIDE 800 MILLIGRAM(S): 800 TABLET ORAL at 08:55

## 2025-05-08 RX ADMIN — Medication 50 MILLILITER(S): at 16:30

## 2025-05-08 RX ADMIN — Medication 650 MILLIGRAM(S): at 22:00

## 2025-05-08 RX ADMIN — Medication 650 MILLIGRAM(S): at 09:30

## 2025-05-08 RX ADMIN — Medication 1300 MILLIGRAM(S): at 13:56

## 2025-05-08 RX ADMIN — Medication 25 MILLIGRAM(S): at 21:32

## 2025-05-08 NOTE — PROGRESS NOTE ADULT - PROBLEM SELECTOR PLAN 2
Febrile overnight 5/4, TMax 101.2. No complaint of dysuria but cloudy urine noted in Woods bag, UA grossly (+). RVP negative, CXR clr.    - S/p Vancomycin 750mg 5/5  - C/w Aztreonam, plan for 5d course (5/6-5/10) (penicillin allergy)  - Urine culture with pan-sensitive E. coli  - Blood Cx NG @ 24h Febrile overnight 5/4, TMax 101.2. No complaint of dysuria but cloudy urine noted in Woods bag, UA grossly (+). RVP negative, CXR clr.    - S/p Vancomycin 750mg 5/5  - C/w Aztreonam, plan for 5d course (5/6-5/10) (penicillin allergy)  - Urine culture with pan-sensitive E. coli  - Blood Cx NG @ 48h

## 2025-05-08 NOTE — PROGRESS NOTE ADULT - PROBLEM SELECTOR PLAN 3
- 4/25/25 Renal US: multiple bilateral renal cysts, which are increased in size and number compared to the prior CT scan - c/f neoplasm  - 05/02/25 CTAP w/o contrast: Limited exam without IV contrast unable to diagnostically evaluate the bilateral renal lesions of varying size and complexity. Recommend renal protocol MRI with IV contrast if clinically desired    > Plan for renal protocol MRI with IV contrast when kidney fx improves - 4/25/25 Renal US: multiple bilateral renal cysts, which are increased in size and number compared to the prior CT scan - c/f neoplasm  - 05/02/25 CTAP w/o contrast: Limited exam without IV contrast unable to diagnostically evaluate the bilateral renal lesions of varying size and complexity. Recommend renal protocol MRI with IV contrast if clinically desired    > Plan for renal protocol MRI with IV contrast when kidney fx improves.

## 2025-05-08 NOTE — PROGRESS NOTE ADULT - SUBJECTIVE AND OBJECTIVE BOX
Patient is a 64y old  Male who presents with a chief complaint of DEEDEE on CKD, normocytic anemia (07 May 2025 08:07)      INTERVAL HX:  No acute overnight events. Pt seen and examined at bedside. ROS otherwise negative     Allergies:  penicillin (Unknown)  clindamycin (Rash)    Medications:  acetaminophen     Tablet .. 650 milliGRAM(s) Oral every 6 hours PRN  apixaban 5 milliGRAM(s) Oral two times a day  aztreonam  IVPB      aztreonam  IVPB 1000 milliGRAM(s) IV Intermittent every 12 hours  ferrous    sulfate 325 milliGRAM(s) Oral daily  hydrALAZINE 25 milliGRAM(s) Oral every 8 hours  HYDROmorphone   Tablet 2 milliGRAM(s) Oral every 6 hours PRN  lactulose Syrup 10 Gram(s) Oral once PRN  lidocaine   4% Patch 1 Patch Transdermal daily PRN  methocarbamol 750 milliGRAM(s) Oral every 12 hours PRN  metoprolol succinate  milliGRAM(s) Oral daily  Nephro-shannon 1 Tablet(s) Oral daily  polyethylene glycol 3350 17 Gram(s) Oral two times a day  senna 2 Tablet(s) Oral at bedtime  sevelamer carbonate 800 milliGRAM(s) Oral two times a day with meals  sodium bicarbonate 1300 milliGRAM(s) Oral three times a day  tamsulosin 0.4 milliGRAM(s) Oral at bedtime  traMADol 50 milliGRAM(s) Oral every 12 hours PRN    Vitals:  T(C): 37 (05-08-25 @ 05:14), Max: 37 (05-07-25 @ 20:05)  HR: 110 (05-08-25 @ 05:14) (96 - 110)  BP: 114/75 (05-08-25 @ 05:14) (114/75 - 118/82)  RR: 18 (05-08-25 @ 05:14) (18 - 18)  SpO2: 98% (05-08-25 @ 05:14) (97% - 98%)  I/O's:    05-07-25 @ 07:01  -  05-08-25 @ 07:00  --------------------------------------------------------  IN: 0 mL / OUT: 2800 mL / NET: -2800 mL      Physical Exam:    Labs:                        7.8    10.32 )-----------( 177      ( 07 May 2025 08:45 )             24.9     05-07    140  |  104  |  113[H]  ----------------------------<  114[H]  4.0   |  21[L]  |  5.06[H]    Ca    8.9      07 May 2025 08:45  Phos  5.6     05-07  Mg     1.9     05-07    TPro  5.9[L]  /  Alb  2.6[L]  /  TBili  0.7  /  DBili  x   /  AST  14  /  ALT  15  /  AlkPhos  98  05-07        Radiology/Procedures: Reviewed.   Patient is a 64y old  Male who presents with a chief complaint of DEEDEE on CKD, normocytic anemia (07 May 2025 08:07)      INTERVAL HX:  No acute overnight events. Pt seen and examined at bedside. States he is feeling thirsty. Denies chest pain, SOB, abd pain, dysuria, subjective fevers/chills, n/v. Remains tachycardic.    Allergies:  penicillin (Unknown)  clindamycin (Rash)    Medications:  acetaminophen     Tablet .. 650 milliGRAM(s) Oral every 6 hours PRN  apixaban 5 milliGRAM(s) Oral two times a day  aztreonam  IVPB      aztreonam  IVPB 1000 milliGRAM(s) IV Intermittent every 12 hours  ferrous    sulfate 325 milliGRAM(s) Oral daily  hydrALAZINE 25 milliGRAM(s) Oral every 8 hours  HYDROmorphone   Tablet 2 milliGRAM(s) Oral every 6 hours PRN  lactulose Syrup 10 Gram(s) Oral once PRN  lidocaine   4% Patch 1 Patch Transdermal daily PRN  methocarbamol 750 milliGRAM(s) Oral every 12 hours PRN  metoprolol succinate  milliGRAM(s) Oral daily  Nephro-shannon 1 Tablet(s) Oral daily  polyethylene glycol 3350 17 Gram(s) Oral two times a day  senna 2 Tablet(s) Oral at bedtime  sevelamer carbonate 800 milliGRAM(s) Oral two times a day with meals  sodium bicarbonate 1300 milliGRAM(s) Oral three times a day  tamsulosin 0.4 milliGRAM(s) Oral at bedtime  traMADol 50 milliGRAM(s) Oral every 12 hours PRN    Vitals:  T(C): 37 (05-08-25 @ 05:14), Max: 37 (05-07-25 @ 20:05)  HR: 110 (05-08-25 @ 05:14) (96 - 110)  BP: 114/75 (05-08-25 @ 05:14) (114/75 - 118/82)  RR: 18 (05-08-25 @ 05:14) (18 - 18)  SpO2: 98% (05-08-25 @ 05:14) (97% - 98%)  I/O's:    05-07-25 @ 07:01  -  05-08-25 @ 07:00  --------------------------------------------------------  IN: 0 mL / OUT: 2800 mL / NET: -2800 mL      Physical Exam:  GENERAL: NAD, resting in bed  HEAD: normocephalic, atraumatic  CARDIAC: regular rate and rhythm, normal S1S2  PULM: normal breath sounds, clear to ascultation bilaterally, no rales, rhonchi, wheezing  GI: Abd soft, nondistended, nontender, no rebound tenderness, no guarding, no rigidity  : +Woods with cloudy urine  NEURO: no focal motor or sensory deficits, AAOx3  MSK - trace edema     Labs:                        7.8    10.32 )-----------( 177      ( 07 May 2025 08:45 )             24.9     05-07    140  |  104  |  113[H]  ----------------------------<  114[H]  4.0   |  21[L]  |  5.06[H]    Ca    8.9      07 May 2025 08:45  Phos  5.6     05-07  Mg     1.9     05-07    TPro  5.9[L]  /  Alb  2.6[L]  /  TBili  0.7  /  DBili  x   /  AST  14  /  ALT  15  /  AlkPhos  98  05-07        Radiology/Procedures: Reviewed.

## 2025-05-08 NOTE — PROGRESS NOTE ADULT - PROBLEM SELECTOR PLAN 6
- A1c: 4.6 4/3/2025  - Home Januvia 50 mg daily    > c/w ISS  > c/w POCT Blood Glucose pre-meal and at bedtime.  > Hold home Januvia, likely will discontinue upon d/c - A1c: 4.6 4/3/2025  - Home Januvia 50 mg daily    > blood glucose well controlled while inpatient, monitor pre-meal FS  > Hold home Januvia, likely will discontinue upon d/c

## 2025-05-08 NOTE — PROGRESS NOTE ADULT - ASSESSMENT
63 YO M w/ PMH of DM2 (on Januvia), HTN, HLD, gout, A-fib (on Eliquis), CKD not on dialysis (last documented Cr 2.82 on 4/5/25), severe OA, prostate cancer (s/p radiation), recent admission for fall discharged to [facility], admitted for elevated sCr s/p 2.7L removal and Woods placement - suspected post-renal DEEDEE, course c/b UTI

## 2025-05-08 NOTE — PROGRESS NOTE ADULT - PROBLEM SELECTOR PLAN 1
- Per nephro note in prior hospitalization: unclear if DEEDEE on CKD vs slowly progressive CKD.   - 5/1/25 Cr 5.85 at admission. (In prior hospitalization, Cr 3.9 on admission. Per Dr. Woo, baseline Cr is 2.5)  - Nephrology consulted - Dr. Hilton May (Nephrologist at [facility]): would ideally like to defer HD for now  - Was previously followed by nephrologist (Dr Mauricio Cortes) but hasn't seen for last couple of years.  - 4/25/25 Renal US: multiple bilateral renal cysts, which are increased in size and number compared to the prior CT scan - c/f neoplasm  - Urine Lytes (05/02): FeNa 4.2% suggestive of intrinsic disease  - s/p maldonado catheter placement in ER with 2.7L fluid removed  - Ddx: Likely DEEDEE on CKD 2/2 post-renal DEEDEE s/p 2.7L fluid removed on maldonado catheter; now possibly also with component of pre-renal DEEDEE due to large volume UOP after maldonado placement, which may explain only slight decrease in Cr. A more notable improvement in Cr would have been expected after maldonado placement for a true post-renal DEEDEE    > c/w indwelling maldonado (05/01-  > daily BMP/Mg/Phos  > increase bicarb to 1300mg PO TID (up-titrated 05/05)  > C/w Sevelamer 800 mg BID; d/c once phos normalizes   > c/w strict intake/output Q6H  > s/p IVF  > f/u Nephrology recommendations - Per nephro note in prior hospitalization: unclear if DEEDEE on CKD vs slowly progressive CKD.   - 5/1/25 Cr 5.85 at admission. (In prior hospitalization, Cr 3.9 on admission. Per Dr. Woo, baseline Cr is 2.5)  - Nephrology consulted - Dr. Hilton May (Nephrologist at [facility]): would ideally like to defer HD for now  - Was previously followed by nephrologist (Dr Mauricio Cortes) but hasn't seen for last couple of years.  - 4/25/25 Renal US: multiple bilateral renal cysts, which are increased in size and number compared to the prior CT scan - c/f neoplasm  - Urine Lytes (05/02): FeNa 4.2% suggestive of intrinsic disease  - s/p maldonado catheter placement in ER with 2.7L fluid removed  - Ddx: Likely DEEDEE on CKD 2/2 post-renal DEEDEE s/p 2.7L fluid removed on maldonado catheter; now possibly also with component of pre-renal DEEDEE due to large volume UOP after maldonado placement, which may explain only slight decrease in Cr. A more notable improvement in Cr would have been expected after maldonado placement for a true post-renal DEEDEE    > c/w indwelling maldonado (05/01-  > daily BMP/Mg/Phos  > increase bicarb to 1300mg PO TID (up-titrated 05/05)  > C/w Sevelamer 800 mg BID; d/c once phos normalizes   > c/w strict intake/output Q6H  > s/p IVF, will d/w nephrology today regarding further IVF as pt appears dry  > f/u Nephrology recommendations  > Eventual TOV

## 2025-05-08 NOTE — PROGRESS NOTE ADULT - PROBLEM SELECTOR PLAN 10
-DVT Ppx: c/w Eliquis  -Diet: CC/nephro diet  -Fall Precautions  -Dispo: Pending improvement in Cr   -Wound Care d/c recs: "Pt will need Group 2 mattress on hospital bed and ROHO cushion for wheel chair upon discharge home"  -PT eval: likely back to [facility]

## 2025-05-08 NOTE — PROVIDER CONTACT NOTE (OTHER) - SITUATION
Patient has a fever of 100.9F, PRBCs are running. Approximately 1.5 hours left for PRBCs to finish
pt bladder scan showing > 400 cc
pt straight cath'd- 2700 cc removed
pt temp 101.6 on kieran but reading 100.0 on dynomap
pt still tachycardic with HR from 100-120 possibly due to a fib history
Unable to do full head t toe assessment and Pt refusing to Turned and positioned every 2 hours.

## 2025-05-08 NOTE — PROGRESS NOTE ADULT - PROBLEM SELECTOR PLAN 9
- Prostate CA s/p radiation; unclear if this is contributing to urinary obstruction and retention   - Home Myrbetriq 50mg daily    > f/u Outpatient  - Holding Myrebetiq for now - will dc upon discharge as likely contributing to significant retention on admission  - Also w/ reported history BPH, will start Flomax 0.4mg qhs

## 2025-05-09 LAB
ALBUMIN SERPL ELPH-MCNC: 2.3 G/DL — LOW (ref 3.3–5)
ALP SERPL-CCNC: 148 U/L — HIGH (ref 40–120)
ALT FLD-CCNC: 23 U/L — SIGNIFICANT CHANGE UP (ref 10–45)
ANION GAP SERPL CALC-SCNC: 16 MMOL/L — SIGNIFICANT CHANGE UP (ref 5–17)
AST SERPL-CCNC: 30 U/L — SIGNIFICANT CHANGE UP (ref 10–40)
BILIRUB SERPL-MCNC: 0.5 MG/DL — SIGNIFICANT CHANGE UP (ref 0.2–1.2)
BUN SERPL-MCNC: 97 MG/DL — HIGH (ref 7–23)
CALCIUM SERPL-MCNC: 8.3 MG/DL — LOW (ref 8.4–10.5)
CHLORIDE SERPL-SCNC: 107 MMOL/L — SIGNIFICANT CHANGE UP (ref 96–108)
CO2 SERPL-SCNC: 20 MMOL/L — LOW (ref 22–31)
CREAT SERPL-MCNC: 4.69 MG/DL — HIGH (ref 0.5–1.3)
EGFR: 13 ML/MIN/1.73M2 — LOW
EGFR: 13 ML/MIN/1.73M2 — LOW
GLUCOSE BLDC GLUCOMTR-MCNC: 118 MG/DL — HIGH (ref 70–99)
GLUCOSE BLDC GLUCOMTR-MCNC: 121 MG/DL — HIGH (ref 70–99)
GLUCOSE BLDC GLUCOMTR-MCNC: 138 MG/DL — HIGH (ref 70–99)
GLUCOSE BLDC GLUCOMTR-MCNC: 145 MG/DL — HIGH (ref 70–99)
GLUCOSE SERPL-MCNC: 109 MG/DL — HIGH (ref 70–99)
HCT VFR BLD CALC: 23.7 % — LOW (ref 39–50)
HGB BLD-MCNC: 7 G/DL — CRITICAL LOW (ref 13–17)
MAGNESIUM SERPL-MCNC: 1.9 MG/DL — SIGNIFICANT CHANGE UP (ref 1.6–2.6)
MCHC RBC-ENTMCNC: 28.6 PG — SIGNIFICANT CHANGE UP (ref 27–34)
MCHC RBC-ENTMCNC: 29.5 G/DL — LOW (ref 32–36)
MCV RBC AUTO: 96.7 FL — SIGNIFICANT CHANGE UP (ref 80–100)
NRBC BLD AUTO-RTO: 0 /100 WBCS — SIGNIFICANT CHANGE UP (ref 0–0)
PHOSPHATE SERPL-MCNC: 4.3 MG/DL — SIGNIFICANT CHANGE UP (ref 2.5–4.5)
PLATELET # BLD AUTO: 187 K/UL — SIGNIFICANT CHANGE UP (ref 150–400)
POTASSIUM SERPL-MCNC: 4 MMOL/L — SIGNIFICANT CHANGE UP (ref 3.5–5.3)
POTASSIUM SERPL-SCNC: 4 MMOL/L — SIGNIFICANT CHANGE UP (ref 3.5–5.3)
PROT SERPL-MCNC: 5.7 G/DL — LOW (ref 6–8.3)
RBC # BLD: 2.45 M/UL — LOW (ref 4.2–5.8)
RBC # FLD: 14.6 % — HIGH (ref 10.3–14.5)
SODIUM SERPL-SCNC: 143 MMOL/L — SIGNIFICANT CHANGE UP (ref 135–145)
WBC # BLD: 8.6 K/UL — SIGNIFICANT CHANGE UP (ref 3.8–10.5)
WBC # FLD AUTO: 8.6 K/UL — SIGNIFICANT CHANGE UP (ref 3.8–10.5)

## 2025-05-09 PROCEDURE — 99222 1ST HOSP IP/OBS MODERATE 55: CPT

## 2025-05-09 PROCEDURE — 99232 SBSQ HOSP IP/OBS MODERATE 35: CPT | Mod: GC

## 2025-05-09 RX ORDER — EPOETIN ALFA 10000 [IU]/ML
10000 SOLUTION INTRAVENOUS; SUBCUTANEOUS ONCE
Refills: 0 | Status: COMPLETED | OUTPATIENT
Start: 2025-05-09 | End: 2025-05-09

## 2025-05-09 RX ADMIN — Medication 2 MILLIGRAM(S): at 17:41

## 2025-05-09 RX ADMIN — APIXABAN 5 MILLIGRAM(S): 2.5 TABLET, FILM COATED ORAL at 05:27

## 2025-05-09 RX ADMIN — Medication 650 MILLIGRAM(S): at 21:19

## 2025-05-09 RX ADMIN — TAMSULOSIN HYDROCHLORIDE 0.4 MILLIGRAM(S): 0.4 CAPSULE ORAL at 21:19

## 2025-05-09 RX ADMIN — Medication 650 MILLIGRAM(S): at 21:49

## 2025-05-09 RX ADMIN — Medication 2 TABLET(S): at 21:20

## 2025-05-09 RX ADMIN — Medication 2 MILLIGRAM(S): at 17:20

## 2025-05-09 RX ADMIN — AZTREONAM 50 MILLIGRAM(S): 2 INJECTION, POWDER, LYOPHILIZED, FOR SOLUTION INTRAMUSCULAR; INTRAVENOUS at 05:28

## 2025-05-09 RX ADMIN — Medication 25 MILLIGRAM(S): at 21:19

## 2025-05-09 RX ADMIN — SEVELAMER HYDROCHLORIDE 800 MILLIGRAM(S): 800 TABLET ORAL at 09:26

## 2025-05-09 RX ADMIN — AZTREONAM 50 MILLIGRAM(S): 2 INJECTION, POWDER, LYOPHILIZED, FOR SOLUTION INTRAMUSCULAR; INTRAVENOUS at 18:38

## 2025-05-09 RX ADMIN — Medication 1300 MILLIGRAM(S): at 21:20

## 2025-05-09 RX ADMIN — Medication 2 MILLIGRAM(S): at 09:25

## 2025-05-09 RX ADMIN — POLYETHYLENE GLYCOL 3350 17 GRAM(S): 17 POWDER, FOR SOLUTION ORAL at 05:28

## 2025-05-09 RX ADMIN — Medication 25 MILLIGRAM(S): at 13:26

## 2025-05-09 RX ADMIN — METOPROLOL SUCCINATE 100 MILLIGRAM(S): 50 TABLET, EXTENDED RELEASE ORAL at 05:27

## 2025-05-09 RX ADMIN — Medication 325 MILLIGRAM(S): at 13:26

## 2025-05-09 RX ADMIN — Medication 2 MILLIGRAM(S): at 11:52

## 2025-05-09 RX ADMIN — Medication 1 TABLET(S): at 13:26

## 2025-05-09 RX ADMIN — Medication 25 MILLIGRAM(S): at 05:27

## 2025-05-09 RX ADMIN — APIXABAN 5 MILLIGRAM(S): 2.5 TABLET, FILM COATED ORAL at 17:20

## 2025-05-09 RX ADMIN — Medication 1300 MILLIGRAM(S): at 05:27

## 2025-05-09 RX ADMIN — EPOETIN ALFA 10000 UNIT(S): 10000 SOLUTION INTRAVENOUS; SUBCUTANEOUS at 18:32

## 2025-05-09 RX ADMIN — Medication 1300 MILLIGRAM(S): at 13:26

## 2025-05-09 NOTE — PROGRESS NOTE ADULT - PROBLEM SELECTOR PLAN 4
- Hgb trend: 10.7 (4/5) -> 8.6 (4/14) -> 8.5 (05/01)  - Pt denies melena or hematochezia  - Home ferrous sulfate 325 mg (65mg iron) daily  - Iron studies c/w AOCD  - Reticulocyte index: 0.5, likely 2/2 CKD   - Possibly progressive ACD d/t CKD.    > Hgb = 7 5/5, 1/2u pRBC x2 ordered, Retacrit 10k SQ x1 ordered  > Trend CBC  > Maintain active T&S (last 05/04)  > c/w home ferrous sulfate 325mg PO QD - Hgb trend: 10.7 (4/5) -> 8.6 (4/14) -> 8.5 (05/01)  - Pt denies melena or hematochezia  - Home ferrous sulfate 325 mg (65mg iron) daily  - Iron studies c/w AOCD  - Reticulocyte index: 0.5, likely 2/2 CKD   - Possibly progressive ACD d/t CKD.    > Hgb 7.0 on 5/5, 1/2u pRBC x2 ordered, Retacrit 10k SQ x1 ordered  > Hgb 7.0 on 5/9, additional 1u ordered  > Trend CBC  > Maintain active T&S (last 05/04)  > c/w home ferrous sulfate 325mg PO QD

## 2025-05-09 NOTE — BH CONSULTATION LIAISON ASSESSMENT NOTE - NSBHCONSULTRECOMMENDOTHER_PSY_A_CORE FT
- No indication for inpatient psychiatric admission  - Patient may benefit from outpatient grief therapy around losing his twin brother, but he is not interested in this time.  - There is no clear indication for antidepressant at this time, and patient is not interested in taking any psychiatric medications.  - Please call if patient changes his mind or there are other concerning symptoms

## 2025-05-09 NOTE — BH CONSULTATION LIAISON ASSESSMENT NOTE - SUMMARY
Patient is a 64 year old single, domiciled alone, retired man with no past psychiatric history and a medical history significant for DMII, HTN, HLD, gout, a.fib on Eliquis, CKD, porstate cancer, and STACEY who is currently admitted for UTI with DEEDEE from rehab, where he was staying [facility]. Psychiatry is consulted for evaluate for depression as team has been concerned that patient is more tired, apathetic, and may be giving up. On exam patient is oriented to May but states it is 2015 and initially states it is the 25th before looking at his phone. He is able to complete DOWB attention task, but struggles with more difficult MOYB task. He registers 3/3 words and is able to recall 2/3 after delay.  Patient is a 64 year old single, domiciled alone, retired man with no past psychiatric history and a medical history significant for DMII, HTN, HLD, gout, a.fib on Eliquis, CKD, porstate cancer, and STACEY who is currently admitted for UTI with DEEDEE from rehab, where he was staying since discharge from [facility] after a fall. Psychiatry is consulted for evaluate for depression as team has been concerned that patient is more tired, apathetic, and may be giving up. On exam patient is oriented to May but states it is 2015 and initially states it is the 25th before looking at his phone. He is able to complete DOWB attention task, but struggles with more difficult MOYB task. He registers 3/3 words and is able to recall 2/3 after delay. Current cognitive exam indicates perhaps mild delirium, likely due to combination of prolonged series of hospitalizations, DEEDEE, UTI, medications, etc. Patient acknowledges feeling down and frustrated about being in the hospital for so long, feeling that he's not improving, and not having a good idea of what to expect in the future. However he denies feeling overall depressed, and denies other symptoms of depression including hopelessness, worthlessness, desire to give up or die, or suicidal thoughts. It does appear he is still grieving the loss of his twin brother 2.5 years ago, with whom he lived and did many social activities, and may be lonely living by himself now. He may benefit from grief counseling as an outpatient, though he currently declines. No clear indication for antidepressants at this time, and regardless patient is also declining any psychiatric medications. No concern for psychosis, dipti, or substance abuse.

## 2025-05-09 NOTE — BH CONSULTATION LIAISON ASSESSMENT NOTE - RISK ASSESSMENT
Risk factors: recent loss, illness    Protective factors: no current SI/HI, no h/o SA/SIB, no h/o psych admissions, no access to weapons, no active substance abuse, no psychosis, domiciled, social supports, compliant with treatment, help-seeking behaviors    Overall, pt is a low risk of harm to self/others and does not meet criteria for psychiatric admission.

## 2025-05-09 NOTE — BH CONSULTATION LIAISON ASSESSMENT NOTE - DESCRIPTION
Patient was born and raised in [place]. He currently lives alone. He was never  and is not currently in a relationship. He does not have any children. He was living with his twin brother until his brother  2.5 years ago. Patient is a retired .

## 2025-05-09 NOTE — BH CONSULTATION LIAISON ASSESSMENT NOTE - HPI (INCLUDE ILLNESS QUALITY, SEVERITY, DURATION, TIMING, CONTEXT, MODIFYING FACTORS, ASSOCIATED SIGNS AND SYMPTOMS)
Patient is a 64 year old single, domiciled alone, retired man with no past psychiatric history and a medical history significant for DMII, HTN, HLD, gout, a.fib on Eliquis, CKD, porstate cancer, and STACEY who is currently admitted for UTI with DEEDEE from rehab, where he was staying since discharge from [facility] after a fall. Psychiatry is consulted for evaluate for depression as team has been concerned that patient is more tired, apathetic, and may be giving up. On exam patient is oriented to May but states it is 2015 and initially states it is the 25th before looking at his phone. He is able to complete DOWB attention task, but struggles with more difficult MOYB task. He registers 3/3 words and is able to recall 2/3 after delay. He states he does feel somewhat depressed because, "I'm lying here, I don't feel like I'm getting any better, and no one is telling me any info". Initially states he came here from home because he broke his leg, but then is able to clarify that it was the reason for his initial hospitalization, but that he'd been at rehab before being sent here. States he did not feel depressed before being hospitalized. Denies feeling hopeless about his situation and insists "that's not my way, I always think today is going to be a better day than yesterday, and if it isn't then tomorrow will be a better day than today". Denies having any thoughts of giving up or desire to be dead. Denies having any suicidal ideations and states he's never felt that way. No history of suicide attempts or psychiatric hospitalization. Denies having any hallucinations and did not make any paranoid or delusional statements. Reports rare use of EtOH and no substance use. States he's generally been sleeping ok, and has never really had problems with sleeping, but uses a CPAP at home which he doesn't use here - doesn't want to use it here. States his appetite is ok. States he's been spending time here watching sports, likes the Mets and hockey. States when he was home he used to go to [place] and to the beach with his brother. Notes his twin brother passed away 2.5 years ago. They had lived together and were best friends. States he has friends he sees, but misses his brother.  Discussed options including therapy and if needed, medication, but patient declines all of these options for now, stating they are valuable for people who need them, but he does not think he does.

## 2025-05-09 NOTE — PROGRESS NOTE ADULT - PROBLEM SELECTOR PLAN 2
Febrile overnight 5/4, TMax 101.2. No complaint of dysuria but cloudy urine noted in Woods bag, UA grossly (+). RVP negative, CXR clr.    - S/p Vancomycin 750mg 5/5  - C/w Aztreonam, plan for 5d course (5/6-5/10) (penicillin allergy)  - Urine culture with pan-sensitive E. coli  - Blood Cx NG @ 48h Febrile overnight 5/4, TMax 101.2. No complaint of dysuria but cloudy urine noted in Woods bag, UA grossly (+). RVP negative, CXR clr.    - S/p Vancomycin 750mg 5/5  - C/w Aztreonam, plan for 5d course (5/6-5/10) (penicillin allergy)  - Urine culture with pan-sensitive E. coli  - Blood Cx NGTD

## 2025-05-09 NOTE — PROGRESS NOTE ADULT - SUBJECTIVE AND OBJECTIVE BOX
Overnight events noted      VITAL:  T(C): , Max: 37.1 (05-09-25 @ 04:56)  T(F): , Max: 98.7 (05-09-25 @ 04:56)  HR: 92 (05-09-25 @ 04:56)  BP: 123/77 (05-09-25 @ 04:56)  BP(mean): --  RR: 18 (05-09-25 @ 04:56)  SpO2: 97% (05-09-25 @ 04:56)  Wt(kg): --      PHYSICAL EXAM:  Constitutional: NAD, Alert  HEENT: NCAT, MMM  Neck: Supple, No JVD  Respiratory: CTA-b/l  Cardiovascular: tachy reg s1s2  Gastrointestinal: BS+, soft, NT/ND  : (+)maldonado  Extremities: No peripheral edema b/l  Neurological: reduced generalized strength  Back: no CVAT b/l  Skin: No rashes, no nevi    LABS:                        7.0    8.60  )-----------( 187      ( 09 May 2025 06:47 )             23.7     Na(143)/K(4.0)/Cl(107)/HCO3(20)/BUN(97)/Cr(4.69)Glu(109)/Ca(8.3)/Mg(1.9)/PO4(4.3)    05-09 @ 06:47  Na(143)/K(3.8)/Cl(107)/HCO3(18)/BUN(94)/Cr(4.84)Glu(102)/Ca(8.7)/Mg(1.9)/PO4(5.1)    05-08 @ 07:05  Na(140)/K(4.0)/Cl(104)/HCO3(21)/BUN(113)/Cr(5.06)Glu(114)/Ca(8.9)/Mg(1.9)/PO4(5.6)    05-07 @ 08:45      IMPRESSION: 64M w/ HTN, gout, AFib, and CKD4-5, 5/1/25 p/w DEEDEE on CKD  (1)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  (2)DEEDEE - obstructive - improving s/p maldonado placement  (3)Metabolic acidosis - renally mediated -acceptable, on standing PO NaHCO3  (4)Hyperphosphatemia -improving, on Renvela with meals - we could d/c the Renvela at this point  (5)CV - acceptable BP/volume  (6)Anemia - s/p PRBCs/Retacrit; on standing PO FeSO4; Hb downtrending  (7)ID - UTI - on IV Azactam      RECOMMEND:  (1)Can d/c Renvela  (2)Repeat Retacrit 10K SQ  (3)Defer to primary team regarding option of further PRBC administration  (4)Continue NaHCO3 and FeSO4 as ordered  (5)Continue abx for GFR<15  (6)BMP+Mg+PO4 daily while admitted        Hilton May MD  [facility]  Office/on call physician: [phone]  Cell (7a-7p): [phone]       No pain, no sob      VITAL:  T(C): , Max: 37.1 (05-09-25 @ 04:56)  T(F): , Max: 98.7 (05-09-25 @ 04:56)  HR: 92 (05-09-25 @ 04:56)  BP: 123/77 (05-09-25 @ 04:56)  BP(mean): --  RR: 18 (05-09-25 @ 04:56)  SpO2: 97% (05-09-25 @ 04:56)  Wt(kg): --      PHYSICAL EXAM:  Constitutional: NAD, Alert  HEENT: NCAT, MMM  Neck: Supple, No JVD  Respiratory: CTA-b/l  Cardiovascular: tachy reg s1s2  Gastrointestinal: BS+, soft, NT/ND  : (+)maldonado  Extremities: No peripheral edema b/l  Neurological: reduced generalized strength  Back: no CVAT b/l  Skin: No rashes, no nevi    LABS:                        7.0    8.60  )-----------( 187      ( 09 May 2025 06:47 )             23.7     Na(143)/K(4.0)/Cl(107)/HCO3(20)/BUN(97)/Cr(4.69)Glu(109)/Ca(8.3)/Mg(1.9)/PO4(4.3)    05-09 @ 06:47  Na(143)/K(3.8)/Cl(107)/HCO3(18)/BUN(94)/Cr(4.84)Glu(102)/Ca(8.7)/Mg(1.9)/PO4(5.1)    05-08 @ 07:05  Na(140)/K(4.0)/Cl(104)/HCO3(21)/BUN(113)/Cr(5.06)Glu(114)/Ca(8.9)/Mg(1.9)/PO4(5.6)    05-07 @ 08:45      IMPRESSION: 64M w/ HTN, gout, AFib, and CKD4-5, 5/1/25 p/w DEEDEE on CKD  (1)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  (2)DEEDEE - obstructive - improving s/p maldonado placement  (3)Metabolic acidosis - renally mediated -acceptable, on standing PO NaHCO3  (4)Hyperphosphatemia -improving, on Renvela with meals - we could d/c the Renvela at this point  (5)CV - acceptable BP/volume  (6)Anemia - s/p PRBCs/Retacrit; on standing PO FeSO4; Hb downtrending  (7)ID - UTI - on IV Azactam      RECOMMEND:  (1)Can d/c Renvela  (2)Repeat Retacrit 10K SQ  (3)Defer to primary team regarding option of further PRBC administration  (4)Continue NaHCO3 and FeSO4 as ordered  (5)Continue abx for GFR<15  (6)BMP+Mg+PO4 daily while admitted        Hilton May MD  [facility]  Office/on call physician: [phone]  Cell (7a-7p): [phone]       No pain, no sob      VITAL:  T(C): , Max: 37.1 (05-09-25 @ 04:56)  T(F): , Max: 98.7 (05-09-25 @ 04:56)  HR: 92 (05-09-25 @ 04:56)  BP: 123/77 (05-09-25 @ 04:56)  BP(mean): --  RR: 18 (05-09-25 @ 04:56)  SpO2: 97% (05-09-25 @ 04:56)  Wt(kg): --      PHYSICAL EXAM:  Constitutional: NAD, Alert  HEENT: NCAT, MMM  Neck: Supple, No JVD  Respiratory: CTA-b/l  Cardiovascular: tachy reg s1s2  Gastrointestinal: BS+, soft, NT/ND  : (+)maldonado  Extremities: No peripheral edema b/l  Neurological: reduced generalized strength  Back: no CVAT b/l  Skin: No rashes, no nevi    LABS:                        7.0    8.60  )-----------( 187      ( 09 May 2025 06:47 )             23.7     Na(143)/K(4.0)/Cl(107)/HCO3(20)/BUN(97)/Cr(4.69)Glu(109)/Ca(8.3)/Mg(1.9)/PO4(4.3)    05-09 @ 06:47  Na(143)/K(3.8)/Cl(107)/HCO3(18)/BUN(94)/Cr(4.84)Glu(102)/Ca(8.7)/Mg(1.9)/PO4(5.1)    05-08 @ 07:05  Na(140)/K(4.0)/Cl(104)/HCO3(21)/BUN(113)/Cr(5.06)Glu(114)/Ca(8.9)/Mg(1.9)/PO4(5.6)    05-07 @ 08:45      IMPRESSION: 64M w/ HTN, gout, AFib, and CKD4-5, 5/1/25 p/w DEEDEE on CKD  (1)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  (2)DEEDEE - obstructive - improving s/p maldonado placement  (3)Metabolic acidosis - renally mediated -acceptable, on standing PO NaHCO3  (4)Hyperphosphatemia -improving, on Renvela with meals - we could d/c the Renvela at this point  (5)CV - acceptable BP/volume  (6)Anemia - s/p PRBCs/Retacrit; on standing PO FeSO4; Hb downtrending  (7)ID - UTI - on IV Azactam  (8) - reasonable to attempt TOV at this point    RECOMMEND:  (1)Can d/c Renvela  (2)Repeat Retacrit 10K SQ  (3)Defer to primary team regarding option of further PRBC administration  (4)Continue NaHCO3 and FeSO4 as ordered  (5)No objection to TOV  (6)Continue abx for GFR<15  (7)BMP+Mg+PO4 daily while admitted    plan of care d/w'd house staff Dr. Deysi Costello by teams      Hilton May MD  [facility]  Office/on call physician: [phone]  Cell (7a-7p): [phone]

## 2025-05-09 NOTE — BH CONSULTATION LIAISON ASSESSMENT NOTE - NSBHCHARTREVIEWLAB_PSY_A_CORE FT
Phosphorus in AM (05.09.25 @ 06:47)   Phosphorus: 4.3 mg/dL  Magnesium in AM (05.09.25 @ 06:47)   Magnesium: 1.9  Comprehensive Metabolic Panel in AM (05.09.25 @ 06:47)   Sodium: 143 mmol/L  Potassium: 4.0 mmol/L  Chloride: 107 mmol/L  Carbon Dioxide: 20 mmol/L  Anion Gap: 16 mmol/L  Blood Urea Nitrogen: 97 mg/dL  Creatinine: 4.69 mg/dL  Glucose: 109 mg/dL  Calcium: 8.3 mg/dL  Protein Total: 5.7 g/dL  Albumin: 2.3 g/dL  Bilirubin Total: 0.5 mg/dL  Alkaline Phosphatase: 148 U/L  Aspartate Aminotransferase (AST/SGOT): 30 U/L  Alanine Aminotransferase (ALT/SGPT): 23 U/L  eGFR: 13  Complete Blood Count in AM (05.09.25 @ 06:47)   Auto NRBC: 0 /100 WBCs  WBC Count: 8.60 K/uL  RBC Count: 2.45 M/uL  Hemoglobin: 7.0 g/dL  Hematocrit: 23.7 %  Mean Cell Volume: 96.7 fl  Mean Cell Hemoglobin: 28.6 pg  Mean Cell Hemoglobin Conc: 29.5 g/dL  Red Cell Distrib Width: 14.6 %  Platelet Count - Automated: 187 K/uL

## 2025-05-09 NOTE — PROGRESS NOTE ADULT - SUBJECTIVE AND OBJECTIVE BOX
Patient is a 64y old  Male who presents with a chief complaint of DEEDEE on CKD, normocytic anemia (09 May 2025 08:34)      INTERVAL HX:  No acute overnight events. Pt seen and examined at bedside. Cr improved from day prior. No interval complaints elicited; denies CP, SOB, dysuria, abd pain, n/v, fever/chills.    Allergies:  penicillin (Unknown)  clindamycin (Rash)    Medications:  acetaminophen     Tablet .. 650 milliGRAM(s) Oral every 6 hours PRN  apixaban 5 milliGRAM(s) Oral two times a day  aztreonam  IVPB      aztreonam  IVPB 1000 milliGRAM(s) IV Intermittent every 12 hours  epoetin noa-epbx (RETACRIT) Injectable 03425 Unit(s) SubCutaneous once  ferrous    sulfate 325 milliGRAM(s) Oral daily  hydrALAZINE 25 milliGRAM(s) Oral every 8 hours  HYDROmorphone   Tablet 2 milliGRAM(s) Oral every 6 hours PRN  lactulose Syrup 10 Gram(s) Oral once PRN  lidocaine   4% Patch 1 Patch Transdermal daily PRN  methocarbamol 750 milliGRAM(s) Oral every 12 hours PRN  metoprolol succinate  milliGRAM(s) Oral daily  Nephro-shannon 1 Tablet(s) Oral daily  polyethylene glycol 3350 17 Gram(s) Oral two times a day  senna 2 Tablet(s) Oral at bedtime  sodium bicarbonate 1300 milliGRAM(s) Oral three times a day  tamsulosin 0.4 milliGRAM(s) Oral at bedtime  traMADol 50 milliGRAM(s) Oral every 12 hours PRN    Vitals:  T(C): 36.8 (05-09-25 @ 12:37), Max: 37.1 (05-09-25 @ 04:56)  HR: 91 (05-09-25 @ 12:37) (91 - 104)  BP: 139/86 (05-09-25 @ 12:37) (123/77 - 140/70)  RR: 18 (05-09-25 @ 12:37) (18 - 18)  SpO2: 97% (05-09-25 @ 12:37) (95% - 97%)  I/O's:    05-08-25 @ 07:01  -  05-09-25 @ 07:00  --------------------------------------------------------  IN: 360 mL / OUT: 375 mL / NET: -15 mL      Physical Exam:  GENERAL: NAD, resting in bed  HEAD: normocephalic, atraumatic  CARDIAC: regular rate and rhythm, normal S1S2  PULM: normal breath sounds, clear to ascultation bilaterally, no rales, rhonchi, wheezing  GI: Abd soft, nondistended, nontender, no rebound tenderness, no guarding, no rigidity  : +Woods draining urine  NEURO: no focal motor or sensory deficits, AAOx3  MSK - trace edema     Labs:                        7.0    8.60  )-----------( 187      ( 09 May 2025 06:47 )             23.7     05-09    143  |  107  |  97[H]  ----------------------------<  109[H]  4.0   |  20[L]  |  4.69[H]    Ca    8.3[L]      09 May 2025 06:47  Phos  4.3     05-09  Mg     1.9     05-09    TPro  5.7[L]  /  Alb  2.3[L]  /  TBili  0.5  /  DBili  x   /  AST  30  /  ALT  23  /  AlkPhos  148[H]  05-09        Radiology/Procedures: Reviewed.

## 2025-05-09 NOTE — PROGRESS NOTE ADULT - PROBLEM SELECTOR PLAN 10
-DVT Ppx: c/w Eliquis  -Diet: CC/nephro diet  -Fall Precautions  -Dispo: Pending improvement in Cr   -Wound Care d/c recs: "Pt will need Group 2 mattress on hospital bed and ROHO cushion for wheel chair upon discharge home"  -PT eval: likely back to [facility] -DVT Ppx: c/w Eliquis  -Diet: CC/nephro diet  -Fall Precautions  -Dispo: eventual [facility]  -Wound Care d/c recs: "Pt will need Group 2 mattress on hospital bed and ROHO cushion for wheel chair upon discharge home"  -PT eval: likely back to [facility]

## 2025-05-09 NOTE — PROVIDER CONTACT NOTE (CRITICAL VALUE NOTIFICATION) - ACTION/TREATMENT ORDERED:
Md made aware.
md to discuss w team. no transfusion for now per md
Notified to Dr. Coronel and repeated CBC
md to discusss w team. no transfusion for now per md

## 2025-05-09 NOTE — PROGRESS NOTE ADULT - PROBLEM SELECTOR PLAN 3
- 4/25/25 Renal US: multiple bilateral renal cysts, which are increased in size and number compared to the prior CT scan - c/f neoplasm  - 05/02/25 CTAP w/o contrast: Limited exam without IV contrast unable to diagnostically evaluate the bilateral renal lesions of varying size and complexity. Recommend renal protocol MRI with IV contrast if clinically desired    > Plan for renal protocol MRI with IV contrast when kidney fx improves.

## 2025-05-09 NOTE — BH CONSULTATION LIAISON ASSESSMENT NOTE - CURRENT MEDICATION
MEDICATIONS  (STANDING):  apixaban 5 milliGRAM(s) Oral two times a day  aztreonam  IVPB      aztreonam  IVPB 1000 milliGRAM(s) IV Intermittent every 12 hours  ferrous    sulfate 325 milliGRAM(s) Oral daily  hydrALAZINE 25 milliGRAM(s) Oral every 8 hours  metoprolol succinate  milliGRAM(s) Oral daily  Nephro-shannon 1 Tablet(s) Oral daily  polyethylene glycol 3350 17 Gram(s) Oral two times a day  senna 2 Tablet(s) Oral at bedtime  sodium bicarbonate 1300 milliGRAM(s) Oral three times a day  sodium chloride 0.9%. 1000 milliLiter(s) (50 mL/Hr) IV Continuous <Continuous>  tamsulosin 0.4 milliGRAM(s) Oral at bedtime    MEDICATIONS  (PRN):  acetaminophen     Tablet .. 650 milliGRAM(s) Oral every 6 hours PRN Temp greater or equal to 38C (100.4F), Mild Pain (1 - 3)  HYDROmorphone   Tablet 2 milliGRAM(s) Oral every 6 hours PRN Turning in bed/cleaning and/or SEVERE pain  lactulose Syrup 10 Gram(s) Oral once PRN constipation  lidocaine   4% Patch 1 Patch Transdermal daily PRN L knee pain  methocarbamol 750 milliGRAM(s) Oral every 12 hours PRN Muscle Spasm  traMADol 50 milliGRAM(s) Oral every 12 hours PRN Moderate Pain (4 - 6)

## 2025-05-09 NOTE — BH CONSULTATION LIAISON ASSESSMENT NOTE - NSBHCONSULTFOLLOWAFTERCARE_PSY_A_CORE FT
Patient is not interested in outpatient mental health treatment at this time.   If desired, patient can follow-up with [facility] geriatric psychiatry by calling [phone]

## 2025-05-09 NOTE — PROGRESS NOTE ADULT - PROBLEM SELECTOR PLAN 6
- A1c: 4.6 4/3/2025  - Home Januvia 50 mg daily    > blood glucose well controlled while inpatient, monitor pre-meal FS  > Hold home Januvia, likely will discontinue upon d/c - A1c: 4.6 4/3/2025  - Home Januvia 50 mg daily    > blood glucose well controlled while inpatient  > Hold home Januvia, likely will discontinue upon d/c

## 2025-05-09 NOTE — PROGRESS NOTE ADULT - PROBLEM SELECTOR PLAN 1
- Per nephro note in prior hospitalization: unclear if DEEDEE on CKD vs slowly progressive CKD.   - 5/1/25 Cr 5.85 at admission. (In prior hospitalization, Cr 3.9 on admission. Per Dr. Woo, baseline Cr is 2.5)  - Nephrology consulted - Dr. Hilton May (Nephrologist at [facility]): would ideally like to defer HD for now  - Was previously followed by nephrologist (Dr Mauricio Cortes) but hasn't seen for last couple of years.  - 4/25/25 Renal US: multiple bilateral renal cysts, which are increased in size and number compared to the prior CT scan - c/f neoplasm  - Urine Lytes (05/02): FeNa 4.2% suggestive of intrinsic disease  - s/p maldonado catheter placement in ER with 2.7L fluid removed  - Ddx: Likely DEEDEE on CKD 2/2 post-renal DEEDEE s/p 2.7L fluid removed on maldonado catheter; now possibly also with component of pre-renal DEEDEE due to large volume UOP after maldonado placement, which may explain only slight decrease in Cr. A more notable improvement in Cr would have been expected after maldonado placement for a true post-renal DEEDEE    > c/w indwelling maldonado (05/01-  > daily BMP/Mg/Phos  > increase bicarb to 1300mg PO TID (up-titrated 05/05)  > C/w Sevelamer 800 mg BID; d/c once phos normalizes   > c/w strict intake/output Q6H  > s/p IVF, will d/w nephrology today regarding further IVF as pt appears dry  > f/u Nephrology recommendations  > Eventual TOV - Per nephro note in prior hospitalization: unclear if DEEDEE on CKD vs slowly progressive CKD.   - 5/1/25 Cr 5.85 at admission. (In prior hospitalization, Cr 3.9 on admission. Per Dr. Woo, baseline Cr is 2.5)  - Nephrology consulted - Dr. Hilton May (Nephrologist at [facility])  - 4/25/25 Renal US: multiple bilateral renal cysts, which are increased in size and number compared to the prior CT scan - c/f neoplasm  - Urine Lytes (05/02): FeNa 4.2% suggestive of intrinsic disease  - s/p maldonado catheter placement in ER with 2.7L fluid removed  - Ddx: Likely DEEDEE on CKD 2/2 post-renal DEEDEE s/p 2.7L fluid removed on maldonado catheter; now possibly also with component of pre-renal DEEDEE due to large volume UOP after maldonado placement --> overall DEEDEE improving    > c/w indwelling maldonado (05/01 - present) - will consider TOV  > daily BMP/Mg/Phos  > c/w bicarb 1300mg PO TID (up-titrated 05/05)  > s/p Sevelamer, discontinued 5/9  > c/w strict intake/output Q6H  > appreciate Nephrology recommendations

## 2025-05-09 NOTE — PROGRESS NOTE ADULT - PROBLEM SELECTOR PLAN 9
- Prostate CA s/p radiation; unclear if this is contributing to urinary obstruction and retention   - Home Myrbetriq 50mg daily    > f/u Outpatient  - Holding Myrebetiq for now - will dc upon discharge as likely contributing to significant retention on admission  - Also w/ reported history BPH, will start Flomax 0.4mg qhs - Prostate CA s/p radiation; unclear if this is contributing to urinary obstruction and retention   - Home Myrbetriq 50mg daily    > f/u Outpatient  - Holding Myrebetiq for now - will dc upon discharge as likely contributing to significant retention on admission  - Also w/ reported history BPH, started Flomax 0.4mg qhs

## 2025-05-09 NOTE — BH CONSULTATION LIAISON ASSESSMENT NOTE - NSBHCHARTREVIEWVS_PSY_A_CORE FT
Vital Signs Last 24 Hrs  T(C): 36.6 (10 May 2025 04:18), Max: 38.3 (09 May 2025 20:34)  T(F): 97.8 (10 May 2025 04:18), Max: 100.9 (09 May 2025 20:34)  HR: 91 (10 May 2025 04:18) (91 - 96)  BP: 157/93 (10 May 2025 04:18) (121/83 - 157/93)  BP(mean): --  RR: 18 (10 May 2025 04:18) (18 - 18)  SpO2: 97% (10 May 2025 04:18) (97% - 98%)    Parameters below as of 10 May 2025 04:18  Patient On (Oxygen Delivery Method): room air

## 2025-05-09 NOTE — PROVIDER CONTACT NOTE (CRITICAL VALUE NOTIFICATION) - NAME OF MD/NP/PA/DO NOTIFIED:
CVICU - [place] CRITICAL CARE SERVICE     PROGRESS NOTE    Patient:  Jerald Davis Date:  7/24/2023   male, 63 year old  Admit Date:  6/27/2023   Attending:  Brayan Giles MD Primary Care Physician:  Asher Bean DO     ICU Admission Date:  6/27/24     Operation:       7/5/23:   1.)  Aortic valve replacement (23mm Inspiris Resilia stented bioprosthesis)  2.) Aortic root abscess debridement and patch reconstruction (Photofix bovine pericardium)  3.) Drainage of bilateral pleural effusions (500mL left, 750mL right)     7/2/23 I&D left femur with placement of antibiotic beads     Postoperative Day:  19                                                                                    SUMMARY OF PLAN    Acute Hypoxic respiratory failure  - pull volume when hemodynamics allow  - follow repeat cultures: negative to date  - Scheduled for Trach tomorrow morning, discussed with ENT this morning  - continue current vent strategy    Sedation/Analgesia: Transitioned to oral agents  - prn Gabapentin, olanzapine and Norco     Vasoplegia  - Pressor needs have been labile with sleep and sedation needs, low suspicion for ongoing sepsis or new infection  - no overt bleeding  - continue Midodrine 10 mg Q8  - Continue BID Fludrocortisone     Acute Renal Failure  - continue renal replacement therapy, fluid removal limited with hypotension at this time  - hold citrate with alkalosis  - trend phosphate and replete, discussed with nephrology team      Strep Pneumoniae Endocarditis with septic emboli  - plan per ID for long term ceftriaxone noted from 7/21 note  - transition back to Ceftriaxone 7/23 s/p 7 days of Zosyn  - PICC placed to replace LIJ  - follow-up repeat cultures: negative to date                                         -----------------------------    Best Practice:  - VTE:  Argatroban  - SUP:  Daily PPI  - Glycemic control:  Autoregulated, DC SS   - LDA assessment and removal:  RIJ HD catheter, left axillary art line,  PICC placed 7/23  - Nutrition:  TF  - Last BM:  1 (07/23/23 0100)  - Therapy/mobilization:  ADAT      ACTIVE PROBLEM LIST  Septic Shock  Acute hypoxic respiratory failure  Immunocompromised  Strep Pneumoniae Aortic valve endocarditis s/p AVR  Septic embolic to brain and left femur s/p I&D  Multiorgan dysfunction  VALERIO  Thrombocytopenia  Right radial arterial occlusion  Bilateral foot necrosis  Atrial flutter  Bilateral pneumothoraces s/p chest tube  Critical illness myopathy  GIB  History of lymphoblastic leukemia  Hypogammaglobulinemia    Disposition:  CVICU  Code Status:  Full Code        DISCUSSION  Mr. Davis is a 63 year old male with PMH of lymphoblastic leukemia (2002) s/p chemotherapy (2002 & 2016) on Imbruvica, Hypogammaglobulinemia and BPH. He was evaluated in June for progressive left leg pain following an antibiotic course for community acquired pneumonia. Further work-up revealed abnormal left femur inflammation prompting ortho work-up and an I&D with antibiotic bead placement 7/2/23 with Dr. Lara. He had blood cultures positive 6/27/23 with Streptococcus pneumoniae and subsequent MARILEE notable for severe aortic stenosis and AV vegetation. Dr. Giles was consulted and the patient under an AVR with root abscess debridement and patch repair 7/5/23. His post-op course has been complicated by septic shock with multi-organ involvement, acute hypoxic respiratory failure, digit ischemia and necrosis, acute coagulopathy with thrombocytopenia. Severe critical illness myopathy requiring reintubation with plan for tracheostomy 7/25/23.      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 Oliveira per stroke team     #Sedation/Analgesia  - Precedex/Fentanyl transitioned off overnight  - oral prns  Dr Costello available     [facility]  #Septic shock with persistent Vasoplegia  - low suspicion for untreated infection  - pressor needs fluctuating with sedation needs and sleep/wake cycle  - wean NE and vasopressin for MAP 65  - Midodrine 10mg Q8  - BID fludrocortisone, hold Hydrocortisone challenge to promote healing  - transition off PPV when able  - trend culture data     #Atrial flutter: currently working for rate control, may benefit from rhythm control   - Amiodarone: titrate drip to 0.5, transitioned to PO by surgery may need drip if rate not controlled     #Native Valve Endocarditis with severe AR and moderate MR  - Day 19 post AVR with improved MR following aortic valve replacement  - sternal incision is stable/healing  - AC per surgery  - wires remain in place     Pulm  #Acute Hypoxic Respiratory Failure  -s/p Re-intubation 7/20: concern for secondary PNA, I suspect volume and critical illness myopathy are playing a bigger role in re-intubation need. Wt is up, will need to address volume status as systemic pressures allow  - bilateral pleural effusions drained intra-op, subsequent small bilateral pneumothoraces   - Pigtail in place, no residual air leak, minimal output: DC today  - plan for trach with ENT at next available OR time  - currently at net zero fluid removal     #Bilateral Pneumothorax  - stable s/p right apical pigtail  - clamp and DC if no reaccumulation of air     ID  #Streptococcal Pneumoniae Bacteremia/Endocarditis complicated by left femur abscess, septic CVA  - cultures negative starting 6/28/23  - AVR s/p 7/5/23   - ID following  - s/p left femur I &D 7/2/23  - Rhinovirus + 7/14: deisolate     Antibiotics broadened by critical care 7/16 with increased lung opacities with concern for HCAP  - repeat cultures are negative to date, s/p 7 day course of Zosyn and transitioned back to Ceftriaxone  - 7/23 PICC  - DC central line as able (currently required for renal replacement therapy)        Antimicrobials:     Pip/tazo: 7/16/23-7/23/23  Linezolid: 7/16/23-7/19/23  Ceftazidime: 7/13/23-7/16/23  Vancomycin: 7/10/23-7/13/23  Meropenem: 7/10/23-7/12/23  Cefepime: 7/7/23-7/10/23  Penicillin: 7/5/23-7/7/23  Cefazolin: 7/4/23-7/5/23  Ceftriaxone: 6/27/23-7/5/23, 7/23/23-  Levofloxacin: 6/9/23-6/16/23     GI  #GIB: resolved, daily PPI  #Shock Liver: resolved  #Recent appendectomy 1/7/23     Renal  #VALERIO with sepsis and dye  - continue renal replacement therapy  - trend and replace phos     Heme/Onc  #Thrombocytopenia with PF4 negative x2  #Right radial artery emboli, s/p removal of art line  - preemptive Argatroban, plan to transition to warfarin following procedures (Trach on Tuesday)     #History of lymphoblastic leukemia diagnosed 2002  - s/p second round of chemotherapy 2016  - currently on Imbruvica: on hold with active infection     #Hypogammaglobulinemia  - Historically pt has received IVIG Q2 months   - last dose 6/28/23   - Heme/Onc team is following and managing        MSK  #Left femur abscess: s/p left femur I&D, incision site is stable     #Bilateral lower extremity digit ischemia with dry gangrene  - supportive care  - currently stable with sign of infection    Vital Last Value 24 Hour Range   Temperature 97.8 °F (36.6 °C) (07/24/23 0800) Temp  Min: 96.6 °F (35.9 °C)  Max: 98.4 °F (36.9 °C)   Pulse (!) 101 (07/24/23 0800) Pulse  Min: 65  Max: 128   Respiratory 20 (07/24/23 0800) Resp  Min: 15  Max: 33   Non-Invasive  Blood Pressure 102/48 (07/11/23 1222) No data recorded   Pulse Oximetry 91 % (07/24/23 0800) SpO2  Min: 85 %  Max: 100 %   Arterial   Blood Pressure (!) 87/53 (07/24/23 0800) Arterial Line BP  Min: 74/40  Max: 124/72      SvO2 (!) 49 %     I/O last 3 completed shifts:  In: 3021 [I.V.:940; Other:695; NG/GT:1386]  Out: 4001 [Urine:50; Other:3501; Stool:450]  I/O this shift:  In: -   Out: 100 [Other:100]      Physical Exam:  Neurologic:  Awake and oriented, no new focal deficits, frustrated  Neck:  ETT in  place  Chest: Bilateral breath sounds, diminished  Heart: RRR, flutter waves  Abdomen:  Soft, non-tender   Extremities: anasarca, thin  Skin:  Dry gangrene in toes and right thumb      O2 Vent Settings Last Value   Mode Pressure A/C   Rate 20   Tidal Volume 600 mL   Peak Inspiratory Pressure     Plateau Pressure 20 cm H2O   Pressure Support 12 cm H20   PEEP/CPAC/EPAP 8 cm H20   FiO2 40 %         Pertinent Reviewed:  Allergies, Medications, Labs, Imaging and Physician and Nursing Notes      ACCS Attestation    This patient is critically ill as documented above.  I evaluated the patient and reviewed imaging and laboratory data.  Critical care services I provided [id number] 60 minutes, not including time allocated for procedures.    Anahy Ramos MD  7/24/2023

## 2025-05-10 DIAGNOSIS — R41.0 DISORIENTATION, UNSPECIFIED: ICD-10-CM

## 2025-05-10 LAB
ALBUMIN SERPL ELPH-MCNC: 2.6 G/DL — LOW (ref 3.3–5)
ALP SERPL-CCNC: 154 U/L — HIGH (ref 40–120)
ALT FLD-CCNC: 26 U/L — SIGNIFICANT CHANGE UP (ref 10–45)
ANION GAP SERPL CALC-SCNC: 15 MMOL/L — SIGNIFICANT CHANGE UP (ref 5–17)
APPEARANCE UR: ABNORMAL
AST SERPL-CCNC: 28 U/L — SIGNIFICANT CHANGE UP (ref 10–40)
BACTERIA # UR AUTO: NEGATIVE /HPF — SIGNIFICANT CHANGE UP
BILIRUB SERPL-MCNC: 0.6 MG/DL — SIGNIFICANT CHANGE UP (ref 0.2–1.2)
BILIRUB UR-MCNC: NEGATIVE — SIGNIFICANT CHANGE UP
BLD GP AB SCN SERPL QL: NEGATIVE — SIGNIFICANT CHANGE UP
BUN SERPL-MCNC: 106 MG/DL — HIGH (ref 7–23)
CALCIUM SERPL-MCNC: 8.5 MG/DL — SIGNIFICANT CHANGE UP (ref 8.4–10.5)
CAST: 2 /LPF — SIGNIFICANT CHANGE UP (ref 0–4)
CHLORIDE SERPL-SCNC: 107 MMOL/L — SIGNIFICANT CHANGE UP (ref 96–108)
CO2 SERPL-SCNC: 21 MMOL/L — LOW (ref 22–31)
COLOR SPEC: YELLOW — SIGNIFICANT CHANGE UP
CREAT SERPL-MCNC: 4.48 MG/DL — HIGH (ref 0.5–1.3)
CULTURE RESULTS: SIGNIFICANT CHANGE UP
CULTURE RESULTS: SIGNIFICANT CHANGE UP
DIFF PNL FLD: ABNORMAL
EGFR: 14 ML/MIN/1.73M2 — LOW
EGFR: 14 ML/MIN/1.73M2 — LOW
FLUAV AG NPH QL: SIGNIFICANT CHANGE UP
FLUBV AG NPH QL: SIGNIFICANT CHANGE UP
GLUCOSE BLDC GLUCOMTR-MCNC: 108 MG/DL — HIGH (ref 70–99)
GLUCOSE BLDC GLUCOMTR-MCNC: 108 MG/DL — HIGH (ref 70–99)
GLUCOSE BLDC GLUCOMTR-MCNC: 124 MG/DL — HIGH (ref 70–99)
GLUCOSE SERPL-MCNC: 107 MG/DL — HIGH (ref 70–99)
GLUCOSE UR QL: NEGATIVE MG/DL — SIGNIFICANT CHANGE UP
HCT VFR BLD CALC: 27.5 % — LOW (ref 39–50)
HGB BLD-MCNC: 8.4 G/DL — LOW (ref 13–17)
KETONES UR-MCNC: NEGATIVE MG/DL — SIGNIFICANT CHANGE UP
LEUKOCYTE ESTERASE UR-ACNC: ABNORMAL
MAGNESIUM SERPL-MCNC: 2 MG/DL — SIGNIFICANT CHANGE UP (ref 1.6–2.6)
MCHC RBC-ENTMCNC: 28.6 PG — SIGNIFICANT CHANGE UP (ref 27–34)
MCHC RBC-ENTMCNC: 30.5 G/DL — LOW (ref 32–36)
MCV RBC AUTO: 93.5 FL — SIGNIFICANT CHANGE UP (ref 80–100)
NITRITE UR-MCNC: NEGATIVE — SIGNIFICANT CHANGE UP
NRBC BLD AUTO-RTO: 0 /100 WBCS — SIGNIFICANT CHANGE UP (ref 0–0)
PH UR: 5.5 — SIGNIFICANT CHANGE UP (ref 5–8)
PHOSPHATE SERPL-MCNC: 3.9 MG/DL — SIGNIFICANT CHANGE UP (ref 2.5–4.5)
PLATELET # BLD AUTO: 192 K/UL — SIGNIFICANT CHANGE UP (ref 150–400)
POTASSIUM SERPL-MCNC: 3.9 MMOL/L — SIGNIFICANT CHANGE UP (ref 3.5–5.3)
POTASSIUM SERPL-SCNC: 3.9 MMOL/L — SIGNIFICANT CHANGE UP (ref 3.5–5.3)
PROT SERPL-MCNC: 6 G/DL — SIGNIFICANT CHANGE UP (ref 6–8.3)
PROT UR-MCNC: 100 MG/DL
RBC # BLD: 2.94 M/UL — LOW (ref 4.2–5.8)
RBC # FLD: 15.8 % — HIGH (ref 10.3–14.5)
RBC CASTS # UR COMP ASSIST: 110 /HPF — HIGH (ref 0–4)
REVIEW: SIGNIFICANT CHANGE UP
RH IG SCN BLD-IMP: POSITIVE — SIGNIFICANT CHANGE UP
RSV RNA NPH QL NAA+NON-PROBE: SIGNIFICANT CHANGE UP
SARS-COV-2 RNA SPEC QL NAA+PROBE: SIGNIFICANT CHANGE UP
SODIUM SERPL-SCNC: 143 MMOL/L — SIGNIFICANT CHANGE UP (ref 135–145)
SOURCE RESPIRATORY: SIGNIFICANT CHANGE UP
SP GR SPEC: 1.01 — SIGNIFICANT CHANGE UP (ref 1–1.03)
SPECIMEN SOURCE: SIGNIFICANT CHANGE UP
SPECIMEN SOURCE: SIGNIFICANT CHANGE UP
SQUAMOUS # UR AUTO: 1 /HPF — SIGNIFICANT CHANGE UP (ref 0–5)
UROBILINOGEN FLD QL: 0.2 MG/DL — SIGNIFICANT CHANGE UP (ref 0.2–1)
WBC # BLD: 9.44 K/UL — SIGNIFICANT CHANGE UP (ref 3.8–10.5)
WBC # FLD AUTO: 9.44 K/UL — SIGNIFICANT CHANGE UP (ref 3.8–10.5)
WBC CLUMPS # UR AUTO: PRESENT
WBC UR QL: 73 /HPF — HIGH (ref 0–5)

## 2025-05-10 PROCEDURE — 99233 SBSQ HOSP IP/OBS HIGH 50: CPT | Mod: GC

## 2025-05-10 PROCEDURE — G0545: CPT

## 2025-05-10 PROCEDURE — 99223 1ST HOSP IP/OBS HIGH 75: CPT | Mod: GC

## 2025-05-10 RX ORDER — CEFTRIAXONE 500 MG/1
1000 INJECTION, POWDER, FOR SOLUTION INTRAMUSCULAR; INTRAVENOUS EVERY 24 HOURS
Refills: 0 | Status: COMPLETED | OUTPATIENT
Start: 2025-05-11 | End: 2025-05-12

## 2025-05-10 RX ORDER — HYDROMORPHONE/SOD CHLOR,ISO/PF 2 MG/10 ML
2 SYRINGE (ML) INJECTION EVERY 6 HOURS
Refills: 0 | Status: DISCONTINUED | OUTPATIENT
Start: 2025-05-10 | End: 2025-05-14

## 2025-05-10 RX ADMIN — Medication 50 MILLILITER(S): at 18:59

## 2025-05-10 RX ADMIN — METOPROLOL SUCCINATE 100 MILLIGRAM(S): 50 TABLET, EXTENDED RELEASE ORAL at 06:28

## 2025-05-10 RX ADMIN — Medication 2 MILLIGRAM(S): at 22:00

## 2025-05-10 RX ADMIN — TAMSULOSIN HYDROCHLORIDE 0.4 MILLIGRAM(S): 0.4 CAPSULE ORAL at 22:18

## 2025-05-10 RX ADMIN — AZTREONAM 50 MILLIGRAM(S): 2 INJECTION, POWDER, LYOPHILIZED, FOR SOLUTION INTRAMUSCULAR; INTRAVENOUS at 18:37

## 2025-05-10 RX ADMIN — APIXABAN 5 MILLIGRAM(S): 2.5 TABLET, FILM COATED ORAL at 18:37

## 2025-05-10 RX ADMIN — Medication 325 MILLIGRAM(S): at 11:22

## 2025-05-10 RX ADMIN — Medication 25 MILLIGRAM(S): at 14:19

## 2025-05-10 RX ADMIN — Medication 1300 MILLIGRAM(S): at 22:18

## 2025-05-10 RX ADMIN — Medication 2 MILLIGRAM(S): at 11:22

## 2025-05-10 RX ADMIN — Medication 25 MILLIGRAM(S): at 06:28

## 2025-05-10 RX ADMIN — Medication 1300 MILLIGRAM(S): at 06:28

## 2025-05-10 RX ADMIN — Medication 1 TABLET(S): at 11:21

## 2025-05-10 RX ADMIN — POLYETHYLENE GLYCOL 3350 17 GRAM(S): 17 POWDER, FOR SOLUTION ORAL at 06:28

## 2025-05-10 RX ADMIN — Medication 2 MILLIGRAM(S): at 11:52

## 2025-05-10 RX ADMIN — Medication 25 MILLIGRAM(S): at 22:17

## 2025-05-10 RX ADMIN — Medication 650 MILLIGRAM(S): at 22:22

## 2025-05-10 RX ADMIN — Medication 2 TABLET(S): at 22:18

## 2025-05-10 RX ADMIN — AZTREONAM 50 MILLIGRAM(S): 2 INJECTION, POWDER, LYOPHILIZED, FOR SOLUTION INTRAMUSCULAR; INTRAVENOUS at 06:28

## 2025-05-10 RX ADMIN — Medication 1300 MILLIGRAM(S): at 14:20

## 2025-05-10 RX ADMIN — APIXABAN 5 MILLIGRAM(S): 2.5 TABLET, FILM COATED ORAL at 06:28

## 2025-05-10 RX ADMIN — Medication 50 MILLILITER(S): at 20:24

## 2025-05-10 RX ADMIN — Medication 2 MILLIGRAM(S): at 20:25

## 2025-05-10 NOTE — PROGRESS NOTE ADULT - PROBLEM SELECTOR PLAN 10
-DVT Ppx: c/w Eliquis  -Diet: CC/nephro diet  -Fall Precautions  -Dispo: eventual [facility]  -Wound Care d/c recs: "Pt will need Group 2 mattress on hospital bed and ROHO cushion for wheel chair upon discharge home"  -PT eval: likely back to [facility] - Prostate CA s/p radiation; unclear if this is contributing to urinary obstruction and retention   - Home Myrbetriq 50mg daily    > f/u Outpatient  - Holding Myrebetiq for now - will dc upon discharge as likely contributing to significant retention on admission  - Also w/ reported history BPH, started Flomax 0.4mg qhs

## 2025-05-10 NOTE — PROVIDER CONTACT NOTE (OTHER) - ASSESSMENT
Patient calm, alert AOx4, in no acute distress. /82, O2 96, RR 18 and heartrate 106. Patient denies chest pain, denies chills denies dyspnea, denies itching, denies nausea. No wheezing noted. Patient complaint of left knee pain
Patient is in no distress
no s/s of sweats, chills
2700 cc removed, pt denies any pain, urine brown/maroon color
no s/s of distress, pt voided 250 cc prior to bladder scan
Pt AOx4.. Pt educated on the benefits/indications of full head t toe assessment and  Turning and positioned every 2 hours.
yes
yes

## 2025-05-10 NOTE — PROVIDER CONTACT NOTE (OTHER) - ACTION/TREATMENT ORDERED:
PRN tylenol given
T6 Deysi Costello made aware
Tylenol PRN given
cooling blanket ordered
MD made aware. MD said to continue the PRBCs. Tylenol admistered for temperature and knee pain.
Pt to be straight cath'd
maldonado ordered

## 2025-05-10 NOTE — PROGRESS NOTE ADULT - PROBLEM SELECTOR PLAN 8
- Home metoprolol succinate ER 100mg daily    > c/w home metoprolol succinate 100mg PO QD   > c/w eliquis 5mg as above > c/w home hydralazine 25mg TID  > c/w home metoprolol succinate 100mg PO QD

## 2025-05-10 NOTE — PROGRESS NOTE ADULT - PROBLEM SELECTOR PLAN 7
> c/w home hydralazine 25mg TID  > c/w home metoprolol succinate 100mg PO QD - A1c: 4.6 4/3/2025  - Home Januvia 50 mg daily    > blood glucose well controlled while inpatient  > Hold home Januvia, likely will discontinue upon d/c

## 2025-05-10 NOTE — PROVIDER CONTACT NOTE (OTHER) - REASON
pt bladder scan showing > 400 cc
pt straight cath'd- 2700 cc removed
Patient has a fever of 100.9F
Temp 100.9F
pt temp 101.6 and tachycardic
pt temp of 100.9 and tachycardic
Unable to do full head t toe assessment and Pt refusing to Turned and positioned every 2 hours.

## 2025-05-10 NOTE — PROGRESS NOTE ADULT - ASSESSMENT
65 YO M w/ PMH of DM2 (on Januvia), HTN, HLD, gout, A-fib (on Eliquis), CKD not on dialysis (last documented Cr 2.82 on 4/5/25), severe OA, prostate cancer (s/p radiation), recent admission for fall discharged to [facility] rehab, admitted for elevated sCr s/p 2.7L removal and Woods placement - suspected post-renal DEEDEE, course c/b UTI 63 YO M w/ PMH of DM2 (on Januvia), HTN, HLD, gout, A-fib (on Eliquis), CKD not on dialysis (last documented Cr 2.82 on 4/5/25), severe OA, prostate cancer (s/p radiation), recent admission for fall discharged to [facility] rehab, admitted for elevated sCr s/p 2.7L removal and Woods placement - suspected post-renal DEEDEE, course c/b UTI on Aztreonam, febrile ON 5/9 - workup in progress

## 2025-05-10 NOTE — PROGRESS NOTE ADULT - SUBJECTIVE AND OBJECTIVE BOX
NEPHROLOGY-[facility] [phone]        Patient seen and examined on room air, no new complaints.         MEDICATIONS  (STANDING):  apixaban 5 milliGRAM(s) Oral two times a day  aztreonam  IVPB      aztreonam  IVPB 1000 milliGRAM(s) IV Intermittent every 12 hours  ferrous    sulfate 325 milliGRAM(s) Oral daily  hydrALAZINE 25 milliGRAM(s) Oral every 8 hours  metoprolol succinate  milliGRAM(s) Oral daily  Nephro-shannon 1 Tablet(s) Oral daily  polyethylene glycol 3350 17 Gram(s) Oral two times a day  senna 2 Tablet(s) Oral at bedtime  sodium bicarbonate 1300 milliGRAM(s) Oral three times a day  sodium chloride 0.9%. 1000 milliLiter(s) (50 mL/Hr) IV Continuous <Continuous>  tamsulosin 0.4 milliGRAM(s) Oral at bedtime      VITAL:  T(C): , Max: 38.3 (25 @ 20:34)  T(F): , Max: 100.9 (25 @ 20:34)  HR: 91 (05-10-25 @ 04:18)  BP: 157/93 (05-10-25 @ 04:18)  BP(mean): --  RR: 18 (05-10-25 @ 04:18)  SpO2: 97% (05-10-25 @ 04:18)  Wt(kg): --    I and O's:     @ 07:01  -  05-10 @ 07:00  --------------------------------------------------------  IN: 50 mL / OUT: 2250 mL / NET: -2200 mL          PHYSICAL EXAM:    Constitutional: NAD  Neck:  No JVD  Respiratory: CTAB/L  Cardiovascular: S1 and S2  Gastrointestinal: BS+, soft, NT/ND  Extremities: No peripheral edema  Neurological: reduce generalized strength   Psychiatric: Normal mood, normal affect  : + Maldonado  Skin: No rashes  Access: Not applicable    LABS:                        8.4    9.44  )-----------( 192      ( 10 May 2025 07:18 )             27.5     05-10    143  |  107  |  106[H]  ----------------------------<  107[H]  3.9   |  21[L]  |  4.48[H]    Ca    8.5      10 May 2025 07:18  Phos  3.9     05-10  Mg     2.0     05-10    TPro  6.0  /  Alb  2.6[L]  /  TBili  0.6  /  DBili  x   /  AST  28  /  ALT  26  /  AlkPhos  154[H]  05-10          Urine Studies:  Urinalysis Basic - ( 10 May 2025 09:09 )    Color: Yellow / Appearance: Slightly Cloudy / S.012 / pH: x  Gluc: x / Ketone: Negative mg/dL  / Bili: Negative / Urobili: 0.2 mg/dL   Blood: x / Protein: 100 mg/dL / Nitrite: Negative   Leuk Esterase: Large / RBC: 110 /HPF / WBC 73 /HPF   Sq Epi: x / Non Sq Epi: 1 /HPF / Bacteria: Negative /HPF      IMPRESSION: 64M w/ HTN, gout, AFib, and CKD4-5, 25 p/w DEEDEE on CKD  (1)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

## 2025-05-10 NOTE — PROGRESS NOTE ADULT - PROBLEM SELECTOR PLAN 9
- Prostate CA s/p radiation; unclear if this is contributing to urinary obstruction and retention   - Home Myrbetriq 50mg daily    > f/u Outpatient  - Holding Myrebetiq for now - will dc upon discharge as likely contributing to significant retention on admission  - Also w/ reported history BPH, started Flomax 0.4mg qhs - Home metoprolol succinate ER 100mg daily    > c/w home metoprolol succinate 100mg PO QD   > c/w eliquis 5mg as above

## 2025-05-10 NOTE — CONSULT NOTE ADULT - ASSESSMENT
64-year-old male, with a history of type 2 diabetes on Januvia, hypertension, hyperlipidemia, gout, A-fib on Eliquis, CKD not on dialysis (last documented Cr 2.82 on 4/5/25), severe OA, recent admission for fall discharged to [facility] rehab, brought in by EMS from [facility] for abnormal lab results. Found to have elevated BUN/Cr 130/5.98. Patient's kidney function has been slowly increasing over the last few weeks HC c/b persistent fevers since 5/5 tmaz 101.6 ucx on 5/5 ecoli which was largely pansensitive(R Amp). Bld cx neg x 2 Leukocytosis 11 on 5/6 only ID consulted for abx mgnt,    ALLergy: PCN and clinda    Last fever: 5/9 100.9  UCX ecoli  Bld cx neg  Currently on Aztreonam 5/6-->  Got Vanc x 1 dose 5/5    #Persistent fevers on abx  # complex renal cysts increased in size  #anemia  INCOMPLETED******************************************************** 64-year-old male, with a history of type 2 diabetes on Januvia, hypertension, hyperlipidemia, gout, A-fib on Eliquis, CKD not on dialysis (last documented Cr 2.82 on 4/5/25), severe OA, recent admission for fall discharged to [facility] rehab, brought in by EMS from [facility] for abnormal lab results. Found to have elevated BUN/Cr 130/5.98. Patient's kidney function has been slowly increasing over the last few weeks HC c/b persistent fevers since 5/5 tmaz 101.6 ucx on 5/5 ecoli which was largely pansensitive(R Amp). Bld cx neg x 2 Leukocytosis 11 on 5/6 only ID consulted for abx mgnt,    ALLergy: Clinda +PCN-states that he has taken amoxicillin in the past     Last fever: 5/9 100.9  UCX ecoli -no dysuric symptoms  Bld cx neg  Currently on Aztreonam 5/6-->  Got Vanc x 1 dose 5/5    #Persistent fevers on abx  #R knee pain??gout  #anemia    Plan  Overall pt nontoxic appearing with recent fever in the setting of post PRBC transfusion  Can d/c Aztreonam after last dose tonight (today is day 5) and monitor off abx  If patient becomes febrile again can consider R knee gout work up then.  Continue to monitor wbc  Continue to monitor for fevers  Plan d/w Dr. Abdalla and floor provider

## 2025-05-10 NOTE — PROGRESS NOTE ADULT - PROBLEM SELECTOR PLAN 11
-DVT Ppx: c/w Eliquis  -Diet: CC/nephro diet  -Fall Precautions  -Dispo: eventual [facility]  -Wound Care d/c recs: "Pt will need Group 2 mattress on hospital bed and ROHO cushion for wheel chair upon discharge home"  -PT eval: likely back to [facility]

## 2025-05-10 NOTE — PROVIDER CONTACT NOTE (OTHER) - NAME OF MD/NP/PA/DO NOTIFIED:
Deysi Costello MD
Alyson Dyer
Deysi Costello MD
Manisha Ross
T6 Deysi Costello
hudson clark
Mitch Garsia

## 2025-05-10 NOTE — CONSULT NOTE ADULT - SUBJECTIVE AND OBJECTIVE BOX
Patient is a 64y old  Male who presents with a chief complaint of DEEDEE on CKD, normocytic anemia (10 May 2025 07:34)    HPI:  65 YO M w/ PMH of DM2 (on Januvia), HTN, HLD, gout, A-fib on Eliquis, CKD not on dialysis (last documented Cr 2.82 on 25), severe OA, prostate cancer (s/p radiation), recent admission for fall discharged to [facility] rehab, [facility] (Cr 5.98).    Pt was hospitalized last month for a fall. During the prior hospitalization, also found worsened Cr, but unclear whether it was progressive CKD or DEEDEE on CKD. Pt had been in [facility] rehab since prior discharge. Today, found to have elevated BUN/Cr 130/5.98, Hgb 7.1 (baseline ~10) at [facility]. Pt endorses polyuria and L knee pain, which are his chronic conditions. Also reports pain in buttocks, unchanged from prior. Denies other symptoms, including melena/hematochezia, chest pain, dyspnea, lightheadedness, dizziness, neurological symptoms, abdominal pain, back pain, nausea, vomiting, diarrhea or constipation. (02 May 2025 01:44)     REVIEW OF SYSTEMS  [  ] ROS unobtainable because:    [ x ] All other systems negative except as noted below  Constitutional:  [ ] fever [ ] chills  [ ] weight loss  [ ]night sweat  [ ]poor appetite/PO intake [ ]fatigue   Skin:  [ ] rash [ ] phlebitis	  Eyes: [ ] icterus [ ] pain  [ ] discharge	  ENMT: [ ] sore throat  [ ] thrush [ ] ulcers [ ] exudates [ ]anosmia  Respiratory: [ ] dyspnea [ ] hemoptysis [ ] cough [ ] sputum	  Cardiovascular:  [ ] chest pain [ ] palpitations [ ] edema	  Gastrointestinal:  [ ] nausea [ ] vomiting [ ] diarrhea [ ] constipation [ ] pain	  Genitourinary:  [ ] dysuria [ ] frequency [ ] hematuria [ ] discharge [ ] flank pain  [ ] incontinence  Musculoskeletal:  [ ] myalgias [ ] arthralgias [ ] arthritis  [ ] back pain  Neurological:  [ ] headache [ ] weakness [ ] seizures  [ ] confusion/altered mental status  prior hospital charts reviewed [V]  primary team notes reviewed [V]  other consultant notes reviewed [V]    PAST MEDICAL & SURGICAL HISTORY:  HTN - Hypertension      Hyperlipidemia      Inguinal Hernia  Right       Obese      Retinal Detachment  bilateral ,       Cataract  bilateral      Quadriceps Tendon Rupture        Chronic atrial fibrillation      Type 2 diabetes mellitus      Prostate cancer      Gout      Chronic kidney disease, unspecified CKD stage      History of Arthroscopy of Right Knee  x2 , , Left knee       Right Quadriceps Tendon Rupture repair        History of Cataract Surgery  Left , Right       Hernia  rih repair            SOCIAL HISTORY:  - Denied smoking/vaping/alcohol/recreational drug use    FAMILY HISTORY:      Allergies  penicillin (Unknown)  clindamycin (Rash)        ANTIMICROBIALS:  aztreonam  IVPB    aztreonam  IVPB 1000 every 12 hours      ANTIMICROBIALS (past 90 days):  MEDICATIONS  (STANDING):    aztreonam  IVPB   50 mL/Hr IV Intermittent (25 @ 11:10)    aztreonam  IVPB   50 mL/Hr IV Intermittent (05-10-25 @ 06:28)   50 mL/Hr IV Intermittent (25 @ 18:38)   50 mL/Hr IV Intermittent (25 @ 05:28)   50 mL/Hr IV Intermittent (25 @ 17:21)   50 mL/Hr IV Intermittent (25 @ 05:17)   50 mL/Hr IV Intermittent (25 @ 17:21)   50 mL/Hr IV Intermittent (25 @ 06:03)   50 mL/Hr IV Intermittent (25 @ 17:24)    vancomycin  IVPB   250 mL/Hr IV Intermittent (25 @ 18:12)        OTHER MEDS:   MEDICATIONS  (STANDING):  acetaminophen     Tablet .. 650 every 6 hours PRN  apixaban 5 two times a day  hydrALAZINE 25 every 8 hours  HYDROmorphone   Tablet 2 every 6 hours PRN  lactulose Syrup 10 once PRN  methocarbamol 750 every 12 hours PRN  metoprolol succinate  daily  polyethylene glycol 3350 17 two times a day  senna 2 at bedtime  tamsulosin 0.4 at bedtime  traMADol 50 every 12 hours PRN      VITALS:  Vital Signs Last 24 Hrs  T(F): 97.8 (05-10-25 @ 04:18), Max: 101.9 (25 @ 19:14)    Vital Signs Last 24 Hrs  HR: 91 (05-10-25 @ 04:18) (91 - 96)  BP: 157/93 (05-10-25 @ 04:18) (121/83 - 157/93)  RR: 18 (05-10-25 @ 04:18)  SpO2: 97% (05-10-25 @ 04:18) (97% - 98%)  Wt(kg): --    EXAM:    GA: NAD, AOx3  HEENT: oral cavity no lesion  CV: nl S1/S2, no RMG  Lungs: CTAB, No distress  Abd: BS+, soft, nontender, no rebounding pain  Ext: no edema  Neuro: No focal deficits  Skin: Intact  IV: no phlebitis  Labs:                        8.4    9.44  )-----------( 192      ( 10 May 2025 07:18 )             27.5     05-10    143  |  107  |  106[H]  ----------------------------<  107[H]  3.9   |  21[L]  |  4.48[H]    Ca    8.5      10 May 2025 07:18  Phos  3.9     05-10  Mg     2.0     05-10    TPro  6.0  /  Alb  2.6[L]  /  TBili  0.6  /  DBili  x   /  AST  28  /  ALT  26  /  AlkPhos  154[H]  05-10    WBC Trend:  WBC Count: 9.44 (05-10-25 @ 07:18)  WBC Count: 8.60 (25 @ 06:47)  WBC Count: 9.92 (25 @ 07:10)  WBC Count: 10.32 (25 @ 08:45)      Creatine Trend:  Creatinine: 4.48 (05-10)  Creatinine: 4.69 ()  Creatinine: 4.84 ()  Creatinine: 5.06 ()    Liver Biochemical Testing Trend:  Alanine Aminotransferase (ALT/SGPT): 26 (05-10)  Alanine Aminotransferase (ALT/SGPT): 23 ()  Alanine Aminotransferase (ALT/SGPT): 22 ()  Alanine Aminotransferase (ALT/SGPT): 15 ()  Alanine Aminotransferase (ALT/SGPT): 15 ()  Aspartate Aminotransferase (AST/SGOT): 28 (05-10-25 @ 07:18)  Aspartate Aminotransferase (AST/SGOT): 30 (25 @ 06:47)  Aspartate Aminotransferase (AST/SGOT): 29 (25 @ 07:05)  Aspartate Aminotransferase (AST/SGOT): 14 (25 @ 08:45)  Aspartate Aminotransferase (AST/SGOT): 20 (25 @ 06:58)  Bilirubin Total: 0.6 (-10)  Bilirubin Total: 0.5 ()  Bilirubin Total: 0.6 (-)  Bilirubin Total: 0.7 (-)  Bilirubin Total: 0.8 ()    Trend LDH      Urinalysis Basic - ( 10 May 2025 09:09 )    Color: Yellow / Appearance: Slightly Cloudy / S.012 / pH: x  Gluc: x / Ketone: Negative mg/dL  / Bili: Negative / Urobili: 0.2 mg/dL   Blood: x / Protein: 100 mg/dL / Nitrite: Negative   Leuk Esterase: Large / RBC: 110 /HPF / WBC 73 /HPF   Sq Epi: x / Non Sq Epi: 1 /HPF / Bacteria: Negative /HPF      MICROBIOLOGY:    MRSA PCR Result.: Christiano (25 @ 18:00)      Culture - Urine (collected 05 May 2025 16:23)  Source: Clean Catch Clean Catch (Midstream)  Final Report:    >100,000 CFU/ml Escherichia coli  Organism: Escherichia coli  Organism: Escherichia coli    Sensitivities:      Method Type: BRO      -  Amoxicillin/Clavulanic Acid: S <=8/4      -  Ampicillin: R >16 These ampicillin results predict results for amoxicillin      -  Ampicillin/Sulbactam: I 16/8      -  Aztreonam: S <=4      -  Cefazolin: S 4 For uncomplicated UTI with K. pneumoniae, E. coli, or P. mirablis: BRO <=16 is sensitive and BRO >=32 is resistant. This also predicts results for oral agents cefaclor, cefdinir, cefpodoxime, cefprozil, cefuroxime axetil, cephalexin and locarbef for uncomplicated UTI. Note that some isolates may be susceptible to these agents while testing resistant to cefazolin.      -  Cefepime: S <=2      -  Cefoxitin: S <=8      -  Ceftriaxone: S <=1      -  Cefuroxime: S 8      -  Ciprofloxacin: S <=0.25      -  Ertapenem: S <=0.5      -  Gentamicin: S <=2      -  Imipenem: S <=1      -  Levofloxacin: S <=0.5      -  Meropenem: S <=1      -  Nitrofurantoin: S <=32 Should not be used to treat pyelonephritis      -  Piperacillin/Tazobactam: S <=8      -  Tigecycline: S <=2 Interpretations based on FDA breakpoints      -  Tobramycin: S <=2      -  Trimethoprim/Sulfamethoxazole: S <=0.5/9.5    Culture - Blood (collected 05 May 2025 10:50)  Source: Blood Blood-Peripheral  Preliminary Report:    No growth at 4 days    Culture - Blood (collected 05 May 2025 10:50)  Source: Blood Blood-Peripheral  Preliminary Report:    No growth at 4 days    Urinalysis with Rflx Culture (collected 2025 07:30)        A1C with Estimated Average Glucose Result: 4.6 % (25 @ 07:38)      CSF:      RADIOLOGY:  < from: CT Abdomen and Pelvis No Cont (25 @ 09:35) >  FINDINGS: Evaluation is less sensitive without IV contrast.  LOWER CHEST: Mild dependent atelectasis. Coronary artery calcifications.  Hypodensity of the blood pool in relation to left ventricular myocardium   suggests underlying anemia.  Mild bilateral gynecomastia.    LIVER: Few scattered hypodense lesions are incompletely assessed without   IV contrast  BILE DUCTS: Normal caliber.  GALLBLADDER: Mildly distended. No wall thickening or surrounding   inflammation. Filled with sludge. Contains small layering gallstones  SPLEEN: Within normal size limits.  PANCREAS: Unremarkable as visualized.  ADRENALS: Bilateral nodular thickening  KIDNEYS/URETERS: No renal stones or hydronephrosis.  Bilateral renal lesions of varying size complexity are incompletely   assessed without IV contrast.    BLADDER: Minimally distended.Diffuse wall thickening. Contains Woods   catheter and intraluminal air  REPRODUCTIVE ORGANS: Prostate within normal size limits. Stereotactic   markers within the prostate    BOWEL: No bowel obstruction. Colonic diverticulosis.  Large stool ball measuring 8.3 cm within the rectum.  PERITONEUM/RETROPERITONEUM: Presacral edema.  VESSELS: Mild atherosclerosis  LYMPH NODES: Mild aortocaval, right common iliac and external iliac   lymphadenopathy  ABDOMINAL WALL: No significant acute abnormality.  BONES: Multilevel degenerative changes throughout the spine. Advanced   osteoarthritis of both hip joints.    IMPRESSION: Limited exam without IV contrast unable to diagnostically   evaluate the bilateral renal lesions of varying size and complexity.   Recommend renal protocol MRI with IV contrast if clinically desired    < end of copied text >   Patient is a 64y old  Male who presents with a chief complaint of DEEDEE on CKD, normocytic anemia (10 May 2025 07:34)    HPI:  63 YO M w/ PMH of DM2 (on uvia), HTN, HLD, gout, A-fib on Eliquis, CKD not on dialysis (last documented Cr 2.82 on 25), severe OA, prostate cancer (s/p radiation), recent admission for fall discharged to [facility] rehab, [facility] for [facility] lab results (Cr 5.98).    Pt was hospitalized last month for a fall. During the prior hospitalization, also found worsened Cr, but unclear whether it was progressive CKD or DEEDEE on CKD. Pt had been in [facility] rehab since prior discharge. Today, found to have elevated BUN/Cr 130/5.98, Hgb 7.1 (baseline ~10) at [facility]. Pt endorses polyuria and L knee pain, which are his chronic conditions. Also reports pain in buttocks, unchanged from prior. Denies other symptoms, including melena/hematochezia, chest pain, dyspnea, lightheadedness, dizziness, neurological symptoms, abdominal pain, back pain, nausea, vomiting, diarrhea or constipation. (02 May 2025 01:44)     REVIEW OF SYSTEMS  [  ] ROS unobtainable because:    [ x ] All other systems negative except as noted below  Constitutional:  [ ] fever [ ] chills  [ ] weight loss  [ ]night sweat  [ ]poor appetite/PO intake [ ]fatigue   Skin:  [ ] rash [ ] phlebitis	  Eyes: [ ] icterus [ ] pain  [ ] discharge	  ENMT: [ ] sore throat  [ ] thrush [ ] ulcers [ ] exudates [ ]anosmia  Respiratory: [ ] dyspnea [ ] hemoptysis [ ] cough [ ] sputum	  Cardiovascular:  [ ] chest pain [ ] palpitations [ ] edema	  Gastrointestinal:  [ ] nausea [ ] vomiting [ ] diarrhea [ ] constipation [ ] pain	  Genitourinary:  [ ] dysuria [ ] frequency [ ] hematuria [ ] discharge [ ] flank pain  [ ] incontinence  Musculoskeletal:  [ ] myalgias [ ] arthralgias [ ] arthritis  [ ] back pain [x] r knee pain  Neurological:  [ ] headache [ ] weakness [ ] seizures  [ ] confusion/altered mental status  prior hospital charts reviewed [V]  primary team notes reviewed [V]  other consultant notes reviewed [V]    PAST MEDICAL & SURGICAL HISTORY:  HTN - Hypertension      Hyperlipidemia      Inguinal Hernia  Right       Obese      Retinal Detachment  bilateral ,       Cataract  bilateral      Quadriceps Tendon Rupture        Chronic atrial fibrillation      Type 2 diabetes mellitus      Prostate cancer      Gout      Chronic kidney disease, unspecified CKD stage      History of Arthroscopy of Right Knee  x2 , , Left knee       Right Quadriceps Tendon Rupture repair        History of Cataract Surgery  Left , Right 2000      Hernia  rih repair            SOCIAL HISTORY:  - Denied smoking/vaping/alcohol/recreational drug use    FAMILY HISTORY:      Allergies  penicillin (Unknown)  clindamycin (Rash)        ANTIMICROBIALS:  aztreonam  IVPB    aztreonam  IVPB 1000 every 12 hours      ANTIMICROBIALS (past 90 days):  MEDICATIONS  (STANDING):    aztreonam  IVPB   50 mL/Hr IV Intermittent (25 @ 11:10)    aztreonam  IVPB   50 mL/Hr IV Intermittent (05-10-25 @ 06:28)   50 mL/Hr IV Intermittent (25 @ 18:38)   50 mL/Hr IV Intermittent (25 @ 05:28)   50 mL/Hr IV Intermittent (25 @ 17:21)   50 mL/Hr IV Intermittent (25 @ 05:17)   50 mL/Hr IV Intermittent (25 @ 17:21)   50 mL/Hr IV Intermittent (25 @ 06:03)   50 mL/Hr IV Intermittent (25 @ 17:24)    vancomycin  IVPB   250 mL/Hr IV Intermittent (25 @ 18:12)        OTHER MEDS:   MEDICATIONS  (STANDING):  acetaminophen     Tablet .. 650 every 6 hours PRN  apixaban 5 two times a day  hydrALAZINE 25 every 8 hours  HYDROmorphone   Tablet 2 every 6 hours PRN  lactulose Syrup 10 once PRN  methocarbamol 750 every 12 hours PRN  metoprolol succinate  daily  polyethylene glycol 3350 17 two times a day  senna 2 at bedtime  tamsulosin 0.4 at bedtime  traMADol 50 every 12 hours PRN      VITALS:  Vital Signs Last 24 Hrs  T(F): 97.8 (05-10-25 @ 04:18), Max: 101.9 (25 @ 19:14)    Vital Signs Last 24 Hrs  HR: 91 (05-10-25 @ 04:18) (91 - 96)  BP: 157/93 (05-10-25 @ 04:18) (121/83 - 157/93)  RR: 18 (05-10-25 @ 04:18)  SpO2: 97% (05-10-25 @ 04:18) (97% - 98%)  Wt(kg): --    EXAM:    GA: NAD, AOx3  HEENT: oral cavity no lesion  CV: nl S1/S2, no RMG  Lungs: CTAB, No distress  Abd: BS+, soft, nontender, no rebounding pain  Ext: no edema  Neuro: No focal deficits  Skin: Intact  IV: no phlebitis  Labs:                        8.4    9.44  )-----------( 192      ( 10 May 2025 07:18 )             27.5     05-10    143  |  107  |  106[H]  ----------------------------<  107[H]  3.9   |  21[L]  |  4.48[H]    Ca    8.5      10 May 2025 07:18  Phos  3.9     05-10  Mg     2.0     05-10    TPro  6.0  /  Alb  2.6[L]  /  TBili  0.6  /  DBili  x   /  AST  28  /  ALT  26  /  AlkPhos  154[H]  05-10    WBC Trend:  WBC Count: 9.44 (05-10-25 @ 07:18)  WBC Count: 8.60 (25 @ 06:47)  WBC Count: 9.92 (25 @ 07:10)  WBC Count: 10.32 (25 @ 08:45)      Creatine Trend:  Creatinine: 4.48 (05-10)  Creatinine: 4.69 ()  Creatinine: 4.84 ()  Creatinine: 5.06 ()    Liver Biochemical Testing Trend:  Alanine Aminotransferase (ALT/SGPT): 26 (05-10)  Alanine Aminotransferase (ALT/SGPT): 23 ()  Alanine Aminotransferase (ALT/SGPT): 22 ()  Alanine Aminotransferase (ALT/SGPT): 15 ()  Alanine Aminotransferase (ALT/SGPT): 15 ()  Aspartate Aminotransferase (AST/SGOT): 28 (05-10-25 @ 07:18)  Aspartate Aminotransferase (AST/SGOT): 30 (25 @ 06:47)  Aspartate Aminotransferase (AST/SGOT): 29 (25 @ 07:05)  Aspartate Aminotransferase (AST/SGOT): 14 (25 @ 08:45)  Aspartate Aminotransferase (AST/SGOT): 20 (25 @ 06:58)  Bilirubin Total: 0.6 (05-10)  Bilirubin Total: 0.5 ()  Bilirubin Total: 0.6 ()  Bilirubin Total: 0.7 ()  Bilirubin Total: 0.8 ()    Trend LDH      Urinalysis Basic - ( 10 May 2025 09:09 )    Color: Yellow / Appearance: Slightly Cloudy / S.012 / pH: x  Gluc: x / Ketone: Negative mg/dL  / Bili: Negative / Urobili: 0.2 mg/dL   Blood: x / Protein: 100 mg/dL / Nitrite: Negative   Leuk Esterase: Large / RBC: 110 /HPF / WBC 73 /HPF   Sq Epi: x / Non Sq Epi: 1 /HPF / Bacteria: Negative /HPF      MICROBIOLOGY:    MRSA [facility].: [facility] (25 @ 18:00)      Culture - Urine (collected 05 May 2025 16:23)  Source: Clean Catch Clean Catch (Midstream)  Final Report:    >100,000 CFU/ml Escherichia coli  Organism: Escherichia coli  Organism: Escherichia coli    Sensitivities:      Method Type: BRO      -  Amoxicillin/Clavulanic Acid: S <=8/4      -  Ampicillin: R >16 These ampicillin results predict results for amoxicillin      -  Ampicillin/Sulbactam: I 16/8      -  Aztreonam: S <=4      -  Cefazolin: S 4 For uncomplicated UTI with K. pneumoniae, E. coli, or P. mirablis: BRO <=16 is sensitive and BRO >=32 is resistant. This also predicts results for oral agents cefaclor, cefdinir, cefpodoxime, cefprozil, cefuroxime axetil, cephalexin and locarbef for uncomplicated UTI. Note that some isolates may be susceptible to these agents while testing resistant to cefazolin.      -  Cefepime: S <=2      -  Cefoxitin: S <=8      -  Ceftriaxone: S <=1      -  Cefuroxime: S 8      -  Ciprofloxacin: S <=0.25      -  Ertapenem: S <=0.5      -  Gentamicin: S <=2      -  Imipenem: S <=1      -  Levofloxacin: S <=0.5      -  Meropenem: S <=1      -  Nitrofurantoin: S <=32 Should not be used to treat pyelonephritis      -  Piperacillin/Tazobactam: S <=8      -  Tigecycline: S <=2 Interpretations based on FDA breakpoints      -  Tobramycin: S <=2      -  Trimethoprim/Sulfamethoxazole: S <=0.5/9.5    Culture - Blood (collected 05 May 2025 10:50)  Source: Blood Blood-Peripheral  Preliminary Report:    No growth at 4 days    Culture - Blood (collected 05 May 2025 10:50)  Source: Blood Blood-Peripheral  Preliminary Report:    No growth at 4 days    Urinalysis with Rflx Culture (collected 2025 07:30)        A1C with Estimated Average Glucose Result: 4.6 % (25 @ 07:38)      CSF:      RADIOLOGY:  < from: CT Abdomen and Pelvis No Cont (25 @ 09:35) >  FINDINGS: Evaluation is less sensitive without IV contrast.  LOWER CHEST: Mild dependent atelectasis. Coronary artery calcifications.  Hypodensity of the blood pool in relation to left ventricular myocardium   suggests underlying anemia.  Mild bilateral gynecomastia.    LIVER: Few scattered hypodense lesions are incompletely assessed without   IV contrast  BILE DUCTS: Normal caliber.  GALLBLADDER: Mildly distended. No wall thickening or surrounding   inflammation. Filled with sludge. Contains small layering gallstones  SPLEEN: Within normal size limits.  PANCREAS: Unremarkable as visualized.  ADRENALS: Bilateral nodular thickening  KIDNEYS/URETERS: No renal stones or hydronephrosis.  Bilateral renal lesions of varying size complexity are incompletely   assessed without IV contrast.    BLADDER: Minimally distended.Diffuse wall thickening. Contains Woods   catheter and intraluminal air  REPRODUCTIVE ORGANS: Prostate within normal size limits. Stereotactic   markers within the prostate    BOWEL: No bowel obstruction. Colonic diverticulosis.  Large stool ball measuring 8.3 cm within the rectum.  PERITONEUM/RETROPERITONEUM: Presacral edema.  VESSELS: Mild atherosclerosis  LYMPH NODES: Mild aortocaval, right common iliac and external iliac   lymphadenopathy  ABDOMINAL WALL: No significant acute abnormality.  BONES: Multilevel degenerative changes throughout the spine. Advanced   osteoarthritis of both hip joints.    IMPRESSION: Limited exam without IV contrast unable to diagnostically   evaluate the bilateral renal lesions of varying size and complexity.   Recommend renal protocol MRI with IV contrast if clinically desired    < end of copied text >   Patient is a 64y old  Male who presents with a chief complaint of DEEDEE on CKD, normocytic anemia (10 May 2025 07:34)    HPI:  63 YO M w/ PMH of DM2 (on Januvia), HTN, HLD, gout, A-fib on Eliquis, CKD not on dialysis (last documented Cr 2.82 on 25), severe OA, prostate cancer (s/p radiation), recent admission for fall discharged to [facility] rehab, brought in by EMS from [facility] for abnormal lab results (Cr 5.98).    Pt was hospitalized last month for a fall. During the prior hospitalization, also found worsened Cr, but unclear whether it was progressive CKD or DEEDEE on CKD. Pt had been in [facility] rehab since prior discharge. Today, found to have elevated BUN/Cr 130/5.98, Hgb 7.1 (baseline ~10) at [facility]. Pt endorses polyuria and L knee pain, which are his chronic conditions. Also reports pain in buttocks, unchanged from prior. Denies other symptoms, including melena/hematochezia, chest pain, dyspnea, lightheadedness, dizziness, neurological symptoms, abdominal pain, back pain, nausea, vomiting, diarrhea or constipation. (02 May 2025 01:44)     REVIEW OF SYSTEMS  [  ] ROS unobtainable because:    [ x ] All other systems negative except as noted below      Constitutional:  [x ] fever [ ] chills  Skin:  [ ] rash [ ] phlebitis	  Eyes: [ ] icterus   ENMT: [ ] sore throat  [ ] thrush  Respiratory: [ ] dyspnea [ ] cough [ ] sputum	  Cardiovascular:  [ ] chest pain 	  Gastrointestinal:  [ ] nausea [ ] vomiting [ ] diarrhea [ ] constipation [ ] pain	  Genitourinary:  [ ] dysuria [ ] frequency   Musculoskeletal:  [ ] myalgias  [x] lt knee pain  Neurological:  [ ] headache  [ ] confusion/altered mental status  Extremities: + edema.         prior hospital charts reviewed [V]  primary team notes reviewed [V]  other consultant notes reviewed [V]      PAST MEDICAL & SURGICAL HISTORY:  HTN - Hypertension      Hyperlipidemia      Inguinal Hernia  Right       Obese      Retinal Detachment  bilateral ,       Cataract  bilateral      Quadriceps Tendon Rupture        Chronic atrial fibrillation      Type 2 diabetes mellitus      Prostate cancer      Gout      Chronic kidney disease, unspecified CKD stage      History of Arthroscopy of Right Knee  x2 , , Left knee       Right Quadriceps Tendon Rupture repair        History of Cataract Surgery  Left 01, Right 2000      Hernia  [facility] repair            SOCIAL HISTORY:  - from home.         FAMILY HISTORY:  pt adopted        Allergies  penicillin (Unknown)  clindamycin (Rash)        ANTIMICROBIALS:  aztreonam  IVPB    aztreonam  IVPB 1000 every 12 hours      ANTIMICROBIALS (past 90 days):  MEDICATIONS  (STANDING):    aztreonam  IVPB   50 mL/Hr IV Intermittent (25 @ 11:10)    aztreonam  IVPB   50 mL/Hr IV Intermittent (05-10-25 @ 06:28)   50 mL/Hr IV Intermittent (25 @ 18:38)   50 mL/Hr IV Intermittent (25 @ 05:28)   50 mL/Hr IV Intermittent (25 @ 17:21)   50 mL/Hr IV Intermittent (25 @ 05:17)   50 mL/Hr IV Intermittent (25 @ 17:21)   50 mL/Hr IV Intermittent (25 @ 06:03)   50 mL/Hr IV Intermittent (25 @ 17:24)    vancomycin  IVPB   250 mL/Hr IV Intermittent (25 @ 18:12)        OTHER MEDS:   MEDICATIONS  (STANDING):  acetaminophen     Tablet .. 650 every 6 hours PRN  apixaban 5 two times a day  hydrALAZINE 25 every 8 hours  HYDROmorphone   Tablet 2 every 6 hours PRN  lactulose Syrup 10 once PRN  methocarbamol 750 every 12 hours PRN  metoprolol succinate  daily  polyethylene glycol 3350 17 two times a day  senna 2 at bedtime  tamsulosin 0.4 at bedtime  traMADol 50 every 12 hours PRN      VITALS:  Vital Signs Last 24 Hrs  T(F): 97.8 (05-10-25 @ 04:18), Max: 101.9 (25 @ 19:14)    Vital Signs Last 24 Hrs  HR: 91 (05-10-25 @ 04:18) (91 - 96)  BP: 157/93 (05-10-25 @ 04:18) (121/83 - 157/93)  RR: 18 (05-10-25 @ 04:18)  SpO2: 97% (05-10-25 @ 04:18) (97% - 98%)  Wt(kg): --    EXAM:    GA: NAD, AOx3  Eyes: No discharge   ENT: oral cavity no lesion  CV: nl S1/S2,   Lungs: No distress, + ai entry b/l  Abd: soft, nontender  : + maldonado   Ext: + edema  MSK: Lt knee with some warmth, no erythema, ROM limited due to pain, some tenderness.   Skin: Intact  IV: no phlebitis      Labs:                        8.4    9.44  )-----------( 192      ( 10 May 2025 07:18 )             27.5     05-10    143  |  107  |  106[H]  ----------------------------<  107[H]  3.9   |  21[L]  |  4.48[H]    Ca    8.5      10 May 2025 07:18  Phos  3.9     05-10  Mg     2.0     05-10    TPro  6.0  /  Alb  2.6[L]  /  TBili  0.6  /  DBili  x   /  AST  28  /  ALT  26  /  AlkPhos  154[H]  05-10    WBC Trend:  WBC Count: 9.44 (05-10-25 @ 07:18)  WBC Count: 8.60 (25 @ 06:47)  WBC Count: 9.92 (25 @ 07:10)  WBC Count: 10.32 (25 @ 08:45)      Creatine Trend:  Creatinine: 4.48 (05-10)  Creatinine: 4.69 ()  Creatinine: 4.84 ()  Creatinine: 5.06 ()    Liver Biochemical Testing Trend:  Alanine Aminotransferase (ALT/SGPT): 26 (05-10)  Alanine Aminotransferase (ALT/SGPT): 23 ()  Alanine Aminotransferase (ALT/SGPT): 22 ()  Alanine Aminotransferase (ALT/SGPT): 15 ()  Alanine Aminotransferase (ALT/SGPT): 15 ()  Aspartate Aminotransferase (AST/SGOT): 28 (05-10-25 @ 07:18)  Aspartate Aminotransferase (AST/SGOT): 30 (25 @ 06:47)  Aspartate Aminotransferase (AST/SGOT): 29 (25 @ 07:05)  Aspartate Aminotransferase (AST/SGOT): 14 (25 @ 08:45)  Aspartate Aminotransferase (AST/SGOT): 20 (25 @ 06:58)  Bilirubin Total: 0.6 (-10)  Bilirubin Total: 0.5 (-09)  Bilirubin Total: 0.6 (-08)  Bilirubin Total: 0.7 (-07)  Bilirubin Total: 0.8 (-)    Trend LDH      Urinalysis Basic - ( 10 May 2025 09:09 )    Color: Yellow / Appearance: Slightly Cloudy / S.012 / pH: x  Gluc: x / Ketone: Negative mg/dL  / Bili: Negative / Urobili: 0.2 mg/dL   Blood: x / Protein: 100 mg/dL / Nitrite: Negative   Leuk Esterase: Large / RBC: 110 /HPF / WBC 73 /HPF   Sq Epi: x / Non Sq Epi: 1 /HPF / Bacteria: Negative /HPF      MICROBIOLOGY:    MRSA [facility] Result.: [facility] (25 @ 18:00)      Culture - Urine (collected 05 May 2025 16:23)  Source: Clean Catch Clean Catch (Midstream)  Final Report:    >100,000 CFU/ml Escherichia coli  Organism: Escherichia coli  Organism: Escherichia coli    Sensitivities:      Method Type: BRO      -  Amoxicillin/Clavulanic Acid: S <=8/4      -  Ampicillin: R >16 These ampicillin results predict results for amoxicillin      -  Ampicillin/Sulbactam: I 16/8      -  Aztreonam: S <=4      -  Cefazolin: S 4 For uncomplicated UTI with K. pneumoniae, E. coli, or P. mirablis: BRO <=16 is sensitive and BRO >=32 is resistant. This also predicts results for oral agents cefaclor, cefdinir, cefpodoxime, cefprozil, cefuroxime axetil, cephalexin and locarbef for uncomplicated UTI. Note that some isolates may be susceptible to these agents while testing resistant to cefazolin.      -  Cefepime: S <=2      -  Cefoxitin: S <=8      -  Ceftriaxone: S <=1      -  Cefuroxime: S 8      -  Ciprofloxacin: S <=0.25      -  Ertapenem: S <=0.5      -  Gentamicin: S <=2      -  Imipenem: S <=1      -  Levofloxacin: S <=0.5      -  Meropenem: S <=1      -  Nitrofurantoin: S <=32 Should not be used to treat pyelonephritis      -  Piperacillin/Tazobactam: S <=8      -  Tigecycline: S <=2 Interpretations based on FDA breakpoints      -  Tobramycin: S <=2      -  Trimethoprim/Sulfamethoxazole: S <=0.5/9.5    Culture - Blood (collected 05 May 2025 10:50)  Source: Blood Blood-Peripheral  Preliminary Report:    No growth at 4 days    Culture - Blood (collected 05 May 2025 10:50)  Source: Blood Blood-Peripheral  Preliminary Report:    No growth at 4 days    Urinalysis with Rflx Culture (collected 2025 07:30)        A1C with Estimated Average Glucose Result: 4.6 % (25 @ 07:38)          RADIOLOGY: Imaging reviewed personally and interpretation as mentioned below.       < from: CT Abdomen and Pelvis No Cont (25 @ 09:35) >  FINDINGS: Evaluation is less sensitive without IV contrast.  LOWER CHEST: Mild dependent atelectasis. Coronary artery calcifications.  Hypodensity of the blood pool in relation to left ventricular myocardium   suggests underlying anemia.  Mild bilateral gynecomastia.    LIVER: Few scattered hypodense lesions are incompletely assessed without   IV contrast  BILE DUCTS: Normal caliber.  GALLBLADDER: Mildly distended. No wall thickening or surrounding   inflammation. Filled with sludge. Contains small layering gallstones  SPLEEN: Within normal size limits.  PANCREAS: Unremarkable as visualized.  ADRENALS: Bilateral nodular thickening  KIDNEYS/URETERS: No renal stones or hydronephrosis.  Bilateral renal lesions of varying size complexity are incompletely   assessed without IV contrast.    BLADDER: Minimally distended.Diffuse wall thickening. Contains Maldonado   catheter and intraluminal air  REPRODUCTIVE ORGANS: Prostate within normal size limits. Stereotactic   markers within the prostate    BOWEL: No bowel obstruction. Colonic diverticulosis.  Large stool ball measuring 8.3 cm within the rectum.  PERITONEUM/RETROPERITONEUM: Presacral edema.  VESSELS: Mild atherosclerosis  LYMPH NODES: Mild aortocaval, right common iliac and external iliac   lymphadenopathy  ABDOMINAL WALL: No significant acute abnormality.  BONES: Multilevel degenerative changes throughout the spine. Advanced   osteoarthritis of both hip joints.    IMPRESSION: Limited exam without IV contrast unable to diagnostically   evaluate the bilateral renal lesions of varying size and complexity.   Recommend renal protocol MRI with IV contrast if clinically desired

## 2025-05-10 NOTE — PROGRESS NOTE ADULT - PROBLEM SELECTOR PLAN 1
- Per nephro note in prior hospitalization: unclear if DEEDEE on CKD vs slowly progressive CKD.   - 5/1/25 Cr 5.85 at admission. (In prior hospitalization, Cr 3.9 on admission. Per Dr. Woo, baseline Cr is 2.5)  - Nephrology consulted - Dr. Hilton May (Nephrologist at [facility])  - 4/25/25 Renal US: multiple bilateral renal cysts, which are increased in size and number compared to the prior CT scan - c/f neoplasm  - Urine Lytes (05/02): FeNa 4.2% suggestive of intrinsic disease  - s/p maldonado catheter placement in ER with 2.7L fluid removed  - Ddx: Likely DEEDEE on CKD 2/2 post-renal DEEDEE s/p 2.7L fluid removed on maldonado catheter; now possibly also with component of pre-renal DEEDEE due to large volume UOP after maldonado placement --> overall DEEDEE improving    > c/w indwelling maldonado (05/01 - present) - will consider TOV  > daily BMP/Mg/Phos  > c/w bicarb 1300mg PO TID (up-titrated 05/05)  > s/p Sevelamer, discontinued 5/9  > c/w strict intake/output Q6H  > appreciate Nephrology recommendations

## 2025-05-10 NOTE — PROGRESS NOTE ADULT - PROBLEM SELECTOR PLAN 3
- 4/25/25 Renal US: multiple bilateral renal cysts, which are increased in size and number compared to the prior CT scan - c/f neoplasm  - 05/02/25 CTAP w/o contrast: Limited exam without IV contrast unable to diagnostically evaluate the bilateral renal lesions of varying size and complexity. Recommend renal protocol MRI with IV contrast if clinically desired    > Plan for renal protocol MRI with IV contrast when kidney fx improves. Hospital course c/b delirium; pt alert and oriented x3 however intermittently confused. Suspect delirium iso prolonged hospitalization, limited activity 2/2 knee pain. Family concerned for depression    - BH eval appreciated -- may benefit from outpatient grief therapy (loss of twin brother)   - Delirium precautions  - Tx of acute infection as above, pain control, melatonin

## 2025-05-10 NOTE — CONSULT NOTE ADULT - NS ATTEND AMEND GEN_ALL_CORE FT
64-year-old male, with a history of type 2 diabetes on Januvia, hypertension, hyperlipidemia, gout, A-fib on Eliquis, CKD not on dialysis (last documented Cr 2.82 on 4/5/25), severe OA, recent admission for fall discharged to [facility] rehab, brought in by EMS from [facility] for abnormal lab results. Found to have elevated BUN/Cr 130/5.98. Patient's kidney function has been slowly increasing over the last few weeks HC c/b persistent fevers since 5/5 tmaz 101.6 ucx on 5/5 ecoli which was largely pansensitive(R Amp). Bld cx neg x 2 Leukocytosis 11 on 5/6 only ID consulted for abx mgnt,    ALLergy: Clinda +PCN-states that he has taken amoxicillin in the past     Last fever: 5/9 100.9  UCX ecoli -no dysuric symptoms  Bld cx neg  Currently on Aztreonam 5/6-->  Got Vanc x 1 dose 5/5    #fever  #CAUTI   #Allergy to PCN   #R knee pain??gout    pt with no localizing symptom except the lt knee pain.   U/A abnormal but pt with maldonado catheter ? significance of pyuria in setting of maldonado.   pt did receive blood transfusion, ? fever post transfusion      Plan  Overall pt nontoxic appearing   switch aztreonam to ceftriaxone to finish 7 days total, pt has tolerated amoxicillin in past without any problems.   If patient becomes febrile again can consider R knee gout work up then.  Continue to monitor wbc  f/u repeat blood cx  f/u urine cx   limited RVP negative       Randi Zamudio  Please contact through MS Teams   If no response or past 5 pm/weekend call [phone].

## 2025-05-10 NOTE — PROGRESS NOTE ADULT - SUBJECTIVE AND OBJECTIVE BOX
Patient is a 64y old  Male who presents with a chief complaint of DEEDEE on CKD, normocytic anemia (09 May 2025 08:34)      INTERVAL HX:  T100.9 ON, UA + RVP + Blood Cx ordered    Allergies:  penicillin (Unknown)  clindamycin (Rash)    Medications:  acetaminophen     Tablet .. 650 milliGRAM(s) Oral every 6 hours PRN  apixaban 5 milliGRAM(s) Oral two times a day  aztreonam  IVPB      aztreonam  IVPB 1000 milliGRAM(s) IV Intermittent every 12 hours  ferrous    sulfate 325 milliGRAM(s) Oral daily  hydrALAZINE 25 milliGRAM(s) Oral every 8 hours  HYDROmorphone   Tablet 2 milliGRAM(s) Oral every 6 hours PRN  lactulose Syrup 10 Gram(s) Oral once PRN  lidocaine   4% Patch 1 Patch Transdermal daily PRN  methocarbamol 750 milliGRAM(s) Oral every 12 hours PRN  metoprolol succinate  milliGRAM(s) Oral daily  Nephro-shannon 1 Tablet(s) Oral daily  polyethylene glycol 3350 17 Gram(s) Oral two times a day  senna 2 Tablet(s) Oral at bedtime  sodium bicarbonate 1300 milliGRAM(s) Oral three times a day  sodium chloride 0.9%. 1000 milliLiter(s) IV Continuous <Continuous>  tamsulosin 0.4 milliGRAM(s) Oral at bedtime  traMADol 50 milliGRAM(s) Oral every 12 hours PRN    Vitals:  T(C): 36.6 (05-10-25 @ 04:18), Max: 38.3 (05-09-25 @ 20:34)  HR: 91 (05-10-25 @ 04:18) (91 - 96)  BP: 157/93 (05-10-25 @ 04:18) (121/83 - 157/93)  RR: 18 (05-10-25 @ 04:18) (18 - 18)  SpO2: 97% (05-10-25 @ 04:18) (97% - 98%)  I/O's:    05-09-25 @ 07:01  -  05-10-25 @ 07:00  --------------------------------------------------------  IN: 0 mL / OUT: 2250 mL / NET: -2250 mL      Physical Exam:    Labs:                        7.0    8.60  )-----------( 187      ( 09 May 2025 06:47 )             23.7     05-09    143  |  107  |  97[H]  ----------------------------<  109[H]  4.0   |  20[L]  |  4.69[H]    Ca    8.3[L]      09 May 2025 06:47  Phos  4.3     05-09  Mg     1.9     05-09    TPro  5.7[L]  /  Alb  2.3[L]  /  TBili  0.5  /  DBili  x   /  AST  30  /  ALT  23  /  AlkPhos  148[H]  05-09        Radiology/Procedures: Reviewed.   Patient is a 64y old  Male who presents with a chief complaint of DEEDEE on CKD, normocytic anemia (09 May 2025 08:34)      INTERVAL HX:  T100.9 ON, UA + RVP + Blood Cx ordered  Pt seen and examined at bedside. Reports no new symptoms or urinary complaints. Discussed plan of care extensively. Questions answered    Allergies:  penicillin (Unknown)  clindamycin (Rash)    Medications:  acetaminophen     Tablet .. 650 milliGRAM(s) Oral every 6 hours PRN  apixaban 5 milliGRAM(s) Oral two times a day  aztreonam  IVPB      aztreonam  IVPB 1000 milliGRAM(s) IV Intermittent every 12 hours  ferrous    sulfate 325 milliGRAM(s) Oral daily  hydrALAZINE 25 milliGRAM(s) Oral every 8 hours  HYDROmorphone   Tablet 2 milliGRAM(s) Oral every 6 hours PRN  lactulose Syrup 10 Gram(s) Oral once PRN  lidocaine   4% Patch 1 Patch Transdermal daily PRN  methocarbamol 750 milliGRAM(s) Oral every 12 hours PRN  metoprolol succinate  milliGRAM(s) Oral daily  Nephro-shannon 1 Tablet(s) Oral daily  polyethylene glycol 3350 17 Gram(s) Oral two times a day  senna 2 Tablet(s) Oral at bedtime  sodium bicarbonate 1300 milliGRAM(s) Oral three times a day  sodium chloride 0.9%. 1000 milliLiter(s) IV Continuous <Continuous>  tamsulosin 0.4 milliGRAM(s) Oral at bedtime  traMADol 50 milliGRAM(s) Oral every 12 hours PRN    Vitals:  T(C): 36.6 (05-10-25 @ 04:18), Max: 38.3 (05-09-25 @ 20:34)  HR: 91 (05-10-25 @ 04:18) (91 - 96)  BP: 157/93 (05-10-25 @ 04:18) (121/83 - 157/93)  RR: 18 (05-10-25 @ 04:18) (18 - 18)  SpO2: 97% (05-10-25 @ 04:18) (97% - 98%)  I/O's:    05-09-25 @ 07:01  -  05-10-25 @ 07:00  --------------------------------------------------------  IN: 0 mL / OUT: 2250 mL / NET: -2250 mL      Physical Exam:  GENERAL: NAD, resting in bed  HEAD: normocephalic, atraumatic  CARDIAC: regular rate and rhythm, normal S1S2  PULM: normal breath sounds, clear to ascultation bilaterally, no rales, rhonchi, wheezing  GI: Abd soft, nondistended, nontender, no rebound tenderness, no guarding, no rigidity  : +Woods draining urine  NEURO: no focal motor or sensory deficits, AAOx3  MSK - trace edema       Labs:                        7.0    8.60  )-----------( 187      ( 09 May 2025 06:47 )             23.7     05-09    143  |  107  |  97[H]  ----------------------------<  109[H]  4.0   |  20[L]  |  4.69[H]    Ca    8.3[L]      09 May 2025 06:47  Phos  4.3     05-09  Mg     1.9     05-09    TPro  5.7[L]  /  Alb  2.3[L]  /  TBili  0.5  /  DBili  x   /  AST  30  /  ALT  23  /  AlkPhos  148[H]  05-09        Radiology/Procedures: Reviewed.

## 2025-05-10 NOTE — PROGRESS NOTE ADULT - PROBLEM SELECTOR PLAN 6
- A1c: 4.6 4/3/2025  - Home Januvia 50 mg daily    > blood glucose well controlled while inpatient  > Hold home Januvia, likely will discontinue upon d/c - Recent hospitalization after mechanical fall.  - Chronic L knee pain due to severe OA  - Home pain as-needed pain meds include: Acetaminophen; Tramadol 50mg Q8H for pain (4-6); methocarbamol 750mg BID; lidocaine patch.    > c/w PRN Lidocaine patch  > PO pain regimen as needed: Tylenol 650mg Q6H for mild pain PRN, Tramadol 50mg BID for moderate pain PRN (renally-adjusted); methocarbamol 750mg BID for muscle spasm PRN. Dilaudid 2mg q6 PRN for severe pain and dressing changes (started 05/03)  > c/w Fall precaution  > c/w Wound care recs  > f/u PT eval --> likely back to [facility]   > f/u Nutrition consult

## 2025-05-10 NOTE — PROGRESS NOTE ADULT - PROBLEM SELECTOR PLAN 2
Febrile overnight 5/4, TMax 101.2. No complaint of dysuria but cloudy urine noted in Woods bag, UA grossly (+). RVP negative, CXR clr.    - S/p Vancomycin 750mg 5/5  - C/w Aztreonam, plan for 5d course (5/6-5/10) (penicillin allergy)  - Urine culture with pan-sensitive E. coli  - Blood Cx NGTD Febrile overnight 5/4, TMax 101.2. No complaint of dysuria but cloudy urine noted in Woods bag, UA grossly (+).    - S/p Vancomycin 750mg 5/5  - C/w Aztreonam, initially planned for 5d course (5/6-5/10) (penicillin allergy); urine culture with pan-sensitive E. coli, BCX 5/5 NGTD  - Fever ON 5/9 -- repeat UA obtained, culture ordered, ID consulted for further antibiotic recs given presence of Woods catheter

## 2025-05-10 NOTE — PROGRESS NOTE ADULT - PROBLEM SELECTOR PLAN 4
- Hgb trend: 10.7 (4/5) -> 8.6 (4/14) -> 8.5 (05/01)  - Pt denies melena or hematochezia  - Home ferrous sulfate 325 mg (65mg iron) daily  - Iron studies c/w AOCD  - Reticulocyte index: 0.5, likely 2/2 CKD   - Possibly progressive ACD d/t CKD.    > Hgb 7.0 on 5/5, 1/2u pRBC x2 ordered, Retacrit 10k SQ x1 ordered  > Hgb 7.0 on 5/9, additional 1u ordered  > Trend CBC  > Maintain active T&S (last 05/04)  > c/w home ferrous sulfate 325mg PO QD - 4/25/25 Renal US: multiple bilateral renal cysts, which are increased in size and number compared to the prior CT scan - c/f neoplasm  - 05/02/25 CTAP w/o contrast: Limited exam without IV contrast unable to diagnostically evaluate the bilateral renal lesions of varying size and complexity. Recommend renal protocol MRI with IV contrast if clinically desired    > Plan for renal protocol MRI with IV contrast when kidney fx improves.

## 2025-05-10 NOTE — PROGRESS NOTE ADULT - PROBLEM SELECTOR PLAN 5
Subjective:       Patient ID: Juliette Seo is a 69 y.o. female.    Chief Complaint: Back Pain    HPI  Pt with persistent LBP s/p lumbar epidural injection last week.  Pain radiates to RLQ, groin.  Worse with movement, cough.  Relieved with Percocet. Also c/o incontinence when walking.  Review of Systems    Objective:      Physical Exam   Constitutional: She appears well-developed. No distress.   HENT:   Head: Normocephalic.   Eyes: EOM are normal.   Neck: Normal range of motion. No tracheal deviation present.   Cardiovascular: Normal rate, regular rhythm and intact distal pulses.    Pulmonary/Chest: Effort normal. No respiratory distress.   Abdominal: Soft. Bowel sounds are normal. She exhibits no distension and no mass. There is tenderness (R groin). There is no rebound and no guarding. No hernia.   Musculoskeletal: Normal range of motion. She exhibits tenderness (R SI joint). She exhibits no edema.   Neurological: She is alert. No cranial nerve deficit. She exhibits normal muscle tone. Coordination normal.   - SLR   Skin: Skin is warm and dry. No rash noted. She is not diaphoretic. No erythema.   Psychiatric: She has a normal mood and affect. Her behavior is normal.   Vitals reviewed.      Assessment:       1. Spinal stenosis of lumbar region    2. Stress incontinence, female        Plan:       Juliette was seen today for back pain.    Diagnoses and all orders for this visit:    Spinal stenosis of lumbar region   Cont Percocet   Gabapentin 300 tid    Stress incontinence, female   Kegel exercises    Other orders  -     gabapentin (NEURONTIN) 300 MG capsule; Take 1 capsule (300 mg total) by mouth 3 (three) times daily.      Return if symptoms worsen or fail to improve.       - Recent hospitalization after mechanical fall.  - Chronic L knee pain due to severe OA  - Home pain as-needed pain meds include: Acetaminophen; Tramadol 50mg Q8H for pain (4-6); methocarbamol 750mg BID; lidocaine patch.    > c/w PRN Lidocaine patch  > PO pain regimen as needed: Tylenol 650mg Q6H for mild pain PRN, Tramadol 50mg BID for moderate pain PRN (renally-adjusted); methocarbamol 750mg BID for muscle spasm PRN. Dilaudid 2mg q6 PRN for severe pain and dressing changes (started 05/03)  > c/w Fall precaution  > c/w Wound care recs  > f/u PT eval --> likely back to [facility]   > f/u Nutrition consult - Hgb trend: 10.7 (4/5) -> 8.6 (4/14) -> 8.5 (05/01)  - Pt denies melena or hematochezia  - Home ferrous sulfate 325 mg (65mg iron) daily  - Iron studies c/w AOCD  - Reticulocyte index: 0.5, likely 2/2 CKD   - Possibly progressive ACD d/t CKD.    > Hgb 7.0 on 5/5, 1/2u pRBC x2 ordered, Retacrit 10k SQ x1 ordered  > Hgb 7.0 on 5/9, additional 1u ordered  > Trend CBC  > Maintain active T&S  > c/w home ferrous sulfate 325mg PO QD

## 2025-05-10 NOTE — PROVIDER CONTACT NOTE (OTHER) - BACKGROUND
Prostate cancer   CKD  DM2
pt admitted for worsening kidney function
PMH of DEEDEE, UTI etc
pt admitted for worsening kidney function
pt last temp was 101.6 and PRN tylenol was given
pt spiked fevers overnight; BC sent and UA/ UC sent 5/5

## 2025-05-11 LAB
ALBUMIN SERPL ELPH-MCNC: 2.4 G/DL — LOW (ref 3.3–5)
ALP SERPL-CCNC: 145 U/L — HIGH (ref 40–120)
ALT FLD-CCNC: 27 U/L — SIGNIFICANT CHANGE UP (ref 10–45)
ANION GAP SERPL CALC-SCNC: 20 MMOL/L — HIGH (ref 5–17)
AST SERPL-CCNC: 36 U/L — SIGNIFICANT CHANGE UP (ref 10–40)
BILIRUB SERPL-MCNC: 0.5 MG/DL — SIGNIFICANT CHANGE UP (ref 0.2–1.2)
BLD GP AB SCN SERPL QL: NEGATIVE — SIGNIFICANT CHANGE UP
BUN SERPL-MCNC: 102 MG/DL — HIGH (ref 7–23)
CALCIUM SERPL-MCNC: 8.4 MG/DL — SIGNIFICANT CHANGE UP (ref 8.4–10.5)
CHLORIDE SERPL-SCNC: 108 MMOL/L — SIGNIFICANT CHANGE UP (ref 96–108)
CO2 SERPL-SCNC: 19 MMOL/L — LOW (ref 22–31)
CREAT SERPL-MCNC: 4.32 MG/DL — HIGH (ref 0.5–1.3)
CULTURE RESULTS: NO GROWTH — SIGNIFICANT CHANGE UP
EGFR: 14 ML/MIN/1.73M2 — LOW
EGFR: 14 ML/MIN/1.73M2 — LOW
GLUCOSE SERPL-MCNC: 89 MG/DL — SIGNIFICANT CHANGE UP (ref 70–99)
HCT VFR BLD CALC: 26.5 % — LOW (ref 39–50)
HGB BLD-MCNC: 8 G/DL — LOW (ref 13–17)
MAGNESIUM SERPL-MCNC: 1.9 MG/DL — SIGNIFICANT CHANGE UP (ref 1.6–2.6)
MCHC RBC-ENTMCNC: 28.5 PG — SIGNIFICANT CHANGE UP (ref 27–34)
MCHC RBC-ENTMCNC: 30.2 G/DL — LOW (ref 32–36)
MCV RBC AUTO: 94.3 FL — SIGNIFICANT CHANGE UP (ref 80–100)
NRBC BLD AUTO-RTO: 0 /100 WBCS — SIGNIFICANT CHANGE UP (ref 0–0)
PHOSPHATE SERPL-MCNC: 3.8 MG/DL — SIGNIFICANT CHANGE UP (ref 2.5–4.5)
PLATELET # BLD AUTO: 196 K/UL — SIGNIFICANT CHANGE UP (ref 150–400)
POTASSIUM SERPL-MCNC: 4.2 MMOL/L — SIGNIFICANT CHANGE UP (ref 3.5–5.3)
POTASSIUM SERPL-SCNC: 4.2 MMOL/L — SIGNIFICANT CHANGE UP (ref 3.5–5.3)
PROT SERPL-MCNC: 5.6 G/DL — LOW (ref 6–8.3)
RBC # BLD: 2.81 M/UL — LOW (ref 4.2–5.8)
RBC # FLD: 15.5 % — HIGH (ref 10.3–14.5)
RH IG SCN BLD-IMP: POSITIVE — SIGNIFICANT CHANGE UP
SODIUM SERPL-SCNC: 147 MMOL/L — HIGH (ref 135–145)
SPECIMEN SOURCE: SIGNIFICANT CHANGE UP
WBC # BLD: 8.87 K/UL — SIGNIFICANT CHANGE UP (ref 3.8–10.5)
WBC # FLD AUTO: 8.87 K/UL — SIGNIFICANT CHANGE UP (ref 3.8–10.5)

## 2025-05-11 PROCEDURE — 99233 SBSQ HOSP IP/OBS HIGH 50: CPT | Mod: GC

## 2025-05-11 RX ADMIN — TAMSULOSIN HYDROCHLORIDE 0.4 MILLIGRAM(S): 0.4 CAPSULE ORAL at 21:41

## 2025-05-11 RX ADMIN — Medication 25 MILLIGRAM(S): at 05:18

## 2025-05-11 RX ADMIN — POLYETHYLENE GLYCOL 3350 17 GRAM(S): 17 POWDER, FOR SOLUTION ORAL at 09:04

## 2025-05-11 RX ADMIN — Medication 650 MILLIGRAM(S): at 00:00

## 2025-05-11 RX ADMIN — CEFTRIAXONE 100 MILLIGRAM(S): 500 INJECTION, POWDER, FOR SOLUTION INTRAMUSCULAR; INTRAVENOUS at 05:20

## 2025-05-11 RX ADMIN — Medication 1300 MILLIGRAM(S): at 14:58

## 2025-05-11 RX ADMIN — POLYETHYLENE GLYCOL 3350 17 GRAM(S): 17 POWDER, FOR SOLUTION ORAL at 17:33

## 2025-05-11 RX ADMIN — Medication 650 MILLIGRAM(S): at 22:12

## 2025-05-11 RX ADMIN — APIXABAN 5 MILLIGRAM(S): 2.5 TABLET, FILM COATED ORAL at 17:33

## 2025-05-11 RX ADMIN — Medication 2 TABLET(S): at 21:41

## 2025-05-11 RX ADMIN — Medication 325 MILLIGRAM(S): at 12:29

## 2025-05-11 RX ADMIN — Medication 1300 MILLIGRAM(S): at 05:18

## 2025-05-11 RX ADMIN — Medication 1300 MILLIGRAM(S): at 21:39

## 2025-05-11 RX ADMIN — Medication 1 TABLET(S): at 12:28

## 2025-05-11 RX ADMIN — APIXABAN 5 MILLIGRAM(S): 2.5 TABLET, FILM COATED ORAL at 05:18

## 2025-05-11 RX ADMIN — Medication 25 MILLIGRAM(S): at 21:40

## 2025-05-11 RX ADMIN — Medication 650 MILLIGRAM(S): at 21:42

## 2025-05-11 RX ADMIN — METOPROLOL SUCCINATE 100 MILLIGRAM(S): 50 TABLET, EXTENDED RELEASE ORAL at 05:17

## 2025-05-11 RX ADMIN — Medication 25 MILLIGRAM(S): at 14:58

## 2025-05-11 NOTE — PROGRESS NOTE ADULT - PROBLEM SELECTOR PLAN 2
Febrile overnight 5/4, TMax 101.2. No complaint of dysuria but cloudy urine noted in Woods bag, UA grossly (+).    - S/p Vancomycin 750mg 5/5  - C/w Aztreonam, initially planned for 5d course (5/6-5/10) (penicillin allergy); urine culture with pan-sensitive E. coli, BCX 5/5 NGTD  - Fever ON 5/9 -- repeat UA obtained, culture ordered, ID consulted for further antibiotic recs given presence of Woods catheter Febrile overnight 5/4, TMax 101.2. No complaint of dysuria but cloudy urine noted in Woods bag, UA grossly (+).    - S/p Vancomycin 750mg 5/5  - C/w Aztreonam, initially planned for 5d course (5/6-5/10) (penicillin allergy); urine culture with pan-sensitive E. coli, BCX 5/5 NGTD  - Fever ON 5/9 -- repeat UA obtained, culture ordered, ID consulted for further antibiotic recs given presence of Woods catheter    Plan:  - follow up infectious work up

## 2025-05-11 NOTE — PROGRESS NOTE ADULT - PROBLEM SELECTOR PLAN 10
- Prostate CA s/p radiation; unclear if this is contributing to urinary obstruction and retention   - Home Myrbetriq 50mg daily    > f/u Outpatient  - Holding Myrebetiq for now - will dc upon discharge as likely contributing to significant retention on admission  - Also w/ reported history BPH, started Flomax 0.4mg qhs

## 2025-05-11 NOTE — PROGRESS NOTE ADULT - PROBLEM SELECTOR PLAN 3
Hospital course c/b delirium; pt alert and oriented x3 however intermittently confused. Suspect delirium iso prolonged hospitalization, limited activity 2/2 knee pain. Family concerned for depression    - BH eval appreciated -- may benefit from outpatient grief therapy (loss of twin brother)   - Delirium precautions  - Tx of acute infection as above, pain control, melatonin

## 2025-05-11 NOTE — PROGRESS NOTE ADULT - PROBLEM SELECTOR PLAN 7
- A1c: 4.6 4/3/2025  - Home Januvia 50 mg daily    > blood glucose well controlled while inpatient  > Hold home Januvia, likely will discontinue upon d/c

## 2025-05-11 NOTE — PROGRESS NOTE ADULT - SUBJECTIVE AND OBJECTIVE BOX
NEPHROLOGY-[facility] [phone]        Patient seen and examined afebrile, reports he has good oral intake.         MEDICATIONS  (STANDING):  apixaban 5 milliGRAM(s) Oral two times a day  cefTRIAXone   IVPB 1000 milliGRAM(s) IV Intermittent every 24 hours  ferrous    sulfate 325 milliGRAM(s) Oral daily  hydrALAZINE 25 milliGRAM(s) Oral every 8 hours  metoprolol succinate  milliGRAM(s) Oral daily  Nephro-shannon 1 Tablet(s) Oral daily  polyethylene glycol 3350 17 Gram(s) Oral two times a day  senna 2 Tablet(s) Oral at bedtime  sodium bicarbonate 1300 milliGRAM(s) Oral three times a day  sodium chloride 0.9%. 1000 milliLiter(s) (50 mL/Hr) IV Continuous <Continuous>  tamsulosin 0.4 milliGRAM(s) Oral at bedtime      VITAL:  T(C): , Max: 37.2 (05-10-25 @ 20:38)  T(F): , Max: 99 (05-10-25 @ 20:38)  HR: 115 (05-11-25 @ 05:32)  BP: 142/86 (05-11-25 @ 05:32)  BP(mean): --  RR: 18 (05-11-25 @ 05:32)  SpO2: 97% (05-11-25 @ 05:32)  Wt(kg): --    I and O's:    05-10 @ 07:01  -  05-11 @ 07:00  --------------------------------------------------------  IN: 0 mL / OUT: 750 mL / NET: -750 mL    05-11 @ 07:01  -  05-11 @ 11:46  --------------------------------------------------------  IN: 120 mL / OUT: 0 mL / NET: 120 mL          PHYSICAL EXAM:    Constitutional: NAD  Neck:  No JVD  Respiratory: CTAB/L  Cardiovascular: S1 and S2  Gastrointestinal: BS+, soft, NT/ND  Extremities: No peripheral edema  Neurological: A/O x 3, no focal deficits  Psychiatric: Normal mood, normal affect  : +Maldonado  Skin: No rashes  Access: Not applicable    LABS:                        8.0    8.87  )-----------( 196      ( 11 May 2025 08:11 )             26.5     05-11    147[H]  |  108  |  102[H]  ----------------------------<  89  4.2   |  19[L]  |  4.32[H]    Ca    8.4      11 May 2025 08:11  Phos  3.8     05-11  Mg     1.9     05-11    TPro  5.6[L]  /  Alb  2.4[L]  /  TBili  0.5  /  DBili  x   /  AST  36  /  ALT  27  /  AlkPhos  145[H]  05-11          Urine Studies:  Urinalysis Basic - ( 11 May 2025 08:11 )    Color: x / Appearance: x / SG: x / pH: x  Gluc: 89 mg/dL / Ketone: x  / Bili: x / Urobili: x   Blood: x / Protein: x / Nitrite: x   Leuk Esterase: x / RBC: x / WBC x   Sq Epi: x / Non Sq Epi: x / Bacteria: x          IMPRESSION: 64M w/ HTN, gout, AFib, and CKD4-5, 5/1/25 p/w DEEDEE on CKD  (1)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  (2)DEEDEE - obstructive - improving s/p maldonado placement  (3)Metabolic acidosis - renally mediated -acceptable, on standing PO NaHCO3  (4)Hyperphosphatemia -improving now off binders   (5)CV - acceptable BP/volume  (6)Anemia - s/p PRBCs/Retacrit; on standing PO FeSO4; Hgb declining   (7)ID - UTI - now on Rocephin   (8) - reasonable to attempt TOV at this point  (9)Hypernatremia     RECOMMEND:  (1)Continue NaHCO3 and FeSO4 as ordered  (2)No objection to TOV  (3)Continue abx for GFR<15  (4)BMP+Mg+PO4 daily while admitted        Susie Blutstein NP  [facility]  Office/on call physician: [phone]       NEPHROLOGY-[facility] [phone]        Patient seen and examined afebrile, reports he has good oral intake.         MEDICATIONS  (STANDING):  apixaban 5 milliGRAM(s) Oral two times a day  cefTRIAXone   IVPB 1000 milliGRAM(s) IV Intermittent every 24 hours  ferrous    sulfate 325 milliGRAM(s) Oral daily  hydrALAZINE 25 milliGRAM(s) Oral every 8 hours  metoprolol succinate  milliGRAM(s) Oral daily  Nephro-shannon 1 Tablet(s) Oral daily  polyethylene glycol 3350 17 Gram(s) Oral two times a day  senna 2 Tablet(s) Oral at bedtime  sodium bicarbonate 1300 milliGRAM(s) Oral three times a day  sodium chloride 0.9%. 1000 milliLiter(s) (50 mL/Hr) IV Continuous <Continuous>  tamsulosin 0.4 milliGRAM(s) Oral at bedtime      VITAL:  T(C): , Max: 37.2 (05-10-25 @ 20:38)  T(F): , Max: 99 (05-10-25 @ 20:38)  HR: 115 (05-11-25 @ 05:32)  BP: 142/86 (05-11-25 @ 05:32)  BP(mean): --  RR: 18 (05-11-25 @ 05:32)  SpO2: 97% (05-11-25 @ 05:32)  Wt(kg): --    I and O's:    05-10 @ 07:01  -  05-11 @ 07:00  --------------------------------------------------------  IN: 0 mL / OUT: 750 mL / NET: -750 mL    05-11 @ 07:01  -  05-11 @ 11:46  --------------------------------------------------------  IN: 120 mL / OUT: 0 mL / NET: 120 mL          PHYSICAL EXAM:    Constitutional: NAD  Neck:  No JVD  Respiratory: CTAB/L  Cardiovascular: S1 and S2  Gastrointestinal: BS+, soft, NT/ND  Extremities: No peripheral edema  Neurological: A/O x 3, no focal deficits  Psychiatric: Normal mood, normal affect  : +Maldonado  Skin: No rashes  Access: Not applicable    LABS:                        8.0    8.87  )-----------( 196      ( 11 May 2025 08:11 )             26.5     05-11    147[H]  |  108  |  102[H]  ----------------------------<  89  4.2   |  19[L]  |  4.32[H]    Ca    8.4      11 May 2025 08:11  Phos  3.8     05-11  Mg     1.9     05-11    TPro  5.6[L]  /  Alb  2.4[L]  /  TBili  0.5  /  DBili  x   /  AST  36  /  ALT  27  /  AlkPhos  145[H]  05-11          Urine Studies:  Urinalysis Basic - ( 11 May 2025 08:11 )    Color: x / Appearance: x / SG: x / pH: x  Gluc: 89 mg/dL / Ketone: x  / Bili: x / Urobili: x   Blood: x / Protein: x / Nitrite: x   Leuk Esterase: x / RBC: x / WBC x   Sq Epi: x / Non Sq Epi: x / Bacteria: x          IMPRESSION: 64M w/ HTN, gout, AFib, and CKD4-5, 5/1/25 p/w DEEDEE on CKD  (1)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  (2)DEEDEE - obstructive - improving s/p maldonado placement  (3)Metabolic acidosis - renally mediated -acceptable, on standing PO NaHCO3  (4)Hyperphosphatemia -improving now off binders   (5)CV - acceptable BP/volume  (6)Anemia - s/p PRBCs/Retacrit; on standing PO FeSO4; Hgb declining   (7)ID - UTI - now on Rocephin   (8) - reasonable to attempt TOV at this point  (9)Hypernatremia- NA + rising     RECOMMEND:  (1)Continue NaHCO3 and FeSO4 as ordered  (2)No objection to TOV  (3)Would give D5W @ 75 ml/hr x 13 hrs   (4)Continue abx for GFR<15  (5)BMP+Mg+PO4 daily while admitted        Alexander Jaci AARON  [facility]  Office/on call physician: [phone]

## 2025-05-11 NOTE — PROGRESS NOTE ADULT - ASSESSMENT
63 YO M w/ PMH of DM2 (on Januvia), HTN, HLD, gout, A-fib (on Eliquis), CKD not on dialysis (last documented Cr 2.82 on 4/5/25), severe OA, prostate cancer (s/p radiation), recent admission for fall discharged to [facility] rehab, admitted for elevated sCr s/p 2.7L removal and Woods placement - suspected post-renal DEEDEE, course c/b UTI on Aztreonam, febrile ON 5/9 - workup in progress

## 2025-05-11 NOTE — PROGRESS NOTE ADULT - PROBLEM SELECTOR PLAN 1
- Per nephro note in prior hospitalization: unclear if DEEDEE on CKD vs slowly progressive CKD.   - 5/1/25 Cr 5.85 at admission. (In prior hospitalization, Cr 3.9 on admission. Per Dr. Woo, baseline Cr is 2.5)  - Nephrology consulted - Dr. Hilton May (Nephrologist at [facility])  - 4/25/25 Renal US: multiple bilateral renal cysts, which are increased in size and number compared to the prior CT scan - c/f neoplasm  - Urine Lytes (05/02): FeNa 4.2% suggestive of intrinsic disease  - s/p maldonado catheter placement in ER with 2.7L fluid removed  - Ddx: Likely DEEDEE on CKD 2/2 post-renal DEEDEE s/p 2.7L fluid removed on maldonado catheter; now possibly also with component of pre-renal DEEDEE due to large volume UOP after maldonado placement --> overall DEEDEE improving    Plan:  > c/w indwelling maldonado (05/01 - present) - will consider TOV  > daily BMP/Mg/Phos  > c/w bicarb 1300mg PO TID (up-titrated 05/05)  > s/p Sevelamer, discontinued 5/9  > c/w strict intake/output Q6H  > appreciate Nephrology recommendations

## 2025-05-11 NOTE — PROGRESS NOTE ADULT - SUBJECTIVE AND OBJECTIVE BOX
DATE OF SERVICE: 05-11-25 @ 11:41    Patient is a 64y old  Male who presents with a chief complaint of DEEDEE on CKD, normocytic anemia (10 May 2025 13:53)      SUBJECTIVE / OVERNIGHT EVENTS:    MEDICATIONS  (STANDING):  apixaban 5 milliGRAM(s) Oral two times a day  cefTRIAXone   IVPB 1000 milliGRAM(s) IV Intermittent every 24 hours  ferrous    sulfate 325 milliGRAM(s) Oral daily  hydrALAZINE 25 milliGRAM(s) Oral every 8 hours  metoprolol succinate  milliGRAM(s) Oral daily  Nephro-shannon 1 Tablet(s) Oral daily  polyethylene glycol 3350 17 Gram(s) Oral two times a day  senna 2 Tablet(s) Oral at bedtime  sodium bicarbonate 1300 milliGRAM(s) Oral three times a day  sodium chloride 0.9%. 1000 milliLiter(s) (50 mL/Hr) IV Continuous <Continuous>  tamsulosin 0.4 milliGRAM(s) Oral at bedtime    MEDICATIONS  (PRN):  acetaminophen     Tablet .. 650 milliGRAM(s) Oral every 6 hours PRN Temp greater or equal to 38C (100.4F), Mild Pain (1 - 3)  HYDROmorphone   Tablet 2 milliGRAM(s) Oral every 6 hours PRN Turning in bed/cleaning and/or SEVERE pain  lactulose Syrup 10 Gram(s) Oral once PRN constipation  lidocaine   4% Patch 1 Patch Transdermal daily PRN L knee pain  methocarbamol 750 milliGRAM(s) Oral every 12 hours PRN Muscle Spasm      Vital Signs Last 24 Hrs  T(C): 36.8 (11 May 2025 05:32), Max: 37.2 (10 May 2025 20:38)  T(F): 98.3 (11 May 2025 05:32), Max: 99 (10 May 2025 20:38)  HR: 115 (11 May 2025 05:32) (93 - 115)  BP: 142/86 (11 May 2025 05:32) (132/92 - 151/100)  BP(mean): --  RR: 18 (11 May 2025 05:32) (18 - 18)  SpO2: 97% (11 May 2025 05:32) (97% - 100%)    Parameters below as of 11 May 2025 05:32  Patient On (Oxygen Delivery Method): room air      CAPILLARY BLOOD GLUCOSE        I&O's Summary    10 May 2025 07:01  -  11 May 2025 07:00  --------------------------------------------------------  IN: 0 mL / OUT: 750 mL / NET: -750 mL    11 May 2025 07:01  -  11 May 2025 11:41  --------------------------------------------------------  IN: 120 mL / OUT: 0 mL / NET: 120 mL        PHYSICAL EXAM:  GENERAL: NAD, well-developed  HEAD:  Atraumatic, Normocephalic  EYES: EOMI, PERRLA, conjunctiva and sclera clear  NECK: Supple, No JVD  CHEST/LUNG: Clear to auscultation bilaterally; No wheeze  HEART: Regular rate and rhythm; No murmurs, rubs, or gallops  ABDOMEN: Soft, Nontender, Nondistended; Bowel sounds present  EXTREMITIES:  2+ Peripheral Pulses, No clubbing, cyanosis, or edema  PSYCH: AAOx3  NEUROLOGY: non-focal  SKIN: No rashes or lesions    LABS:                        8.0    8.87  )-----------( 196      ( 11 May 2025 08:11 )             26.5     05-11    147[H]  |  108  |  102[H]  ----------------------------<  89  4.2   |  19[L]  |  4.32[H]    Ca    8.4      11 May 2025 08:11  Phos  3.8     05-11  Mg     1.9     05-11    TPro  5.6[L]  /  Alb  2.4[L]  /  TBili  0.5  /  DBili  x   /  AST  36  /  ALT  27  /  AlkPhos  145[H]  05-11          Urinalysis Basic - ( 11 May 2025 08:11 )    Color: x / Appearance: x / SG: x / pH: x  Gluc: 89 mg/dL / Ketone: x  / Bili: x / Urobili: x   Blood: x / Protein: x / Nitrite: x   Leuk Esterase: x / RBC: x / WBC x   Sq Epi: x / Non Sq Epi: x / Bacteria: x        RADIOLOGY & ADDITIONAL TESTS:    Imaging Personally Reviewed:    Consultant(s) Notes Reviewed:      Care Discussed with Consultants/Other Providers:   DATE OF SERVICE: 05-11-25 @ 11:41    Patient is a 64y old  Male who presents with a chief complaint of DEEDEE on CKD, normocytic anemia (10 May 2025 13:53)      SUBJECTIVE / OVERNIGHT EVENTS: NAEO. Eating well. Had a bowel movement. Pain controlled.    MEDICATIONS  (STANDING):  apixaban 5 milliGRAM(s) Oral two times a day  cefTRIAXone   IVPB 1000 milliGRAM(s) IV Intermittent every 24 hours  ferrous    sulfate 325 milliGRAM(s) Oral daily  hydrALAZINE 25 milliGRAM(s) Oral every 8 hours  metoprolol succinate  milliGRAM(s) Oral daily  Nephro-shannon 1 Tablet(s) Oral daily  polyethylene glycol 3350 17 Gram(s) Oral two times a day  senna 2 Tablet(s) Oral at bedtime  sodium bicarbonate 1300 milliGRAM(s) Oral three times a day  sodium chloride 0.9%. 1000 milliLiter(s) (50 mL/Hr) IV Continuous <Continuous>  tamsulosin 0.4 milliGRAM(s) Oral at bedtime    MEDICATIONS  (PRN):  acetaminophen     Tablet .. 650 milliGRAM(s) Oral every 6 hours PRN Temp greater or equal to 38C (100.4F), Mild Pain (1 - 3)  HYDROmorphone   Tablet 2 milliGRAM(s) Oral every 6 hours PRN Turning in bed/cleaning and/or SEVERE pain  lactulose Syrup 10 Gram(s) Oral once PRN constipation  lidocaine   4% Patch 1 Patch Transdermal daily PRN L knee pain  methocarbamol 750 milliGRAM(s) Oral every 12 hours PRN Muscle Spasm      Vital Signs Last 24 Hrs  T(C): 36.8 (11 May 2025 05:32), Max: 37.2 (10 May 2025 20:38)  T(F): 98.3 (11 May 2025 05:32), Max: 99 (10 May 2025 20:38)  HR: 115 (11 May 2025 05:32) (93 - 115)  BP: 142/86 (11 May 2025 05:32) (132/92 - 151/100)  BP(mean): --  RR: 18 (11 May 2025 05:32) (18 - 18)  SpO2: 97% (11 May 2025 05:32) (97% - 100%)    Parameters below as of 11 May 2025 05:32  Patient On (Oxygen Delivery Method): room air      CAPILLARY BLOOD GLUCOSE        I&O's Summary    10 May 2025 07:01  -  11 May 2025 07:00  --------------------------------------------------------  IN: 0 mL / OUT: 750 mL / NET: -750 mL    11 May 2025 07:01  -  11 May 2025 11:41  --------------------------------------------------------  IN: 120 mL / OUT: 0 mL / NET: 120 mL        PHYSICAL EXAM:  GENERAL: NAD, well-developed  HEAD:  Atraumatic, Normocephalic  EYES: EOMI, conjunctiva and sclera clear  CHEST/LUNG: non labored respirations on room air  ABDOMEN: Soft, Nontender, Nondistended; Bowel sounds present, maldonado draining light yellow urine  EXTREMITIES:  2+ Peripheral Pulses, No clubbing, cyanosis, or edema  PSYCH: AAOx3  NEUROLOGY: non-focal  SKIN: No rashes or lesions    LABS:                        8.0    8.87  )-----------( 196      ( 11 May 2025 08:11 )             26.5     05-11    147[H]  |  108  |  102[H]  ----------------------------<  89  4.2   |  19[L]  |  4.32[H]    Ca    8.4      11 May 2025 08:11  Phos  3.8     05-11  Mg     1.9     05-11    TPro  5.6[L]  /  Alb  2.4[L]  /  TBili  0.5  /  DBili  x   /  AST  36  /  ALT  27  /  AlkPhos  145[H]  05-11          Urinalysis Basic - ( 11 May 2025 08:11 )    Color: x / Appearance: x / SG: x / pH: x  Gluc: 89 mg/dL / Ketone: x  / Bili: x / Urobili: x   Blood: x / Protein: x / Nitrite: x   Leuk Esterase: x / RBC: x / WBC x   Sq Epi: x / Non Sq Epi: x / Bacteria: x        RADIOLOGY & ADDITIONAL TESTS:    Imaging Personally Reviewed:    Consultant(s) Notes Reviewed:      Care Discussed with Consultants/Other Providers:

## 2025-05-11 NOTE — PROGRESS NOTE ADULT - PROBLEM SELECTOR PLAN 5
- Hgb trend: 10.7 (4/5) -> 8.6 (4/14) -> 8.5 (05/01)  - Pt denies melena or hematochezia  - Home ferrous sulfate 325 mg (65mg iron) daily  - Iron studies c/w AOCD  - Reticulocyte index: 0.5, likely 2/2 CKD   - Possibly progressive ACD d/t CKD.    > Hgb 7.0 on 5/5, 1/2u pRBC x2 ordered, Retacrit 10k SQ x1 ordered  > Hgb 7.0 on 5/9, additional 1u ordered  > Trend CBC  > Maintain active T&S  > c/w home ferrous sulfate 325mg PO QD

## 2025-05-12 LAB
ALBUMIN SERPL ELPH-MCNC: 2.5 G/DL — LOW (ref 3.3–5)
ALP SERPL-CCNC: 138 U/L — HIGH (ref 40–120)
ALT FLD-CCNC: 27 U/L — SIGNIFICANT CHANGE UP (ref 10–45)
ANION GAP SERPL CALC-SCNC: 18 MMOL/L — HIGH (ref 5–17)
AST SERPL-CCNC: 29 U/L — SIGNIFICANT CHANGE UP (ref 10–40)
BILIRUB SERPL-MCNC: 0.5 MG/DL — SIGNIFICANT CHANGE UP (ref 0.2–1.2)
BUN SERPL-MCNC: 98 MG/DL — HIGH (ref 7–23)
CALCIUM SERPL-MCNC: 8.5 MG/DL — SIGNIFICANT CHANGE UP (ref 8.4–10.5)
CHLORIDE SERPL-SCNC: 107 MMOL/L — SIGNIFICANT CHANGE UP (ref 96–108)
CO2 SERPL-SCNC: 20 MMOL/L — LOW (ref 22–31)
CREAT SERPL-MCNC: 4.29 MG/DL — HIGH (ref 0.5–1.3)
EGFR: 14 ML/MIN/1.73M2 — LOW
EGFR: 14 ML/MIN/1.73M2 — LOW
GLUCOSE SERPL-MCNC: 102 MG/DL — HIGH (ref 70–99)
HCT VFR BLD CALC: 27.4 % — LOW (ref 39–50)
HGB BLD-MCNC: 8.2 G/DL — LOW (ref 13–17)
MAGNESIUM SERPL-MCNC: 2 MG/DL — SIGNIFICANT CHANGE UP (ref 1.6–2.6)
MCHC RBC-ENTMCNC: 28.7 PG — SIGNIFICANT CHANGE UP (ref 27–34)
MCHC RBC-ENTMCNC: 29.9 G/DL — LOW (ref 32–36)
MCV RBC AUTO: 95.8 FL — SIGNIFICANT CHANGE UP (ref 80–100)
NRBC BLD AUTO-RTO: 0 /100 WBCS — SIGNIFICANT CHANGE UP (ref 0–0)
PHOSPHATE SERPL-MCNC: 4.2 MG/DL — SIGNIFICANT CHANGE UP (ref 2.5–4.5)
PLATELET # BLD AUTO: 194 K/UL — SIGNIFICANT CHANGE UP (ref 150–400)
POTASSIUM SERPL-MCNC: 4.1 MMOL/L — SIGNIFICANT CHANGE UP (ref 3.5–5.3)
POTASSIUM SERPL-SCNC: 4.1 MMOL/L — SIGNIFICANT CHANGE UP (ref 3.5–5.3)
PROT SERPL-MCNC: 5.7 G/DL — LOW (ref 6–8.3)
RBC # BLD: 2.86 M/UL — LOW (ref 4.2–5.8)
RBC # FLD: 15.2 % — HIGH (ref 10.3–14.5)
SODIUM SERPL-SCNC: 145 MMOL/L — SIGNIFICANT CHANGE UP (ref 135–145)
WBC # BLD: 9.1 K/UL — SIGNIFICANT CHANGE UP (ref 3.8–10.5)
WBC # FLD AUTO: 9.1 K/UL — SIGNIFICANT CHANGE UP (ref 3.8–10.5)

## 2025-05-12 PROCEDURE — 99232 SBSQ HOSP IP/OBS MODERATE 35: CPT | Mod: GC

## 2025-05-12 PROCEDURE — G0545: CPT

## 2025-05-12 PROCEDURE — 99232 SBSQ HOSP IP/OBS MODERATE 35: CPT

## 2025-05-12 RX ORDER — SIMETHICONE 80 MG
80 TABLET,CHEWABLE ORAL THREE TIMES A DAY
Refills: 0 | Status: DISCONTINUED | OUTPATIENT
Start: 2025-05-12 | End: 2025-05-14

## 2025-05-12 RX ORDER — TAMSULOSIN HYDROCHLORIDE 0.4 MG/1
0.4 CAPSULE ORAL ONCE
Refills: 0 | Status: COMPLETED | OUTPATIENT
Start: 2025-05-12 | End: 2025-05-12

## 2025-05-12 RX ORDER — MELATONIN 5 MG
3 TABLET ORAL AT BEDTIME
Refills: 0 | Status: DISCONTINUED | OUTPATIENT
Start: 2025-05-12 | End: 2025-05-14

## 2025-05-12 RX ORDER — SODIUM BICARBONATE 1 MEQ/ML
325 SYRINGE (ML) INTRAVENOUS EVERY 12 HOURS
Refills: 0 | Status: DISCONTINUED | OUTPATIENT
Start: 2025-05-12 | End: 2025-05-14

## 2025-05-12 RX ADMIN — Medication 1300 MILLIGRAM(S): at 05:28

## 2025-05-12 RX ADMIN — Medication 650 MILLIGRAM(S): at 20:44

## 2025-05-12 RX ADMIN — Medication 2 MILLIGRAM(S): at 16:28

## 2025-05-12 RX ADMIN — Medication 25 MILLIGRAM(S): at 05:28

## 2025-05-12 RX ADMIN — Medication 50 MILLILITER(S): at 12:26

## 2025-05-12 RX ADMIN — POLYETHYLENE GLYCOL 3350 17 GRAM(S): 17 POWDER, FOR SOLUTION ORAL at 17:52

## 2025-05-12 RX ADMIN — CEFTRIAXONE 100 MILLIGRAM(S): 500 INJECTION, POWDER, FOR SOLUTION INTRAMUSCULAR; INTRAVENOUS at 05:27

## 2025-05-12 RX ADMIN — Medication 25 MILLIGRAM(S): at 22:27

## 2025-05-12 RX ADMIN — TAMSULOSIN HYDROCHLORIDE 0.4 MILLIGRAM(S): 0.4 CAPSULE ORAL at 12:26

## 2025-05-12 RX ADMIN — METOPROLOL SUCCINATE 100 MILLIGRAM(S): 50 TABLET, EXTENDED RELEASE ORAL at 05:28

## 2025-05-12 RX ADMIN — Medication 325 MILLIGRAM(S): at 17:52

## 2025-05-12 RX ADMIN — Medication 650 MILLIGRAM(S): at 21:14

## 2025-05-12 RX ADMIN — POLYETHYLENE GLYCOL 3350 17 GRAM(S): 17 POWDER, FOR SOLUTION ORAL at 05:27

## 2025-05-12 RX ADMIN — Medication 1 TABLET(S): at 11:37

## 2025-05-12 RX ADMIN — Medication 3 MILLIGRAM(S): at 22:29

## 2025-05-12 RX ADMIN — Medication 325 MILLIGRAM(S): at 11:37

## 2025-05-12 RX ADMIN — Medication 25 MILLIGRAM(S): at 16:26

## 2025-05-12 RX ADMIN — Medication 650 MILLIGRAM(S): at 06:16

## 2025-05-12 RX ADMIN — APIXABAN 5 MILLIGRAM(S): 2.5 TABLET, FILM COATED ORAL at 17:49

## 2025-05-12 RX ADMIN — Medication 10 MILLIGRAM(S): at 18:30

## 2025-05-12 RX ADMIN — Medication 2 MILLIGRAM(S): at 22:32

## 2025-05-12 RX ADMIN — APIXABAN 5 MILLIGRAM(S): 2.5 TABLET, FILM COATED ORAL at 05:28

## 2025-05-12 RX ADMIN — Medication 2 MILLIGRAM(S): at 23:02

## 2025-05-12 RX ADMIN — Medication 650 MILLIGRAM(S): at 05:28

## 2025-05-12 RX ADMIN — TAMSULOSIN HYDROCHLORIDE 0.4 MILLIGRAM(S): 0.4 CAPSULE ORAL at 22:27

## 2025-05-12 NOTE — PROGRESS NOTE ADULT - SUBJECTIVE AND OBJECTIVE BOX
Patient is a 64y old  Male who presents with a chief complaint of DEEDEE on CKD, normocytic anemia (11 May 2025 11:46)      INTERVAL HX:  No acute overnight events. Pt seen and examined at bedside. ROS otherwise negative   Allergies:  penicillin (Unknown)  clindamycin (Rash)    Medications:  acetaminophen     Tablet .. 650 milliGRAM(s) Oral every 6 hours PRN  apixaban 5 milliGRAM(s) Oral two times a day  ferrous    sulfate 325 milliGRAM(s) Oral daily  hydrALAZINE 25 milliGRAM(s) Oral every 8 hours  HYDROmorphone   Tablet 2 milliGRAM(s) Oral every 6 hours PRN  lactulose Syrup 10 Gram(s) Oral once PRN  lidocaine   4% Patch 1 Patch Transdermal daily PRN  methocarbamol 750 milliGRAM(s) Oral every 12 hours PRN  metoprolol succinate  milliGRAM(s) Oral daily  Nephro-shannon 1 Tablet(s) Oral daily  polyethylene glycol 3350 17 Gram(s) Oral two times a day  senna 2 Tablet(s) Oral at bedtime  sodium bicarbonate 1300 milliGRAM(s) Oral three times a day  sodium chloride 0.9%. 1000 milliLiter(s) IV Continuous <Continuous>  tamsulosin 0.4 milliGRAM(s) Oral at bedtime    Vitals:  T(C): 37.1 (05-12-25 @ 04:05), Max: 37.4 (05-11-25 @ 20:30)  HR: 100 (05-12-25 @ 04:05) (100 - 108)  BP: 142/89 (05-12-25 @ 04:05) (134/80 - 146/94)  RR: 18 (05-12-25 @ 04:05) (18 - 18)  SpO2: 97% (05-12-25 @ 04:05) (95% - 97%)  I/O's:    05-11-25 @ 07:01  -  05-12-25 @ 07:00  --------------------------------------------------------  IN: 360 mL / OUT: 1600 mL / NET: -1240 mL      Physical Exam:    Labs:                        8.2    9.10  )-----------( 194      ( 12 May 2025 06:34 )             27.4     05-12    145  |  107  |  98[H]  ----------------------------<  102[H]  4.1   |  20[L]  |  4.29[H]    Ca    8.5      12 May 2025 06:34  Phos  4.2     05-12  Mg     2.0     05-12    TPro  5.7[L]  /  Alb  2.5[L]  /  TBili  0.5  /  DBili  x   /  AST  29  /  ALT  27  /  AlkPhos  138[H]  05-12        Radiology/Procedures: Reviewed.   Patient is a 64y old  Male who presents with a chief complaint of DEEDEE on CKD, normocytic anemia (11 May 2025 11:46)      INTERVAL HX:  No acute overnight events. Pt seen and examined at bedside. Alert and oriented to person, place, and year. States his knee is bothering him and is eager for discharge back to rehab. Denies CP, SOB, dysuria, nausea/vomiting, fever/ chills.    Allergies:  penicillin (Unknown)  clindamycin (Rash)    Medications:  acetaminophen     Tablet .. 650 milliGRAM(s) Oral every 6 hours PRN  apixaban 5 milliGRAM(s) Oral two times a day  ferrous    sulfate 325 milliGRAM(s) Oral daily  hydrALAZINE 25 milliGRAM(s) Oral every 8 hours  HYDROmorphone   Tablet 2 milliGRAM(s) Oral every 6 hours PRN  lactulose Syrup 10 Gram(s) Oral once PRN  lidocaine   4% Patch 1 Patch Transdermal daily PRN  methocarbamol 750 milliGRAM(s) Oral every 12 hours PRN  metoprolol succinate  milliGRAM(s) Oral daily  Nephro-shannon 1 Tablet(s) Oral daily  polyethylene glycol 3350 17 Gram(s) Oral two times a day  senna 2 Tablet(s) Oral at bedtime  sodium bicarbonate 1300 milliGRAM(s) Oral three times a day  sodium chloride 0.9%. 1000 milliLiter(s) IV Continuous <Continuous>  tamsulosin 0.4 milliGRAM(s) Oral at bedtime    Vitals:  T(C): 37.1 (05-12-25 @ 04:05), Max: 37.4 (05-11-25 @ 20:30)  HR: 100 (05-12-25 @ 04:05) (100 - 108)  BP: 142/89 (05-12-25 @ 04:05) (134/80 - 146/94)  RR: 18 (05-12-25 @ 04:05) (18 - 18)  SpO2: 97% (05-12-25 @ 04:05) (95% - 97%)  I/O's:    05-11-25 @ 07:01  -  05-12-25 @ 07:00  --------------------------------------------------------  IN: 360 mL / OUT: 1600 mL / NET: -1240 mL      Physical Exam:  GENERAL: NAD, resting in bed  HEAD: normocephalic, atraumatic  CARDIAC: regular rate and rhythm, normal S1S2  PULM: normal breath sounds, clear to ascultation bilaterally, no rales, rhonchi, wheezing  GI: Abd soft, nondistended, nontender, no rebound tenderness, no guarding, no rigidity  : +Woods draining yellow urine  NEURO: no focal motor or sensory deficits, AAOx3  MSK - trace edema, L knee tender to palpation, no erythema or fluctuance       Labs:                        8.2    9.10  )-----------( 194      ( 12 May 2025 06:34 )             27.4     05-12    145  |  107  |  98[H]  ----------------------------<  102[H]  4.1   |  20[L]  |  4.29[H]    Ca    8.5      12 May 2025 06:34  Phos  4.2     05-12  Mg     2.0     05-12    TPro  5.7[L]  /  Alb  2.5[L]  /  TBili  0.5  /  DBili  x   /  AST  29  /  ALT  27  /  AlkPhos  138[H]  05-12        Radiology/Procedures: Reviewed.

## 2025-05-12 NOTE — PROGRESS NOTE ADULT - PROBLEM SELECTOR PLAN 8
> c/w home hydralazine 25mg TID  > c/w home metoprolol succinate 100mg PO QD > c/w home hydralazine 25mg TID  > c/w home metoprolol succinate 100mg PO QD.

## 2025-05-12 NOTE — PROGRESS NOTE ADULT - ASSESSMENT
63 YO M w/ PMH of DM2 (on Januvia), HTN, HLD, gout, A-fib (on Eliquis), CKD not on dialysis (last documented Cr 2.82 on 4/5/25), severe OA, prostate cancer (s/p radiation), recent admission for fall discharged to [facility] rehab, admitted for elevated sCr s/p 2.7L removal and Woods placement - suspected post-renal DEEDEE, course c/b UTI on Aztreonam, febrile ON 5/9 - workup in progress 63 YO M w/ PMH of DM2 (on Januvia), HTN, HLD, gout, A-fib (on Eliquis), CKD not on dialysis (last documented Cr 2.82 on 4/5/25), severe OA, prostate cancer (s/p radiation), recent admission for fall discharged to [facility] rehab, admitted for elevated sCr s/p 2.7L removal and Woods placement - suspected post-renal DEEDEE, course c/b UTI s/p 7d antibiotic course (Aztreonam, CTX)

## 2025-05-12 NOTE — PROGRESS NOTE ADULT - PROBLEM SELECTOR PLAN 11
-DVT Ppx: c/w Eliquis  -Diet: CC/nephro diet  -Fall Precautions  -Dispo: eventual [facility]  -Wound Care d/c recs: "Pt will need Group 2 mattress on hospital bed and ROHO cushion for wheel chair upon discharge home"  -PT eval: likely back to [facility] -DVT Ppx: c/w Eliquis.  -Diet: CC/nephro diet  -Fall Precautions  -Dispo: eventual [facility]  -Wound Care d/c recs: "Pt will need Group 2 mattress on hospital bed and ROHO cushion for wheel chair upon discharge home"  -PT eval: likely back to [facility]

## 2025-05-12 NOTE — PROGRESS NOTE ADULT - SUBJECTIVE AND OBJECTIVE BOX
Overnight events noted      VITAL:  T(C): , Max: 37.4 (05-11-25 @ 20:30)  T(F): , Max: 99.4 (05-11-25 @ 20:30)  HR: 100 (05-12-25 @ 04:05)  BP: 142/89 (05-12-25 @ 04:05)  BP(mean): --  RR: 18 (05-12-25 @ 04:05)  SpO2: 97% (05-12-25 @ 04:05)  Wt(kg): --      PHYSICAL EXAM:  Constitutional: NAD, Alert  HEENT: NCAT, MMM  Neck: Supple, No JVD  Respiratory: CTA-b/l  Cardiovascular: tachy reg s1s2  Gastrointestinal: BS+, soft, NT/ND  : (+)maldonado  Extremities: No peripheral edema b/l  Neurological: reduced generalized strength  Back: no CVAT b/l  Skin: No rashes, no nevi      LABS:                        8.2    9.10  )-----------( 194      ( 12 May 2025 06:34 )             27.4     Na(145)/K(4.1)/Cl(107)/HCO3(20)/BUN(98)/Cr(4.29)Glu(102)/Ca(8.5)/Mg(2.0)/PO4(4.2)    05-12 @ 06:34  Na(147)/K(4.2)/Cl(108)/HCO3(19)/BUN(102)/Cr(4.32)Glu(89)/Ca(8.4)/Mg(1.9)/PO4(3.8)    05-11 @ 08:11  Na(143)/K(3.9)/Cl(107)/HCO3(21)/BUN(106)/Cr(4.48)Glu(107)/Ca(8.5)/Mg(2.0)/PO4(3.9)    05-10 @ 07:18      IMPRESSION: 64M w/ HTN, gout, AFib, and CKD4-5, 5/1/25 p/w DEEDEE on CKD  (1)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  (2)DEEDEE - obstructive - improving s/p maldonado placement  (3)Metabolic acidosis - renally mediated -acceptable, on standing PO NaHCO3  (4)Hypernatremia - cutting back on the PO NaHCO3 could help here  (5)CV - acceptable BP/volume  (6)Anemia - s/p PRBCs/Retacrit; on standing PO FeSO4; improved relative to admission  (7)ID - UTI - s/p IV Azactam  (8) - reasonable to attempt TOV at this point      RECOMMEND:  (1)Reduce PO NaHCO3 from 1300tid to 300bid  (2)Encourage increased free water intake by mouth  (3)No objection to TOV  (4)No objection to discharge; if he returns to [facility], I will look to visit him within the next 1-2 weeks there        Hilton May MD  [facility]  Office/on call physician: [phone]  Cell (7a-7p): [phone]       no pain, no sob      VITAL:  T(C): , Max: 37.4 (05-11-25 @ 20:30)  T(F): , Max: 99.4 (05-11-25 @ 20:30)  HR: 100 (05-12-25 @ 04:05)  BP: 142/89 (05-12-25 @ 04:05)  BP(mean): --  RR: 18 (05-12-25 @ 04:05)  SpO2: 97% (05-12-25 @ 04:05)  Wt(kg): --      PHYSICAL EXAM:  Constitutional: NAD, Alert  HEENT: NCAT, MMM  Neck: Supple, No JVD  Respiratory: CTA-b/l  Cardiovascular: tachy reg s1s2  Gastrointestinal: BS+, soft, NT/ND  : (+)maldonado  Extremities: No peripheral edema b/l  Neurological: reduced generalized strength  Back: no CVAT b/l  Skin: No rashes, no nevi      LABS:                        8.2    9.10  )-----------( 194      ( 12 May 2025 06:34 )             27.4     Na(145)/K(4.1)/Cl(107)/HCO3(20)/BUN(98)/Cr(4.29)Glu(102)/Ca(8.5)/Mg(2.0)/PO4(4.2)    05-12 @ 06:34  Na(147)/K(4.2)/Cl(108)/HCO3(19)/BUN(102)/Cr(4.32)Glu(89)/Ca(8.4)/Mg(1.9)/PO4(3.8)    05-11 @ 08:11  Na(143)/K(3.9)/Cl(107)/HCO3(21)/BUN(106)/Cr(4.48)Glu(107)/Ca(8.5)/Mg(2.0)/PO4(3.9)    05-10 @ 07:18      IMPRESSION: 64M w/ HTN, gout, AFib, and CKD4-5, 5/1/25 p/w DEEDEE on CKD  (1)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  (2)DEEDEE - obstructive - improving s/p maldonado placement  (3)Metabolic acidosis - renally mediated -acceptable, on standing PO NaHCO3  (4)Hypernatremia - cutting back on the PO NaHCO3 could help here  (5)CV - acceptable BP/volume  (6)Anemia - s/p PRBCs/Retacrit; on standing PO FeSO4; improved relative to admission  (7)ID - UTI - s/p IV Azactam  (8) - reasonable to attempt TOV at this point      RECOMMEND:  (1)Reduce PO NaHCO3 from 1300tid to 300bid  (2)Encourage increased free water intake by mouth  (3)No objection to TOV  (4)No objection to discharge; if he returns to [facility], I will look to visit him within the next 1-2 weeks there        Hilton May MD  [facility]  Office/on call physician: [phone]  Cell (7a-7p): [phone]

## 2025-05-12 NOTE — CHART NOTE - NSCHARTNOTEFT_GEN_A_CORE
NUTRITION FOLLOW UP NOTE    PATIENT SEEN FOR: Follow Up     SOURCE: [x] Patient  [x] Current Medical Record  [] RN  [] Family/support person at bedside  [] Patient unavailable/inappropriate  [] Other:    CHART REVIEWED/EVENTS NOTED.  [] No changes to nutrition care plan to note  [x] Nutrition Status:   - Behavioral health consult noted on 5/9 (per chart).     DIET ORDER:   Diet, Consistent Carbohydrate/No Snacks:   Low Sodium  No Concentrated Phosphorus  Prosource Gelatein Plus     Qty per Day:  1 (05-03-25)    CURRENT DIET ORDER IS:  [x] Appropriate: See recommendations below for further details.   [] Inadequate:  [] Other:    NUTRITION INTAKE/PROVISION:  [x] PO: Pt reported variable appetite/po intake in-house, stated he "eats when he feels like it." Variable oral intake noted in nursing flow sheets, 0-25% to % noted. Pt stated he is consuming the ProSource Gelatin Plus 1x/day in-house, amenable to continuing it. Noted to dislike "protein milk shakes" in previous RD note. RD obtained food preferences to optimize PO intake, will honor as able. Pt with menu at bedside, RD encouraged utilizing menu to order and place food preferences to optimize intake. Pt receptive.   [] Enteral Nutrition:  [] Parenteral Nutrition:    ANTHROPOMETRICS:  Drug Dosing Weight  Height (cm): 188 (01 May 2025 20:47)  Weight (kg): 120.2 (01 May 2025 20:47)  BMI (kg/m2): 34 (01 May 2025 20:47)    Weights:  - Dosing Weight (per chart): 264.9 pounds (5/1)  - Daily Weights (per flow sheets): 274.2 pounds (5/9), 266.9 pounds (5/7)  - Bed Scale Weight (taken by RD): 273.7 pounds (5/12)  Weight fluctuations noted in-house, possibly in setting of bed scale discrepancies vs fluid shifts. RD to continue to monitor weights and trends as able.     MEDICATIONS:  MEDICATIONS  (STANDING):  apixaban 5 milliGRAM(s) Oral two times a day  ferrous    sulfate 325 milliGRAM(s) Oral daily  hydrALAZINE 25 milliGRAM(s) Oral every 8 hours  metoprolol succinate  milliGRAM(s) Oral daily  Nephro-shannon 1 Tablet(s) Oral daily  polyethylene glycol 3350 17 Gram(s) Oral two times a day  senna 2 Tablet(s) Oral at bedtime  sodium bicarbonate 1300 milliGRAM(s) Oral three times a day  sodium chloride 0.9%. 1000 milliLiter(s) (50 mL/Hr) IV Continuous <Continuous>  tamsulosin 0.4 milliGRAM(s) Oral at bedtime    MEDICATIONS  (PRN):  acetaminophen     Tablet .. 650 milliGRAM(s) Oral every 6 hours PRN Temp greater or equal to 38C (100.4F), Mild Pain (1 - 3)  HYDROmorphone   Tablet 2 milliGRAM(s) Oral every 6 hours PRN Turning in bed/cleaning and/or SEVERE pain  lactulose Syrup 10 Gram(s) Oral once PRN constipation  lidocaine   4% Patch 1 Patch Transdermal daily PRN L knee pain  methocarbamol 750 milliGRAM(s) Oral every 12 hours PRN Muscle Spasm  simethicone 80 milliGRAM(s) Chew three times a day PRN Gas      NUTRITIONALLY PERTINENT LABS:  05-12 Na145 mmol/L Glu 102 mg/dL[H] K+ 4.1 mmol/L Cr  4.29 mg/dL[H] BUN 98 mg/dL[H] 05-12 Phos 4.2 mg/dL 05-12 Alb 2.5 g/dL[L]05-12 ALT 27 U/L AST 29 U/L Alkaline Phosphatase 138 U/L[H]    A1C with Estimated Average Glucose Result: 4.6 % (04-03-25 @ 07:38)    Finger Sticks:    NUTRITIONALLY PERTINENT MEDICATIONS/LABS:  [x] Reviewed  [x] Relevant notes on medications/labs:  - Pt noted with abnormal renal function, per chart "Likely DEEDEE on CKD." Nephrology following. Sevelamer discontinued 5/9. Phosphorous previously elevated in-house, now within normal limits since 5/9. Potassium within normal limits.  - Pt noted with anemia, s/p 2u pRBC in-house (per orders). Ordered for ferrous sulfate (per orders).   - Pt noted with Type 2 Diabetes, glucose 102 mg/dL (5/12). Fingersticks discontinued 5/10 (per orders).   - Ordered for IV Sodium Chloride 0.9% and Nephro-Shannon (per orders).     EDEMA:  [x] Reviewed  [x] Relevant notes: Pt with 1+ generalized edema (per flow sheets).     GI/ I&O:  [x] Reviewed  [x] Relevant notes: Pt reported slight constipation in-house, stated last BM was 1-2 days ago. RD encouraged adequate fiber and water intake.   [x] Other: Ordered for Lactulose, Miralax and Senna (per orders). Ordered for Mylicon (per orders).   Continue to monitor bowel movements and bowel movement regularity. Adjust bowel regimen as needed.     SKIN:   [] No pressure injuries documented, per nursing flowsheet  [x] Pressure injury previously noted  [] Change in pressure injury documentation:  [x] Other:  - Per Wound Care Note on 5/2: "Sacral region BL buttocks and BL ischial DTPI's  BLE xerosis"     ESTIMATED NEEDS:  [x] No change:  [] Updated:  Energy: 7848-6442 kcal/day (27-32 kcal/kg)  Protein:  g/day (1.1-1.3 g/kg)  Fluid:   ml/day or [x] defer to team  Based on: IBW of 86kg in consideration of BMI >30, increased nutrient needs, CKD.    NUTRITION DIAGNOSIS:  [x] Prior Dx:  1) Increased nutrient needs  [x] New Dx:  2) Inadequate protein-energy intake related to inability to meet sufficient protein-energy needs in setting of variable appetite, reduced mobility, as evidenced by pt with variable po intake in-house.   --> Goal: Pt will be able to consistently meet >75% of estimated nutrient needs.     EDUCATION:  [x] Yes: Educated on the importance of meeting adequate protein-energy needs. Encouraged consumption of protein-rich foods (within therapeutic recommendation) and prioritizing protein foods first at meal times for wound healing. Encouraged consumption of oral nutrition supplement to optimize protein-energy intake. Encouraged small, frequent meals. Emphasized importance of consuming nutrient dense foods and snacks to optimize energy and protein intake. Obtained food preferences, will honor as able. Encouraged use of in-house menu. Pt aware RD remains available.   [] Not appropriate/warranted    NUTRITION CARE PLAN:  - Recommend continue Consistent Carbohydrate, Low Sodium, No Concentrated Phosphorous diet as tolerated. Monitor need for ongoing Phosphorous restriction. RD remains available to adjust diet as needed. Defer diet textures/consistencies to medical team.   - Recommend continue Nephro-Shannon and Ferrous Sulfate daily for micronutrient coverage unless contraindicated.  - Recommend continue ProSource Gelatin Plus 1x/day to optimize protein-energy intake and assist with wound healing.   - Monitor and encourage adequate po intake. RD to add in food preferences to optimize intake.   - Monitor electrolytes daily and replete as needed. Monitor glucose levels and renal indices.   - Monitor nutritional intake, tolerance to diet prescription, bowel movements, weights, labs, and skin integrity.     [] Achieved - Continue current nutrition intervention(s)  [] Current medical condition precludes nutrition intervention at this time.    MONITORING AND EVALUATION:   RD remains available upon request and will follow up per protocol.    Tabatha Walden, ROBERTON, CDN  TEAMS

## 2025-05-12 NOTE — PROGRESS NOTE ADULT - PROBLEM SELECTOR PLAN 1
- Per nephro note in prior hospitalization: unclear if DEEDEE on CKD vs slowly progressive CKD.   - 5/1/25 Cr 5.85 at admission. (In prior hospitalization, Cr 3.9 on admission. Per Dr. Woo, baseline Cr is 2.5)  - Nephrology consulted - Dr. Hilton May (Nephrologist at [facility])  - 4/25/25 Renal US: multiple bilateral renal cysts, which are increased in size and number compared to the prior CT scan - c/f neoplasm  - Urine Lytes (05/02): FeNa 4.2% suggestive of intrinsic disease  - s/p maldonado catheter placement in ER with 2.7L fluid removed  - Ddx: Likely DEEDEE on CKD 2/2 post-renal DEEDEE s/p 2.7L fluid removed on maldonado catheter; now possibly also with component of pre-renal DEEDEE due to large volume UOP after maldonado placement --> overall DEEDEE improving    Plan:  > c/w indwelling maldonado (05/01 - present) - will consider TOV  > daily BMP/Mg/Phos  > c/w bicarb 1300mg PO TID (up-titrated 05/05)  > s/p Sevelamer, discontinued 5/9  > c/w strict intake/output Q6H  > appreciate Nephrology recommendations - Per nephro note in prior hospitalization: unclear if DEEDEE on CKD vs slowly progressive CKD.   - 5/1/25 Cr 5.85 at admission. (In prior hospitalization, Cr 3.9 on admission. Per Dr. Woo, baseline Cr is 2.5)  - Nephrology consulted - Dr. Hilton May (Nephrologist at [facility])  - 4/25/25 Renal US: multiple bilateral renal cysts, which are increased in size and number compared to the prior CT scan - c/f neoplasm  - Urine Lytes (05/02): FeNa 4.2% suggestive of intrinsic disease  - s/p maldonado catheter placement in ER with 2.7L fluid removed  - Ddx: Likely DEEDEE on CKD 2/2 post-renal DEEDEE s/p 2.7L fluid removed on maldonado catheter; now possibly also with component of pre-renal DEEDEE due to large volume UOP after maldonado placement --> overall DEEDEE improving    Plan:  > c/w indwelling maldonado (05/01 - present) - TOV today after additional 0.4mg flomax  > daily BMP/Mg/Phos  > today decrease bicarb to 325mg BID per nephro  > s/p Sevelamer, discontinued 5/9  > c/w strict intake/output Q6H  > appreciate Nephrology recommendations

## 2025-05-12 NOTE — PROGRESS NOTE ADULT - PROBLEM SELECTOR PLAN 2
Febrile overnight 5/4, TMax 101.2. No complaint of dysuria but cloudy urine noted in Woods bag, UA grossly (+).    - S/p Vancomycin 750mg 5/5  - C/w Aztreonam, initially planned for 5d course (5/6-5/10) (penicillin allergy); urine culture with pan-sensitive E. coli, BCX 5/5 NGTD  - Fever ON 5/9 -- repeat UA obtained, culture ordered, ID consulted for further antibiotic recs given presence of Woods catheter    Plan:  - follow up infectious work up Febrile overnight 5/4, TMax 101.2. No complaint of dysuria but cloudy urine noted in Woods bag, UA grossly (+).    - S/p Vancomycin 750mg 5/5  - Urine culture with pan-sensitive E. coli, BCX 5/5 NGTD  - Fever ON 5/9 - repeat UCX no growth  - Now s/p 5d Aztreonam, 2 CTX course per ID recs  - Trend WBC, fever curve

## 2025-05-12 NOTE — PROGRESS NOTE ADULT - SUBJECTIVE AND OBJECTIVE BOX
64yPatient is a 64y old  Male who presents with a chief complaint of DEEDEE on CKD, normocytic anemia (12 May 2025 08:00)      Interval history:  Afebrile, wants to get OOB.       Allergies:   penicillin (Unknown)  clindamycin (Rash)    Antimicrobials:      REVIEW OF SYSTEMS:  No chest pain   No SOB  No abdominal pain      Vital Signs Last 24 Hrs  T(C): 36.7 (05-12-25 @ 11:34), Max: 37.4 (05-11-25 @ 20:30)  T(F): 98 (05-12-25 @ 11:34), Max: 99.4 (05-11-25 @ 20:30)  HR: 81 (05-12-25 @ 16:26) (81 - 120)  BP: 141/94 (05-12-25 @ 16:26) (134/80 - 153/95)  BP(mean): --  RR: 18 (05-12-25 @ 11:34) (18 - 18)  SpO2: 99% (05-12-25 @ 11:34) (95% - 99%)      PHYSICAL EXAM:  Pt in no acute distress, alert, awake.   breathing comfortably   non distended abdomen  + maldonado  no phlebitis                             8.2    9.10  )-----------( 194      ( 12 May 2025 06:34 )             27.4   05-12    145  |  107  |  98[H]  ----------------------------<  102[H]  4.1   |  20[L]  |  4.29[H]    Ca    8.5      12 May 2025 06:34  Phos  4.2     05-12  Mg     2.0     05-12    TPro  5.7[L]  /  Alb  2.5[L]  /  TBili  0.5  /  DBili  x   /  AST  29  /  ALT  27  /  AlkPhos  138[H]  05-12      LIVER FUNCTIONS - ( 12 May 2025 06:34 )  Alb: 2.5 g/dL / Pro: 5.7 g/dL / ALK PHOS: 138 U/L / ALT: 27 U/L / AST: 29 U/L / GGT: x               Culture - Blood (collected 10 May 2025 18:02)  Source: Blood Blood-Peripheral  Preliminary Report (12 May 2025 02:02):    No growth at 24 hours    Culture - Urine (collected 10 May 2025 12:42)  Source: Catheterized Catheterized  Final Report (11 May 2025 18:53):    No growth    Culture - Blood (collected 10 May 2025 09:56)  Source: Blood Blood-Peripheral  Preliminary Report (12 May 2025 14:01):    No growth at 48 Hours

## 2025-05-13 LAB
ALBUMIN SERPL ELPH-MCNC: 2.6 G/DL — LOW (ref 3.3–5)
ALP SERPL-CCNC: 118 U/L — SIGNIFICANT CHANGE UP (ref 40–120)
ALT FLD-CCNC: 21 U/L — SIGNIFICANT CHANGE UP (ref 10–45)
ANION GAP SERPL CALC-SCNC: 18 MMOL/L — HIGH (ref 5–17)
AST SERPL-CCNC: 20 U/L — SIGNIFICANT CHANGE UP (ref 10–40)
BILIRUB SERPL-MCNC: 0.4 MG/DL — SIGNIFICANT CHANGE UP (ref 0.2–1.2)
BUN SERPL-MCNC: 104 MG/DL — HIGH (ref 7–23)
CALCIUM SERPL-MCNC: 8.6 MG/DL — SIGNIFICANT CHANGE UP (ref 8.4–10.5)
CHLORIDE SERPL-SCNC: 108 MMOL/L — SIGNIFICANT CHANGE UP (ref 96–108)
CO2 SERPL-SCNC: 19 MMOL/L — LOW (ref 22–31)
CREAT SERPL-MCNC: 4.05 MG/DL — HIGH (ref 0.5–1.3)
EGFR: 15 ML/MIN/1.73M2 — LOW
EGFR: 15 ML/MIN/1.73M2 — LOW
GLUCOSE SERPL-MCNC: 103 MG/DL — HIGH (ref 70–99)
HCT VFR BLD CALC: 27.4 % — LOW (ref 39–50)
HGB BLD-MCNC: 8.2 G/DL — LOW (ref 13–17)
MAGNESIUM SERPL-MCNC: 2 MG/DL — SIGNIFICANT CHANGE UP (ref 1.6–2.6)
MCHC RBC-ENTMCNC: 29.1 PG — SIGNIFICANT CHANGE UP (ref 27–34)
MCHC RBC-ENTMCNC: 29.9 G/DL — LOW (ref 32–36)
MCV RBC AUTO: 97.2 FL — SIGNIFICANT CHANGE UP (ref 80–100)
NRBC BLD AUTO-RTO: 0 /100 WBCS — SIGNIFICANT CHANGE UP (ref 0–0)
PHOSPHATE SERPL-MCNC: 4.9 MG/DL — HIGH (ref 2.5–4.5)
PLATELET # BLD AUTO: 193 K/UL — SIGNIFICANT CHANGE UP (ref 150–400)
POTASSIUM SERPL-MCNC: 3.9 MMOL/L — SIGNIFICANT CHANGE UP (ref 3.5–5.3)
POTASSIUM SERPL-SCNC: 3.9 MMOL/L — SIGNIFICANT CHANGE UP (ref 3.5–5.3)
PROT SERPL-MCNC: 5.6 G/DL — LOW (ref 6–8.3)
RBC # BLD: 2.82 M/UL — LOW (ref 4.2–5.8)
RBC # FLD: 15 % — HIGH (ref 10.3–14.5)
SODIUM SERPL-SCNC: 145 MMOL/L — SIGNIFICANT CHANGE UP (ref 135–145)
WBC # BLD: 8.34 K/UL — SIGNIFICANT CHANGE UP (ref 3.8–10.5)
WBC # FLD AUTO: 8.34 K/UL — SIGNIFICANT CHANGE UP (ref 3.8–10.5)

## 2025-05-13 PROCEDURE — 99232 SBSQ HOSP IP/OBS MODERATE 35: CPT | Mod: GC

## 2025-05-13 RX ADMIN — Medication 2 TABLET(S): at 22:02

## 2025-05-13 RX ADMIN — Medication 25 MILLIGRAM(S): at 13:20

## 2025-05-13 RX ADMIN — TAMSULOSIN HYDROCHLORIDE 0.4 MILLIGRAM(S): 0.4 CAPSULE ORAL at 21:59

## 2025-05-13 RX ADMIN — Medication 325 MILLIGRAM(S): at 12:12

## 2025-05-13 RX ADMIN — METOPROLOL SUCCINATE 100 MILLIGRAM(S): 50 TABLET, EXTENDED RELEASE ORAL at 06:14

## 2025-05-13 RX ADMIN — POLYETHYLENE GLYCOL 3350 17 GRAM(S): 17 POWDER, FOR SOLUTION ORAL at 06:21

## 2025-05-13 RX ADMIN — Medication 650 MILLIGRAM(S): at 18:39

## 2025-05-13 RX ADMIN — Medication 25 MILLIGRAM(S): at 22:02

## 2025-05-13 RX ADMIN — Medication 325 MILLIGRAM(S): at 17:11

## 2025-05-13 RX ADMIN — Medication 650 MILLIGRAM(S): at 18:56

## 2025-05-13 RX ADMIN — APIXABAN 5 MILLIGRAM(S): 2.5 TABLET, FILM COATED ORAL at 17:11

## 2025-05-13 RX ADMIN — Medication 25 MILLIGRAM(S): at 06:14

## 2025-05-13 RX ADMIN — Medication 1 TABLET(S): at 12:13

## 2025-05-13 RX ADMIN — Medication 2 MILLIGRAM(S): at 11:05

## 2025-05-13 RX ADMIN — Medication 50 MILLILITER(S): at 12:14

## 2025-05-13 RX ADMIN — METHOCARBAMOL 750 MILLIGRAM(S): 500 TABLET, FILM COATED ORAL at 15:26

## 2025-05-13 RX ADMIN — Medication 2 MILLIGRAM(S): at 10:49

## 2025-05-13 RX ADMIN — APIXABAN 5 MILLIGRAM(S): 2.5 TABLET, FILM COATED ORAL at 06:14

## 2025-05-13 RX ADMIN — Medication 2 MILLIGRAM(S): at 17:11

## 2025-05-13 RX ADMIN — Medication 2 MILLIGRAM(S): at 17:40

## 2025-05-13 RX ADMIN — POLYETHYLENE GLYCOL 3350 17 GRAM(S): 17 POWDER, FOR SOLUTION ORAL at 17:11

## 2025-05-13 RX ADMIN — Medication 325 MILLIGRAM(S): at 06:14

## 2025-05-13 RX ADMIN — Medication 3 MILLIGRAM(S): at 22:02

## 2025-05-13 NOTE — PROGRESS NOTE ADULT - ASSESSMENT
63 YO M w/ PMH of DM2 (on Januvia), HTN, HLD, gout, A-fib (on Eliquis), CKD not on dialysis (last documented Cr 2.82 on 4/5/25), severe OA, prostate cancer (s/p radiation), recent admission for fall discharged to [facility], admitted for elevated sCr s/p 2.7L removal and Woods placement - suspected post-renal DEEDEE, course c/b UTI s/p 7d antibiotic course (Aztreonam, CTX)

## 2025-05-13 NOTE — PROGRESS NOTE ADULT - PROBLEM SELECTOR PLAN 11
-DVT Ppx: c/w Eliquis.  -Diet: CC/nephro diet  -Fall Precautions  -Dispo: eventual [facility]  -Wound Care d/c recs: "Pt will need Group 2 mattress on hospital bed and ROHO cushion for wheel chair upon discharge home"  -PT eval: likely back to [facility]

## 2025-05-13 NOTE — PROGRESS NOTE ADULT - PROBLEM SELECTOR PROBLEM 5
Normocytic anemia Skyrizi Counseling: I discussed with the patient the risks of risankizumab-rzaa including but not limited to immunosuppression, and serious infections.  The patient understands that monitoring is required including a PPD at baseline and must alert us or the primary physician if symptoms of infection or other concerning signs are noted.

## 2025-05-13 NOTE — PROGRESS NOTE ADULT - PROBLEM SELECTOR PLAN 1
- Per nephro note in prior hospitalization: unclear if DEEDEE on CKD vs slowly progressive CKD.   - 5/1/25 Cr 5.85 at admission. (In prior hospitalization, Cr 3.9 on admission. Per Dr. Woo, baseline Cr is 2.5)  - Nephrology consulted - Dr. Hilton May (Nephrologist at [facility])  - 4/25/25 Renal US: multiple bilateral renal cysts, which are increased in size and number compared to the prior CT scan - c/f neoplasm  - Urine Lytes (05/02): FeNa 4.2% suggestive of intrinsic disease  - s/p maldonado catheter placement in ER with 2.7L fluid removed  - Ddx: Likely DEEDEE on CKD 2/2 post-renal DEEDEE s/p 2.7L fluid removed on maldonado catheter; now possibly also with component of pre-renal DEEDEE due to large volume UOP after maldonado placement --> overall DEEDEE improving    Plan:  > c/w indwelling maldonado (05/01 - present) - TOV today after additional 0.4mg flomax  > daily BMP/Mg/Phos  > today decrease bicarb to 325mg BID per nephro  > s/p Sevelamer, discontinued 5/9  > c/w strict intake/output Q6H  > appreciate Nephrology recommendations - Per nephro note in prior hospitalization: unclear if DEEDEE on CKD vs slowly progressive CKD.   - 5/1/25 Cr 5.85 at admission. (In prior hospitalization, Cr 3.9 on admission. Per Dr. Woo, baseline Cr is 2.5)  - Nephrology consulted - Dr. Hilton May (Nephrologist at [facility])  - 4/25/25 Renal US: multiple bilateral renal cysts, which are increased in size and number compared to the prior CT scan - c/f neoplasm  - Urine Lytes (05/02): FeNa 4.2% suggestive of intrinsic disease  - s/p maldonado catheter placement in ER with 2.7L fluid removed  - Ddx: Likely DEEDEE on CKD 2/2 post-renal DEEDEE s/p 2.7L fluid removed on maldonado catheter; now possibly also with component of pre-renal DEEDEE due to large volume UOP after maldonado placement --> overall DEEDEE improving    Plan:  > ongoing TOV - maldonado removed 5/12  > daily BMP/Mg/Phos  > cw bicarb 325mg BID per nephro  > s/p Sevelamer, discontinued 5/9  > c/w strict intake/output Q6H  > appreciate Nephrology recommendations - Per nephro note in prior hospitalization: unclear if DEEDEE on CKD vs slowly progressive CKD.   - 5/1/25 Cr 5.85 at admission. (In prior hospitalization, Cr 3.9 on admission. Per Dr. Woo, baseline Cr is 2.5)  - Nephrology consulted - Dr. Hilton May (Nephrologist at [facility])  - 4/25/25 Renal US: multiple bilateral renal cysts, which are increased in size and number compared to the prior CT scan - c/f neoplasm  - Urine Lytes (05/02): FeNa 4.2% suggestive of intrinsic disease  - s/p maldonado catheter placement in ER with 2.7L fluid removed  - Ddx: Likely DEEDEE on CKD 2/2 post-renal DEEDEE s/p 2.7L fluid removed on maldonado catheter; now possibly also with component of pre-renal DEEDEE due to large volume UOP after maldonado placement --> overall DEEDEE improving    Plan:  > ongoing TOV - maldonado removed 5/12  > additional IVF ordered today (50cc/hr NS x 10h)  > daily BMP/Mg/Phos  > cw bicarb 325mg BID per nephro  > s/p Sevelamer, discontinued 5/9  > c/w strict intake/output Q6H  > appreciate Nephrology recommendations

## 2025-05-13 NOTE — PROGRESS NOTE ADULT - ASSESSMENT
IMPRESSION: 64M w/ HTN, gout, AFib, and CKD4-5, 5/1/25 p/w DEEDEE on CKD  (1)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  (2)DEEDEE - obstructive - improving s/p maldonado placement  (3)Metabolic acidosis - renally mediated -acceptable, on standing PO NaHCO3  (4)Hypernatremia - cutting back on the PO NaHCO3 could help here  (5)CV - acceptable BP/volume  (6)Anemia - s/p PRBCs/Retacrit; on standing PO FeSO4; improved relative to admission  (7)ID - UTI - s/p IV Azactam  (8) - maldonado removed, s/p straight cath x 1 this morning    RECOMMEND:  (1)Continue PO NaHCO3 325mg po bid  (2)Encourage increased free water intake by mouth  (3)Monitor output   (4)No objection to discharge; if he returns to [facility], Dr. May will look to visit him within the next 1-2 weeks there       IMPRESSION: 64M w/ HTN, gout, AFib, and CKD4-5, 5/1/25 p/w DEEDEE on CKD  (1)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  (2)DEEDEE - obstructive - improving s/p maldonado placement  (3)Metabolic acidosis - renally mediated -acceptable, on standing PO NaHCO3  (4)Hypernatremia - cutting back on the PO NaHCO3 could help here  (5)CV - acceptable BP/volume  (6)Anemia - s/p PRBCs/Retacrit; on standing PO FeSO4; improved relative to admission  (7)ID - UTI - s/p IV Azactam  (8) - maldonado removed, s/p straight cath x 1 this morning    RECOMMEND:  (1)Continue PO NaHCO3 325mg po bid  (2)Encourage increased free water intake by mouth  (3)Monitor output   (4)No objection to discharge; if he returns to [facility], Dr. May will look to visit him within the next 1-2 weeks there    Marianela Grigsby DNP  [facility]  [phone]      IMPRESSION: 64M w/ HTN, gout, AFib, and CKD4-5, 5/1/25 p/w DEEDEE on CKD  (1)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  (2)DEEDEE - obstructive - improving s/p maldonado placement  (3)Metabolic acidosis - renally mediated -acceptable, on standing PO NaHCO3  (4)Hypernatremia - cutting back on the PO NaHCO3 could help here  (5)CV - acceptable BP/volume  (6)Anemia - s/p PRBCs/Retacrit; on standing PO FeSO4; improved relative to admission  (7)ID - UTI - s/p IV Azactam  (8) - maldonado removed, s/p straight cath x 1 this morning    RECOMMEND:  (1)Continue PO NaHCO3 325mg po bid  (2)Encourage increased free water intake by mouth  (3)Monitor output   (4)No objection to discharge; if he returns to [facility], Dr. May will look to visit him within the next 1-2 weeks there    Marianela Grigsby DNP  [facility]  [phone]       RENAL ATTENDING NOTE  Patient seen and examined with NP. Agree with assessment and plan as above.  counseled patient and sister at bedside    Hilton May MD  [facility]  [phone]

## 2025-05-13 NOTE — PROGRESS NOTE ADULT - SUBJECTIVE AND OBJECTIVE BOX
NEPHROLOGY     Patient seen and examined resting on room air, no new complaints, in no acute distress.     MEDICATIONS  (STANDING):  apixaban 5 milliGRAM(s) Oral two times a day  ferrous    sulfate 325 milliGRAM(s) Oral daily  hydrALAZINE 25 milliGRAM(s) Oral every 8 hours  melatonin 3 milliGRAM(s) Oral at bedtime  metoprolol succinate  milliGRAM(s) Oral daily  Nephro-shannon 1 Tablet(s) Oral daily  polyethylene glycol 3350 17 Gram(s) Oral two times a day  senna 2 Tablet(s) Oral at bedtime  sodium bicarbonate 325 milliGRAM(s) Oral every 12 hours  sodium chloride 0.9%. 1000 milliLiter(s) (50 mL/Hr) IV Continuous <Continuous>  tamsulosin 0.4 milliGRAM(s) Oral at bedtime    VITALS:  T(C): , Max: 36.9 (05-13-25 @ 06:10)  T(F): , Max: 98.5 (05-13-25 @ 06:10)  HR: 116 (05-13-25 @ 06:10)  BP: 138/101 (05-13-25 @ 06:10)  BP(mean): --  RR: 18 (05-13-25 @ 06:10)  SpO2: 97% (05-13-25 @ 06:10)  Wt(kg): --  I and O's:    05-12 @ 07:01  -  05-13 @ 07:00  --------------------------------------------------------  IN: 270 mL / OUT: 800 mL / NET: -530 mL    PHYSICAL EXAM:  Constitutional: NAD, Alert  HEENT: NCAT, MMM  Neck: Supple, No JVD  Respiratory: CTA-b/l  Cardiovascular: tachy reg s1s2  Gastrointestinal: BS+, soft, NT/ND  : no maldonado  Extremities: No peripheral edema b/l  Neurological: reduced generalized strength  Back: no CVAT b/l  Skin: No rashes, no nevi    LABS:                        8.2    8.34  )-----------( 193      ( 13 May 2025 07:15 )             27.4     05-13    145  |  108  |  104[H]  ----------------------------<  103[H]  3.9   |  19[L]  |  4.05[H]    Ca    8.6      13 May 2025 07:15  Phos  4.9     05-13  Mg     2.0     05-13    TPro  5.6[L]  /  Alb  2.6[L]  /  TBili  0.4  /  DBili  x   /  AST  20  /  ALT  21  /  AlkPhos  118  05-13

## 2025-05-13 NOTE — PROGRESS NOTE ADULT - SUBJECTIVE AND OBJECTIVE BOX
Patient is a 64y old  Male who presents with a chief complaint of DEEDEE on CKD, normocytic anemia (12 May 2025 16:02)      INTERVAL HX:  No acute overnight events. Pt seen and examined at bedside. ROS otherwise negative   Allergies:  penicillin (Unknown)  clindamycin (Rash)    Medications:  acetaminophen     Tablet .. 650 milliGRAM(s) Oral every 6 hours PRN  apixaban 5 milliGRAM(s) Oral two times a day  ferrous    sulfate 325 milliGRAM(s) Oral daily  hydrALAZINE 25 milliGRAM(s) Oral every 8 hours  HYDROmorphone   Tablet 2 milliGRAM(s) Oral every 6 hours PRN  lactulose Syrup 10 Gram(s) Oral once PRN  lidocaine   4% Patch 1 Patch Transdermal daily PRN  melatonin 3 milliGRAM(s) Oral at bedtime  methocarbamol 750 milliGRAM(s) Oral every 12 hours PRN  metoprolol succinate  milliGRAM(s) Oral daily  Nephro-shannon 1 Tablet(s) Oral daily  polyethylene glycol 3350 17 Gram(s) Oral two times a day  senna 2 Tablet(s) Oral at bedtime  simethicone 80 milliGRAM(s) Chew three times a day PRN  sodium bicarbonate 325 milliGRAM(s) Oral every 12 hours  sodium chloride 0.9%. 1000 milliLiter(s) IV Continuous <Continuous>  tamsulosin 0.4 milliGRAM(s) Oral at bedtime    Vitals:  T(C): 36.9 (05-13-25 @ 06:10), Max: 36.9 (05-13-25 @ 06:10)  HR: 116 (05-13-25 @ 06:10) (81 - 120)  BP: 138/101 (05-13-25 @ 06:10) (130/99 - 155/98)  RR: 18 (05-13-25 @ 06:10) (18 - 18)  SpO2: 97% (05-13-25 @ 06:10) (97% - 99%)  I/O's:    05-12-25 @ 07:01  -  05-13-25 @ 07:00  --------------------------------------------------------  IN: 270 mL / OUT: 800 mL / NET: -530 mL      Physical Exam:  GENERAL: NAD, resting in bed  HEAD: normocephalic, atraumatic  CARDIAC: regular rate and rhythm, normal S1S2  PULM: normal breath sounds, clear to ascultation bilaterally, no rales, rhonchi, wheezing  GI: Abd soft, nondistended, nontender, no rebound tenderness, no guarding, no rigidity  : +Woods draining yellow urine  NEURO: no focal motor or sensory deficits, AAOx3  MSK - trace edema, L knee tender to palpation, no erythema or fluctuance     Labs:                        8.2    9.10  )-----------( 194      ( 12 May 2025 06:34 )             27.4     05-12    145  |  107  |  98[H]  ----------------------------<  102[H]  4.1   |  20[L]  |  4.29[H]    Ca    8.5      12 May 2025 06:34  Phos  4.2     05-12  Mg     2.0     05-12    TPro  5.7[L]  /  Alb  2.5[L]  /  TBili  0.5  /  DBili  x   /  AST  29  /  ALT  27  /  AlkPhos  138[H]  05-12        Radiology/Procedures: Reviewed.   Patient is a 64y old  Male who presents with a chief complaint of DEEDEE on CKD, normocytic anemia (12 May 2025 16:02)      INTERVAL HX:  No acute overnight events. TOV ongoing. Pt seen and examined at bedside. Alert and oriented to person, place, and year. Denies CP, SOB, dysuria, nausea/vomiting, fever/ chills.    Allergies:  penicillin (Unknown)  clindamycin (Rash)    Medications:  acetaminophen     Tablet .. 650 milliGRAM(s) Oral every 6 hours PRN  apixaban 5 milliGRAM(s) Oral two times a day  ferrous    sulfate 325 milliGRAM(s) Oral daily  hydrALAZINE 25 milliGRAM(s) Oral every 8 hours  HYDROmorphone   Tablet 2 milliGRAM(s) Oral every 6 hours PRN  lactulose Syrup 10 Gram(s) Oral once PRN  lidocaine   4% Patch 1 Patch Transdermal daily PRN  melatonin 3 milliGRAM(s) Oral at bedtime  methocarbamol 750 milliGRAM(s) Oral every 12 hours PRN  metoprolol succinate  milliGRAM(s) Oral daily  Nephro-shannon 1 Tablet(s) Oral daily  polyethylene glycol 3350 17 Gram(s) Oral two times a day  senna 2 Tablet(s) Oral at bedtime  simethicone 80 milliGRAM(s) Chew three times a day PRN  sodium bicarbonate 325 milliGRAM(s) Oral every 12 hours  sodium chloride 0.9%. 1000 milliLiter(s) IV Continuous <Continuous>  tamsulosin 0.4 milliGRAM(s) Oral at bedtime    Vitals:  T(C): 36.9 (05-13-25 @ 06:10), Max: 36.9 (05-13-25 @ 06:10)  HR: 116 (05-13-25 @ 06:10) (81 - 120)  BP: 138/101 (05-13-25 @ 06:10) (130/99 - 155/98)  RR: 18 (05-13-25 @ 06:10) (18 - 18)  SpO2: 97% (05-13-25 @ 06:10) (97% - 99%)  I/O's:    05-12-25 @ 07:01  -  05-13-25 @ 07:00  --------------------------------------------------------  IN: 270 mL / OUT: 800 mL / NET: -530 mL      Physical Exam:  GENERAL: NAD, resting in bed  HEAD: normocephalic, atraumatic  CARDIAC: regular rate and rhythm, normal S1S2  PULM: normal breath sounds, clear to ascultation bilaterally, no rales, rhonchi, wheezing  GI: Abd soft, nondistended, nontender, no rebound tenderness, no guarding, no rigidity  : +Woods draining yellow urine  NEURO: no focal motor or sensory deficits, AAOx3  MSK - trace edema, L knee tender to palpation, no erythema or fluctuance     Labs:                        8.2    9.10  )-----------( 194      ( 12 May 2025 06:34 )             27.4     05-12    145  |  107  |  98[H]  ----------------------------<  102[H]  4.1   |  20[L]  |  4.29[H]    Ca    8.5      12 May 2025 06:34  Phos  4.2     05-12  Mg     2.0     05-12    TPro  5.7[L]  /  Alb  2.5[L]  /  TBili  0.5  /  DBili  x   /  AST  29  /  ALT  27  /  AlkPhos  138[H]  05-12        Radiology/Procedures: Reviewed.

## 2025-05-13 NOTE — PROGRESS NOTE ADULT - PROBLEM SELECTOR PLAN 2
Febrile overnight 5/4, TMax 101.2. No complaint of dysuria but cloudy urine noted in Woods bag, UA grossly (+).    - S/p Vancomycin 750mg 5/5  - Urine culture with pan-sensitive E. coli, BCX 5/5 NGTD  - Fever ON 5/9 - repeat UCX no growth  - Now s/p 5d Aztreonam, 2 CTX course per ID recs  - Trend WBC, fever curve

## 2025-05-14 ENCOUNTER — TRANSCRIPTION ENCOUNTER (OUTPATIENT)
Age: 65
End: 2025-05-14

## 2025-05-14 VITALS
TEMPERATURE: 99 F | HEART RATE: 117 BPM | SYSTOLIC BLOOD PRESSURE: 137 MMHG | RESPIRATION RATE: 20 BRPM | OXYGEN SATURATION: 98 % | DIASTOLIC BLOOD PRESSURE: 93 MMHG

## 2025-05-14 LAB
ALBUMIN SERPL ELPH-MCNC: 2.5 G/DL — LOW (ref 3.3–5)
ALP SERPL-CCNC: 102 U/L — SIGNIFICANT CHANGE UP (ref 40–120)
ALT FLD-CCNC: 18 U/L — SIGNIFICANT CHANGE UP (ref 10–45)
ANION GAP SERPL CALC-SCNC: 18 MMOL/L — HIGH (ref 5–17)
AST SERPL-CCNC: 14 U/L — SIGNIFICANT CHANGE UP (ref 10–40)
BILIRUB SERPL-MCNC: 0.4 MG/DL — SIGNIFICANT CHANGE UP (ref 0.2–1.2)
BLD GP AB SCN SERPL QL: NEGATIVE — SIGNIFICANT CHANGE UP
BUN SERPL-MCNC: 100 MG/DL — HIGH (ref 7–23)
CALCIUM SERPL-MCNC: 8.4 MG/DL — SIGNIFICANT CHANGE UP (ref 8.4–10.5)
CHLORIDE SERPL-SCNC: 109 MMOL/L — HIGH (ref 96–108)
CO2 SERPL-SCNC: 18 MMOL/L — LOW (ref 22–31)
CREAT SERPL-MCNC: 4.09 MG/DL — HIGH (ref 0.5–1.3)
EGFR: 15 ML/MIN/1.73M2 — LOW
EGFR: 15 ML/MIN/1.73M2 — LOW
GLUCOSE SERPL-MCNC: 104 MG/DL — HIGH (ref 70–99)
HCT VFR BLD CALC: 26.8 % — LOW (ref 39–50)
HGB BLD-MCNC: 8.2 G/DL — LOW (ref 13–17)
MAGNESIUM SERPL-MCNC: 2 MG/DL — SIGNIFICANT CHANGE UP (ref 1.6–2.6)
MCHC RBC-ENTMCNC: 29.4 PG — SIGNIFICANT CHANGE UP (ref 27–34)
MCHC RBC-ENTMCNC: 30.6 G/DL — LOW (ref 32–36)
MCV RBC AUTO: 96.1 FL — SIGNIFICANT CHANGE UP (ref 80–100)
NRBC BLD AUTO-RTO: 0 /100 WBCS — SIGNIFICANT CHANGE UP (ref 0–0)
PHOSPHATE SERPL-MCNC: 4.7 MG/DL — HIGH (ref 2.5–4.5)
PLATELET # BLD AUTO: 186 K/UL — SIGNIFICANT CHANGE UP (ref 150–400)
POTASSIUM SERPL-MCNC: 3.8 MMOL/L — SIGNIFICANT CHANGE UP (ref 3.5–5.3)
POTASSIUM SERPL-SCNC: 3.8 MMOL/L — SIGNIFICANT CHANGE UP (ref 3.5–5.3)
PROT SERPL-MCNC: 5.5 G/DL — LOW (ref 6–8.3)
RBC # BLD: 2.79 M/UL — LOW (ref 4.2–5.8)
RBC # FLD: 14.8 % — HIGH (ref 10.3–14.5)
RH IG SCN BLD-IMP: POSITIVE — SIGNIFICANT CHANGE UP
SODIUM SERPL-SCNC: 145 MMOL/L — SIGNIFICANT CHANGE UP (ref 135–145)
WBC # BLD: 8.88 K/UL — SIGNIFICANT CHANGE UP (ref 3.8–10.5)
WBC # FLD AUTO: 8.88 K/UL — SIGNIFICANT CHANGE UP (ref 3.8–10.5)

## 2025-05-14 PROCEDURE — 99239 HOSP IP/OBS DSCHRG MGMT >30: CPT

## 2025-05-14 RX ORDER — GABAPENTIN 400 MG/1
1 CAPSULE ORAL
Qty: 0 | Refills: 0 | DISCHARGE
Start: 2025-05-14

## 2025-05-14 RX ORDER — POLYETHYLENE GLYCOL 3350 17 G/17G
17 POWDER, FOR SOLUTION ORAL
Qty: 0 | Refills: 0 | DISCHARGE
Start: 2025-05-14

## 2025-05-14 RX ORDER — SODIUM BICARBONATE 1 MEQ/ML
1 SYRINGE (ML) INTRAVENOUS
Qty: 0 | Refills: 0 | DISCHARGE
Start: 2025-05-14

## 2025-05-14 RX ORDER — MIRABEGRON 50 MG/1
1 TABLET, FILM COATED, EXTENDED RELEASE ORAL
Refills: 0 | DISCHARGE

## 2025-05-14 RX ORDER — METOPROLOL SUCCINATE 50 MG/1
5 TABLET, EXTENDED RELEASE ORAL ONCE
Refills: 0 | Status: DISCONTINUED | OUTPATIENT
Start: 2025-05-14 | End: 2025-05-14

## 2025-05-14 RX ORDER — SIMETHICONE 80 MG
1 TABLET,CHEWABLE ORAL
Qty: 0 | Refills: 0 | DISCHARGE
Start: 2025-05-14

## 2025-05-14 RX ORDER — HYDROMORPHONE/SOD CHLOR,ISO/PF 2 MG/10 ML
2 SYRINGE (ML) INJECTION ONCE
Refills: 0 | Status: DISCONTINUED | OUTPATIENT
Start: 2025-05-14 | End: 2025-05-14

## 2025-05-14 RX ORDER — GABAPENTIN 400 MG/1
100 CAPSULE ORAL DAILY
Refills: 0 | Status: DISCONTINUED | OUTPATIENT
Start: 2025-05-14 | End: 2025-05-14

## 2025-05-14 RX ORDER — SITAGLIPTIN 100 MG/1
1 TABLET, FILM COATED ORAL
Refills: 0 | DISCHARGE

## 2025-05-14 RX ORDER — SENNA 187 MG
2 TABLET ORAL
Qty: 0 | Refills: 0 | DISCHARGE
Start: 2025-05-14

## 2025-05-14 RX ORDER — METHOCARBAMOL 500 MG/1
2 TABLET, FILM COATED ORAL
Refills: 0 | DISCHARGE

## 2025-05-14 RX ORDER — ACETAMINOPHEN 500 MG/5ML
2 LIQUID (ML) ORAL
Qty: 0 | Refills: 0 | DISCHARGE
Start: 2025-05-14

## 2025-05-14 RX ORDER — B1/B2/B3/B5/B6/B12/VIT C/FOLIC 500-0.5 MG
1 TABLET ORAL
Qty: 0 | Refills: 0 | DISCHARGE
Start: 2025-05-14

## 2025-05-14 RX ORDER — TRAMADOL HYDROCHLORIDE 50 MG/1
1 TABLET, FILM COATED ORAL
Refills: 0 | DISCHARGE

## 2025-05-14 RX ORDER — ACETAMINOPHEN 500 MG/5ML
650 LIQUID (ML) ORAL EVERY 6 HOURS
Refills: 0 | Status: DISCONTINUED | OUTPATIENT
Start: 2025-05-14 | End: 2025-05-14

## 2025-05-14 RX ORDER — TAMSULOSIN HYDROCHLORIDE 0.4 MG/1
1 CAPSULE ORAL
Qty: 0 | Refills: 0 | DISCHARGE
Start: 2025-05-14

## 2025-05-14 RX ORDER — MELATONIN 5 MG
1 TABLET ORAL
Qty: 0 | Refills: 0 | DISCHARGE
Start: 2025-05-14

## 2025-05-14 RX ORDER — METHOCARBAMOL 500 MG/1
1 TABLET, FILM COATED ORAL
Qty: 0 | Refills: 0 | DISCHARGE
Start: 2025-05-14

## 2025-05-14 RX ORDER — HYDROMORPHONE/SOD CHLOR,ISO/PF 2 MG/10 ML
0.5 SYRINGE (ML) INJECTION ONCE
Refills: 0 | Status: DISCONTINUED | OUTPATIENT
Start: 2025-05-14 | End: 2025-05-14

## 2025-05-14 RX ORDER — HYDROMORPHONE/SOD CHLOR,ISO/PF 2 MG/10 ML
1 SYRINGE (ML) INJECTION
Qty: 0 | Refills: 0 | DISCHARGE
Start: 2025-05-14

## 2025-05-14 RX ADMIN — APIXABAN 5 MILLIGRAM(S): 2.5 TABLET, FILM COATED ORAL at 06:00

## 2025-05-14 RX ADMIN — Medication 650 MILLIGRAM(S): at 19:00

## 2025-05-14 RX ADMIN — Medication 0.5 MILLIGRAM(S): at 10:45

## 2025-05-14 RX ADMIN — Medication 1 TABLET(S): at 11:41

## 2025-05-14 RX ADMIN — Medication 2 MILLIGRAM(S): at 16:40

## 2025-05-14 RX ADMIN — Medication 325 MILLIGRAM(S): at 06:00

## 2025-05-14 RX ADMIN — Medication 25 MILLIGRAM(S): at 06:01

## 2025-05-14 RX ADMIN — GABAPENTIN 100 MILLIGRAM(S): 400 CAPSULE ORAL at 11:40

## 2025-05-14 RX ADMIN — Medication 325 MILLIGRAM(S): at 17:25

## 2025-05-14 RX ADMIN — Medication 325 MILLIGRAM(S): at 11:40

## 2025-05-14 RX ADMIN — Medication 2 MILLIGRAM(S): at 16:11

## 2025-05-14 RX ADMIN — APIXABAN 5 MILLIGRAM(S): 2.5 TABLET, FILM COATED ORAL at 17:25

## 2025-05-14 RX ADMIN — Medication 25 MILLIGRAM(S): at 13:16

## 2025-05-14 RX ADMIN — POLYETHYLENE GLYCOL 3350 17 GRAM(S): 17 POWDER, FOR SOLUTION ORAL at 06:00

## 2025-05-14 RX ADMIN — METOPROLOL SUCCINATE 100 MILLIGRAM(S): 50 TABLET, EXTENDED RELEASE ORAL at 06:00

## 2025-05-14 RX ADMIN — Medication 0.5 MILLIGRAM(S): at 10:20

## 2025-05-14 RX ADMIN — METHOCARBAMOL 750 MILLIGRAM(S): 500 TABLET, FILM COATED ORAL at 19:00

## 2025-05-14 NOTE — PROGRESS NOTE ADULT - SUBJECTIVE AND OBJECTIVE BOX
Patient is a 64y old  Male who presents with a chief complaint of DEEDEE on CKD, normocytic anemia (13 May 2025 10:38)      INTERVAL HX:  - TOV - 3rd scan retaining, s/p SC w approx 800cc urine out  - Indwelling maldonado reordered    Allergies:  penicillin (Unknown)  clindamycin (Rash)    Medications:  acetaminophen     Tablet .. 650 milliGRAM(s) Oral every 6 hours PRN  apixaban 5 milliGRAM(s) Oral two times a day  ferrous    sulfate 325 milliGRAM(s) Oral daily  hydrALAZINE 25 milliGRAM(s) Oral every 8 hours  HYDROmorphone   Tablet 2 milliGRAM(s) Oral every 6 hours PRN  lactulose Syrup 10 Gram(s) Oral once PRN  lidocaine   4% Patch 1 Patch Transdermal daily PRN  melatonin 3 milliGRAM(s) Oral at bedtime  methocarbamol 750 milliGRAM(s) Oral every 12 hours PRN  metoprolol succinate  milliGRAM(s) Oral daily  Nephro-shannon 1 Tablet(s) Oral daily  polyethylene glycol 3350 17 Gram(s) Oral two times a day  senna 2 Tablet(s) Oral at bedtime  simethicone 80 milliGRAM(s) Chew three times a day PRN  sodium bicarbonate 325 milliGRAM(s) Oral every 12 hours  sodium chloride 0.9%. 1000 milliLiter(s) IV Continuous <Continuous>  tamsulosin 0.4 milliGRAM(s) Oral at bedtime    Vitals:  T(C): 36.8 (05-14-25 @ 05:39), Max: 37.1 (05-13-25 @ 20:08)  HR: 100 (05-14-25 @ 05:39) (98 - 103)  BP: 145/102 (05-14-25 @ 05:39) (123/81 - 145/102)  RR: 20 (05-14-25 @ 05:39) (18 - 20)  SpO2: 94% (05-14-25 @ 05:39) (94% - 98%)  I/O's:    05-13-25 @ 07:01  -  05-14-25 @ 07:00  --------------------------------------------------------  IN: 400 mL / OUT: 2750 mL / NET: -2350 mL      Physical Exam:  GENERAL: NAD, resting in bed  HEAD: normocephalic, atraumatic  CARDIAC: regular rate and rhythm, normal S1S2  PULM: normal breath sounds, clear to ascultation bilaterally, no rales, rhonchi, wheezing  GI: Abd soft, nondistended, nontender, no rebound tenderness, no guarding, no rigidity  : +Maldonado draining yellow urine  NEURO: no focal motor or sensory deficits, AAOx3  MSK - trace edema, L knee tender to palpation, no erythema or fluctuance       Labs:                        8.2    8.88  )-----------( 186      ( 14 May 2025 07:13 )             26.8     05-14    145  |  109[H]  |  100[H]  ----------------------------<  104[H]  3.8   |  18[L]  |  4.09[H]    Ca    8.4      14 May 2025 07:12  Phos  4.7     05-14  Mg     2.0     05-14    TPro  5.5[L]  /  Alb  2.5[L]  /  TBili  0.4  /  DBili  x   /  AST  14  /  ALT  18  /  AlkPhos  102  05-14        Radiology/Procedures: Reviewed.   Patient is a 64y old  Male who presents with a chief complaint of DEEDEE on CKD, normocytic anemia (13 May 2025 10:38)      INTERVAL HX:  - TOV - 3rd scan retaining, s/p SC w approx 800cc urine out  - Indwelling maldonado reordered  - Pt seen and examined at bedside. Feels frustrated about his knee pain. Discussed with orthopedic surgery service - no role for inpatient knee replacement at this point - ortho assessed patient.     Allergies:  penicillin (Unknown)  clindamycin (Rash)    Medications:  acetaminophen     Tablet .. 650 milliGRAM(s) Oral every 6 hours PRN  apixaban 5 milliGRAM(s) Oral two times a day  ferrous    sulfate 325 milliGRAM(s) Oral daily  hydrALAZINE 25 milliGRAM(s) Oral every 8 hours  HYDROmorphone   Tablet 2 milliGRAM(s) Oral every 6 hours PRN  lactulose Syrup 10 Gram(s) Oral once PRN  lidocaine   4% Patch 1 Patch Transdermal daily PRN  melatonin 3 milliGRAM(s) Oral at bedtime  methocarbamol 750 milliGRAM(s) Oral every 12 hours PRN  metoprolol succinate  milliGRAM(s) Oral daily  Nephro-shannon 1 Tablet(s) Oral daily  polyethylene glycol 3350 17 Gram(s) Oral two times a day  senna 2 Tablet(s) Oral at bedtime  simethicone 80 milliGRAM(s) Chew three times a day PRN  sodium bicarbonate 325 milliGRAM(s) Oral every 12 hours  sodium chloride 0.9%. 1000 milliLiter(s) IV Continuous <Continuous>  tamsulosin 0.4 milliGRAM(s) Oral at bedtime    Vitals:  T(C): 36.8 (05-14-25 @ 05:39), Max: 37.1 (05-13-25 @ 20:08)  HR: 100 (05-14-25 @ 05:39) (98 - 103)  BP: 145/102 (05-14-25 @ 05:39) (123/81 - 145/102)  RR: 20 (05-14-25 @ 05:39) (18 - 20)  SpO2: 94% (05-14-25 @ 05:39) (94% - 98%)  I/O's:    05-13-25 @ 07:01  -  05-14-25 @ 07:00  --------------------------------------------------------  IN: 400 mL / OUT: 2750 mL / NET: -2350 mL      Physical Exam:  GENERAL: NAD, resting in bed  HEAD: normocephalic, atraumatic  CARDIAC: regular rate and rhythm, normal S1S2  PULM: normal breath sounds, clear to ascultation bilaterally, no rales, rhonchi, wheezing  GI: Abd soft, nondistended, nontender, no rebound tenderness, no guarding, no rigidity  : no suprapubic tenderness  NEURO: no focal motor or sensory deficits, AAOx3  MSK - trace edema, L knee tender to palpation, no erythema or fluctuance       Labs:                        8.2    8.88  )-----------( 186      ( 14 May 2025 07:13 )             26.8     05-14    145  |  109[H]  |  100[H]  ----------------------------<  104[H]  3.8   |  18[L]  |  4.09[H]    Ca    8.4      14 May 2025 07:12  Phos  4.7     05-14  Mg     2.0     05-14    TPro  5.5[L]  /  Alb  2.5[L]  /  TBili  0.4  /  DBili  x   /  AST  14  /  ALT  18  /  AlkPhos  102  05-14        Radiology/Procedures: Reviewed.

## 2025-05-14 NOTE — DISCHARGE NOTE NURSING/CASE MANAGEMENT/SOCIAL WORK - FINANCIAL ASSISTANCE
[facility] provides services at a reduced cost to those who are determined to be eligible through [facility]’s financial assistance program. Information regarding [facility]’s financial assistance program can be found by going to [email or web address] or by calling [phone].

## 2025-05-14 NOTE — PROGRESS NOTE ADULT - PROVIDER SPECIALTY LIST ADULT
Internal Medicine
Nephrology
Nephrology
Infectious Disease
Nephrology
Nephrology
Internal Medicine
Nephrology
Internal Medicine

## 2025-05-14 NOTE — PROGRESS NOTE ADULT - ATTENDING COMMENTS
Patient seen and examined with the resident and team. I agree with above  documentation unless noted below.  No events   no cough or SOB   afebrile   o/e - chest is clear   Woods with slightly dark urine                             7.0    8.60  )-----------( 187      ( 09 May 2025 06:47 )             23.7     05-09    143  |  107  |  97[H]  ----------------------------<  109[H]  4.0   |  20[L]  |  4.69[H]    Ca    8.3[L]      09 May 2025 06:47  Phos  4.3     05-09  Mg     1.9     05-09    TPro  5.7[L]  /  Alb  2.3[L]  /  TBili  0.5  /  DBili  x   /  AST  30  /  ALT  23  /  AlkPhos  148[H]  05-09               63 Y/O/M PMH T2DM, HTN, HLD, CKD, AFib (Eliquis), Prostate cancer, OA, gout, recent admit [facility] 4/2-4/5 for mechanical fall,   now with DEEDEE on CKD/ urinary retention / obstructive uropathy now with fever /pyuria/ E.Coli UTI  blood cx negative DC Aztreonam in AM  DEEDEE/ CKD - likely obstructive uropathy / urinary retention decreasing  Cr trend   Metabolic acidosis - on PO NahCO3 HCO3 20  renal note appreciated  Chronic Afib on AC  Anemia - likely chronic consider a unit   MR w/JON for Renal cyst when Cr improve  TOV after ambulation   Rest as above   Plan discussed with patient
Patient seen and examined with the resident and team. I agree with above  documentation unless noted below.  Chart reviewed/ hospital  course reviewed   Imaging reviewed   patient spike fever up to 101 F  no cough or SOB   c/o left knee Pain but that is chronic due to OA   o/e - chest is clear   LE - Foot exam - no sign of cellulitis                         7.0    10.09 )-----------( 177      ( 05 May 2025 06:43 )             22.3     05-05    140  |  107  |  103[H]  ----------------------------<  102[H]  3.9   |  17[L]  |  5.12[H]    Ca    8.4      05 May 2025 06:43  Phos  5.2     05-05  Mg     1.8     05-05    TPro  6.2  /  Alb  3.0[L]  /  TBili  0.6  /  DBili  x   /  AST  9[L]  /  ALT  9[L]  /  Alk Phos  77  05-04      63 Y/O/M PMH T2DM, HTN, HLD, CKD, AFib (Eliquis), Prostate cancer, OA, gout, recent admit [facility] 4/2-4/5 for mechanical fall, DEEDEE on CKD, discharged to [facility] today d/t lab abn (SCr 5.98, Hgb 7.1) and found to have obstructive uropathy and urinary retention on Woods   Woods draining well   now with Fever / no pulm symptoms   no abdominal pain  check UA + CXR + Blood Cx   start epimeric IV Abx   Anemia - no evidence of bleeding will transfuse PRBC and if no appropriate rise in Hb  then consider hip  Chronic L knee pain - Volteran gel topical /PT  DEEDEE on CKD (ESRD)- renal note appreciated  Chronic Afib ob AC and  Continue Toprol and hydralazine for HTN  CTAP with b/l renal lesions. MR w/JON as outpatient once creatinine is improved   Nephrology following, recs appreciated  Increase sodium bicarb for persistent metabolic acidosis  Transfuse for Hb<7. Hb on repeat CBC 7.1, thus will hold off transfusion today  Rest of plan as detailed in resident's note above.  Plan discussed with patient
64M PMH T2DM, HTN, HLD, CKD, AFib (Eliquis), prostate cancer, OA, gout, admit [facility] (under Dr. Woo service) 4/2-4/5 for mechanical fall, DEEDEE on CKD, [facility] (SCr 5.98, Hgb 7.1).    Chronic L knee pain and L hip hematoma   DEEDEE on CKD (ESRD)  Normocytic Anemia  HTN  Chronic Afib    CTAP with b/l renal lesions. MR w/JON as outpatient  Nephrology following, recs appreciated  Cr improving s/p maldonado placement  c/w 1/2 NS to match UOP and to reduce risk of hypovolemia associated with post-obstructive diuresis  Monitor L hip hematoma site. dilaudid added for better pain control  Endorsing discomfort due to constipation. dulcolax suppository x 1 ordered  Continue Toprol and hydralazine for HTN  Continue eliquis and Toprol for chronic Afib     Rest of plan as detailed in resident's note above.    Plan discussed with patient and Team 6 Resident Dr. Lee.
64M PMH T2DM, HTN, HLD, CKD, AFib (Eliquis), prostate cancer, OA, gout, admit [facility] (under Dr. Woo service) 4/2-4/5 for mechanical fall, DEEDEE on CKD, [facility] (SCr 5.98, Hgb 7.1).    Chronic L knee pain and L hip hematoma   DEEDEE on CKD (ESRD)  Normocytic Anemia  HTN  Chronic Afib    CTAP with b/l renal lesions. MR w/JON as outpatient  Nephrology following, recs appreciated  Cr improving s/p maldonado placement but still with significant UOP, net negative 4.14L in last 24 hours  increase 1/2 NS to 100cc/hr to better match UOP and to reduce risk of hypovolemia associated with post-obstructive diuresis  Monitor L hip hematoma site. dilaudid added for better pain control with re-postioning  Increase sodium bicarb for persistent metabolic acidosis  Transfuse for Hb<7. Hb on repeat CBC 7.1, thus will hold off transfusion today  Continue Toprol and hydralazine for HTN  Continue eliquis and Toprol for chronic Afib    Rest of plan as detailed in resident's note above.    Plan discussed with patient and Team 6 Resident Dr. Coronel.
Patient seen and examined   Labs and vitals are reviewed  no new complain   exam is unchanged   05-12    145  |  107  |  98[H]  ----------------------------<  102[H]  4.1   |  20[L]  |  4.29[H]    Ca    8.5      12 May 2025 06:34  Phos  4.2     05-12  Mg     2.0     05-12    TPro  5.7[L]  /  Alb  2.5[L]  /  TBili  0.5  /  DBili  x   /  AST  29  /  ALT  27  /  AlkPhos  138[H]  05-12                          8.2    9.10  )-----------( 194      ( 12 May 2025 06:34 )             27.4      63 Y/O/M PMH T2DM, HTN, HLD, CKD, AFib (Eliquis), Prostate cancer, OA, gout, recent admit [facility] 4/2-4/5 for mechanical fall,   now with DEEDEE on CKD/ urinary retention / obstructive uropathy now with fever /pyuria/ E.Coli UTI  on CTX  DEEDEE/ CKD - likely obstructive uropathy / urinary retention decreasing  Cr trend   May attempt TOV  Metabolic acidosis- stable  renal note appreciated  Chronic Afib on AC  Anemia - Hb low but stable   Rest as above   Plan discussed with patient.
Patient seen and examined. Plan as discussed w/ Dr. Stuart: appreciate ID's evaluation and recommendations to continue Abx course w/ IV CTX x7days; Cr improving, appreciate nephrology's recommendations, monitor UOP and electrolytes closely along w/ hemodynamics; consider TOV vs. dispo with Woods.
Patient seen and examined   Labs and vitals are reviewed  Woods DCed still with retention requiring straight cath   afebrile   exam is unchanged                         8.2    8.34  )-----------( 193      ( 13 May 2025 07:15 )             27.4     05-13    145  |  108  |  104[H]  ----------------------------<  103[H]  3.9   |  19[L]  |  4.05[H]    Ca    8.6      13 May 2025 07:15  Phos  4.9     05-13  Mg     2.0     05-13    TPro  5.6[L]  /  Alb  2.6[L]  /  TBili  0.4  /  DBili  x   /  AST  20  /  ALT  21  /  AlkPhos  118  05-13    63 Y/O/M PMH T2DM, HTN, HLD, CKD, AFib (Eliquis), Prostate cancer, OA, gout, recent admit [facility] 4/2-4/5 for mechanical fall,   now with DEEDEE on CKD/ urinary retention / obstructive uropathy now with fever /pyuria/ E.Coli UTI  s/p Rx  DEEDEE/ CKD - likely obstructive uropathy / urinary retention   Cr is decreasing   Woods DCed but still retaining being monitored if not resolved we need to reintroduce Woods   Metabolic acidosis- stable on NaHCO3 tab  renal note appreciated  continue AC  Anemia - stable   Rest as above   Plan discussed with patient.
Patient seen and examined with the resident and team. I agree with above  documentation unless noted below.  no  fever   left knee pain but chronic   no cough or SOB   o/e - chest is clear                         7.8    10.32 )-----------( 177      ( 07 May 2025 08:45 )             24.9     05-07    140  |  104  |  113[H]  ----------------------------<  114[H]  4.0   |  21[L]  |  5.06[H]    Ca    8.9      07 May 2025 08:45  Phos  5.6     05-07  Mg     1.9     05-07    TPro  5.9[L]  /  Alb  2.6[L]  /  TBili  0.7  /  DBili  x   /  AST  14  /  ALT  15  /  AlkPhos  98  05-07    Ucx- E.Coli                          63 Y/O/M PMH T2DM, HTN, HLD, CKD, AFib (Eliquis), Prostate cancer, OA, gout, recent admit [facility] 4/2-4/5 for mechanical fall,   now with DEEDEE on CKD/ urinary retention / obstructive uropathy now with fever /pyuria/ suspected UTI  await urine cx  blood cx  aztreonam   s/p iv vanco   Woods draining well   Anemia - s/p  PRBC appropriate rise in Hb  Chronic L knee pain - Volteran gel topical /PT  DEEDEE on CKD (ESRD)- Cr decreasing trend   renal note appreciated  Chronic Afib on AC and  Continue Toprol and hydralazine for HTN  CTAP with b/l renal lesions. MR w/JON as outpatient once creatinine is improved   NAGMA_ sodium bicarb  tab  Rest of plan as detailed in resident's note above.  Plan discussed with patient
Patient seen and examined with the resident and team. I agree with above  documentation unless noted below.  No  fever   No events   no cough or SOB   o/e - chest is clear   Woods with slightly dark urine                         7.3    9.92  )-----------( 182      ( 08 May 2025 07:10 )             23.3     05-08    143  |  107  |  94[H]  ----------------------------<  102[H]  3.8   |  18[L]  |  4.84[H]    Ca    8.7      08 May 2025 07:05  Phos  5.1     05-08  Mg     1.9     05-08    TPro  5.7[L]  /  Alb  2.4[L]  /  TBili  0.6  /  DBili  x   /  AST  29  /  ALT  22  /  AlkPhos  125[H]  05-08                 65 Y/O/M PMH T2DM, HTN, HLD, CKD, AFib (Eliquis), Prostate cancer, OA, gout, recent admit [facility] 4/2-4/5 for mechanical fall,   now with DEEDEE on CKD/ urinary retention / obstructive uropathy now with fever /pyuria/ E.Coli UTI  blood cx negative   On aztreonam   DEEDEE/ CKD - slight decrease in Cr   Metabolic acidosis - on PO NahCO3  renal note appreciated  Woods draining   Chronic Afib on AC and  Continue Toprol and hydralazine for HTN  MR w/JON for Renal cyst when   Rest as above   Plan discussed with patient
Patient seen and examined with the resident and team. I agree with above  documentation unless noted below.  continue to have fever   left knee pain but chronic   no cough or SOB   o/e - chest is clear   LE - Foot exam - no sign of cellulitis           Left knee - no erythema                            7.9    11.92 )-----------( 171      ( 06 May 2025 06:58 )             25.1     05-06    142  |  106  |  111[H]  ----------------------------<  102[H]  3.9   |  18[L]  |  4.91[H]    Ca    8.6      06 May 2025 06:58  Phos  5.4     05-06  Mg     1.9     05-06    TPro  5.9[L]  /  Alb  2.5[L]  /  TBili  0.8  /  DBili  x   /  AST  20  /  ALT  15  /  AlkPhos  87  05-06    63 Y/O/M PMH T2DM, HTN, HLD, CKD, AFib (Eliquis), Prostate cancer, OA, gout, recent admit [facility] 4/2-4/5 for mechanical fall,   now with DEEDEE on CKD/ urinary retention / obstructive uropathy now with fever /pyuria/ suspected UTI  await urine cx  blood cx  aztreonam   s/p iv vanco   Woods draining well   Anemia - s/p  PRBC appropriate rise in Hb  Chronic L knee pain - Volteran gel topical /PT  DEEDEE on CKD (ESRD)- Cr decreasing trend   renal note appreciated  Chronic Afib on AC and  Continue Toprol and hydralazine for HTN  CTAP with b/l renal lesions. MR w/JON as outpatient once creatinine is improved   NAGMA_ sodium bicarb  tab  Rest of plan as detailed in resident's note above.  Plan discussed with patient
Patient seen and examined   Labs and vitals are reviewed  afebrile   c/o knee pain which is on and off increased chronic   continue to have urinary retention required reinsertion of Woods   exam is unchanged   Left knee - no warmth no effusion                         8.2    8.88  )-----------( 186      ( 14 May 2025 07:13 )             26.8     05-14    145  |  109[H]  |  100[H]  ----------------------------<  104[H]  3.8   |  18[L]  |  4.09[H]    Ca    8.4      14 May 2025 07:12  Phos  4.7     05-14  Mg     2.0     05-14    TPro  5.5[L]  /  Alb  2.5[L]  /  TBili  0.4  /  DBili  x   /  AST  14  /  ALT  18  /  AlkPhos  102  05-14             65 Y/O/M PMH T2DM, HTN, HLD, CKD, AFib (Eliquis), Prostate cancer, OA, gout, recent admit [facility] 4/2-4/5 for mechanical fall,   now with DEEDEE on CKD/ urinary retention / obstructive uropathy now with fever /pyuria/ E.Coli UTI  s/p Rx now afebrile   clinically stable   DEEDEE/ CKD - likely obstructive uropathy / urinary retention on Woods   Cr is slowly decreasing   Metabolic Acidosis- stable on NaHCO3 tab- renal note appreciated okay for DC and he would follow the patient at the rehab   continue AC  Anemia - stable   Severe arthritis - of  right knee no sign of septic arthritis patient is ware stated he has been told about knee replacement - we called ortho and I d/w ortho - who recommended out patient follow up with surgeon. pain control / Lidoderm patch/ low dose of Neurontin and PT if renal functions improve can use diclofenac gel topical   Rest as above   Plan d/w with patient.  PT- [facility] DC planning  d/w ortho house staff
64M PMH T2DM, HTN, HLD, CKD, AFib (Eliquis), prostate cancer, OA, gout, admit [facility] (under Dr. Woo service) 4/2-4/5 for mechanical fall, DEEDEE on CKD, discharged to [facility] today d/t lab abn (SCr 5.98, Hgb 7.1).    Chronic L knee pain and L hip hematoma   DEEDEE on CKD (ESRD)  Normocytic Anemia  HTN  Chronic Afib    CTAP with b/l renal lesions. MR w/JON recs however with renal failure will wait  f/u nephro recs  2.7L voided with maldonado. Denies retention symptoms  Monitor L hip hematoma site  c/w hydralazine, toprol, monitor BP  c/w BB, eliquis    Further plan per resident note above  Plan discussed with the patient at bedside. All questions answered. Patient to update family
Patient seen and examined. Plan as discussed w/ Dr. Costello: patient febrile overnight while remaining on Abx course for UTI w/ Aztreonam -- will appreciate ID's evaluation and recommendations while f/u infectious w/u sent; Cr improving, appreciate nephrology's recommendations, monitor UOP and electrolytes closely along w/ hemodynamics.

## 2025-05-14 NOTE — PROGRESS NOTE ADULT - SUBJECTIVE AND OBJECTIVE BOX
Overnight events noted      VITAL:  T(C): , Max: 37.1 (05-13-25 @ 20:08)  T(F): , Max: 98.7 (05-13-25 @ 20:08)  HR: 100 (05-14-25 @ 05:39)  BP: 145/102 (05-14-25 @ 05:39)  RR: 20 (05-14-25 @ 05:39)  SpO2: 94% (05-14-25 @ 05:39)  Urine output 2750cc/24h      PHYSICAL EXAM:  Constitutional: NAD, Alert  HEENT: NCAT, MMM  Neck: Supple, No JVD  Respiratory: CTA-b/l  Cardiovascular: reg s1s2  Gastrointestinal: BS+, soft, NT/ND  : no maldonado  Extremities: No peripheral edema b/l  Neurological: reduced generalized strength  Back: no CVAT b/l  Skin: No rashes, no nevi      LABS:                        8.2    8.88  )-----------( 186      ( 14 May 2025 07:13 )             26.8     Na(145)/K(3.8)/Cl(109)/HCO3(18)/BUN(100)/Cr(4.09)Glu(104)/Ca(8.4)/Mg(2.0)/PO4(4.7)    05-14 @ 07:12  Na(145)/K(3.9)/Cl(108)/HCO3(19)/BUN(104)/Cr(4.05)Glu(103)/Ca(8.6)/Mg(2.0)/PO4(4.9)    05-13 @ 07:15  Na(145)/K(4.1)/Cl(107)/HCO3(20)/BUN(98)/Cr(4.29)Glu(102)/Ca(8.5)/Mg(2.0)/PO4(4.2)    05-12 @ 06:34      IMPRESSION: 64M w/ HTN, gout, AFib, and CKD4-5, 5/1/25 p/w DEEDEE on CKD  (1)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  (2)DEEDEE - obstructive - improving s/p maldonado placement  (3)Metabolic acidosis - renally mediated -acceptable, on standing PO NaHCO3  (4)Hypernatremia - borderline high but acceptable/improved from days ago, s/p reduction in PO NaHCO3  (5)CV - acceptable BP/volume  (6)Anemia - s/p PRBCs/Retacrit; on standing PO FeSO4; improved relative to admission  (7)ID - UTI - s/p IV Azactam  (8) - maldonado removed/reliant on intermittent catheterization      RECOMMEND:  (1)Meds as ordered/dose new meds for GFR 10-15ml/min  (2)No objection to discharge; I will look to follow him at [facility]        Hilton May MD  [facility]  Office/on call physician: [phone]  Cell (7a-7p): [phone]       (+)L knee pain  no sob      VITAL:  T(C): , Max: 37.1 (05-13-25 @ 20:08)  T(F): , Max: 98.7 (05-13-25 @ 20:08)  HR: 100 (05-14-25 @ 05:39)  BP: 145/102 (05-14-25 @ 05:39)  RR: 20 (05-14-25 @ 05:39)  SpO2: 94% (05-14-25 @ 05:39)  Urine output 2750cc/24h      PHYSICAL EXAM:  Constitutional: NAD, Alert  HEENT: NCAT, MMM  Neck: Supple, No JVD  Respiratory: CTA-b/l  Cardiovascular: reg s1s2  Gastrointestinal: BS+, soft, NT/ND  : no maldonado  Extremities: No peripheral edema b/l  Neurological: reduced generalized strength  Back: no CVAT b/l  Skin: No rashes, no nevi      LABS:                        8.2    8.88  )-----------( 186      ( 14 May 2025 07:13 )             26.8     Na(145)/K(3.8)/Cl(109)/HCO3(18)/BUN(100)/Cr(4.09)Glu(104)/Ca(8.4)/Mg(2.0)/PO4(4.7)    05-14 @ 07:12  Na(145)/K(3.9)/Cl(108)/HCO3(19)/BUN(104)/Cr(4.05)Glu(103)/Ca(8.6)/Mg(2.0)/PO4(4.9)    05-13 @ 07:15  Na(145)/K(4.1)/Cl(107)/HCO3(20)/BUN(98)/Cr(4.29)Glu(102)/Ca(8.5)/Mg(2.0)/PO4(4.2)    05-12 @ 06:34      IMPRESSION: 64M w/ HTN, gout, AFib, and CKD4-5, 5/1/25 p/w DEEDEE on CKD  (1)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  (2)DEEDEE - obstructive - improving s/p maldonado placement  (3)Metabolic acidosis - renally mediated -acceptable, on standing PO NaHCO3  (4)Hypernatremia - borderline high but acceptable/improved from days ago, s/p reduction in PO NaHCO3  (5)CV - acceptable BP/volume  (6)Anemia - s/p PRBCs/Retacrit; on standing PO FeSO4; improved relative to admission  (7)ID - UTI - s/p IV Azactam  (8) - maldonado removed/reliant on intermittent catheterization      RECOMMEND:  (1)Meds as ordered/dose new meds for GFR 10-15ml/min  (2)No objection to discharge; I will look to follow him at [facility]        Hilton May MD  [facility]  Office/on call physician: [phone]  Cell (7a-7p): [phone]

## 2025-05-14 NOTE — PROGRESS NOTE ADULT - ASSESSMENT
65 YO M w/ PMH of DM2 (on Januvia), HTN, HLD, gout, A-fib (on Eliquis), CKD not on dialysis (last documented Cr 2.82 on 4/5/25), severe OA, prostate cancer (s/p radiation), recent admission for fall discharged to [facility], admitted for elevated sCr s/p 2.7L removal and Woods placement - suspected post-renal DEEDEE, course c/b UTI s/p 7d antibiotic course (Aztreonam, CTX) 65 YO M w/ PMH of DM2 (on Januvia), HTN, HLD, gout, A-fib (on Eliquis), CKD not on dialysis (last documented Cr 2.82 on 4/5/25), severe OA, prostate cancer (s/p radiation), recent admission for fall discharged to [facility], admitted for elevated sCr s/p 2.7L removal and Woods placement - suspected post-renal DEEDEE, course c/b UTI s/p 7d antibiotic course (Aztreonam, CTX), ongoing dc planning for [facility]

## 2025-05-14 NOTE — PROGRESS NOTE ADULT - PROBLEM SELECTOR PROBLEM 1
Abnormal renal function

## 2025-05-14 NOTE — PROGRESS NOTE ADULT - PROBLEM SELECTOR PLAN 10
- Prostate CA s/p radiation; unclear if this is contributing to urinary obstruction and retention   - Home Myrbetriq 50mg daily    > f/u Outpatient  - Holding Myrebetiq for now - will dc upon discharge as likely contributing to significant retention on admission  - Also w/ reported history BPH, started Flomax 0.4mg qhs - Prostate CA s/p radiation; unclear if this is contributing to urinary obstruction and retention   - Home Myrbetriq 50mg daily    > f/u Outpatient urology  - Holding Myrebetiq  - will dc upon discharge as likely contributing to significant retention on admission  - Also w/ reported history BPH, started Flomax 0.4mg qhs

## 2025-05-14 NOTE — PROGRESS NOTE ADULT - PROBLEM SELECTOR PLAN 6
- Recent hospitalization after mechanical fall.  - Chronic L knee pain due to severe OA  - Home pain as-needed pain meds include: Acetaminophen; Tramadol 50mg Q8H for pain (4-6); methocarbamol 750mg BID; lidocaine patch.    > c/w PRN Lidocaine patch  > PO pain regimen as needed: Tylenol 650mg Q6H for mild pain PRN, Tramadol 50mg BID for moderate pain PRN (renally-adjusted); methocarbamol 750mg BID for muscle spasm PRN. Dilaudid 2mg q6 PRN for severe pain and dressing changes (started 05/03)  > c/w Fall precaution  > c/w Wound care recs  > f/u PT eval --> likely back to [facility]   > f/u Nutrition consult - Recent hospitalization after mechanical fall.  - Chronic L knee pain due to severe OA  - Home pain as-needed pain meds include: Acetaminophen; Tramadol 50mg Q8H for pain (4-6); methocarbamol 750mg BID; lidocaine patch.    > c/w PRN Lidocaine patch  - Will add gabapentin 100mg qd today  - Ortho eval appreciated, no role for inpatient surgical intervention - outpatient followup  > PO pain regimen as needed: Tylenol 650mg Q6H for mild pain PRN, Tramadol 50mg BID for moderate pain PRN (renally-adjusted); methocarbamol 750mg BID for muscle spasm PRN. Dilaudid 2mg q6 PRN for severe pain and dressing changes (started 05/03)  > c/w Fall precaution  > c/w Wound care recs  > f/u PT eval --> [facility]  > f/u Nutrition consult

## 2025-05-14 NOTE — PROGRESS NOTE ADULT - PROBLEM SELECTOR PLAN 1
- Per nephro note in prior hospitalization: unclear if DEEDEE on CKD vs slowly progressive CKD.   - 5/1/25 Cr 5.85 at admission. (In prior hospitalization, Cr 3.9 on admission. Per Dr. Woo, baseline Cr is 2.5)  - Nephrology consulted - Dr. Hilton May (Nephrologist at [facility])  - 4/25/25 Renal US: multiple bilateral renal cysts, which are increased in size and number compared to the prior CT scan - c/f neoplasm  - Urine Lytes (05/02): FeNa 4.2% suggestive of intrinsic disease  - s/p maldonado catheter placement in ER with 2.7L fluid removed  - Ddx: Likely DEEDEE on CKD 2/2 post-renal DEEDEE s/p 2.7L fluid removed on maldonado catheter; now possibly also with component of pre-renal DEEDEE due to large volume UOP after maldonado placement --> overall DEEDEE improving    Plan:  > ongoing TOV - maldonado removed 5/12  > additional IVF ordered today (50cc/hr NS x 10h)  > daily BMP/Mg/Phos  > cw bicarb 325mg BID per nephro  > s/p Sevelamer, discontinued 5/9  > c/w strict intake/output Q6H  > appreciate Nephrology recommendations - Per nephro note in prior hospitalization: unclear if DEEDEE on CKD vs slowly progressive CKD.   - 5/1/25 Cr 5.85 at admission. (In prior hospitalization, Cr 3.9 on admission. Per Dr. Woo, baseline Cr is 2.5)  - Nephrology consulted - Dr. Hilton May (Nephrologist at [facility])  - 4/25/25 Renal US: multiple bilateral renal cysts, which are increased in size and number compared to the prior CT scan - c/f neoplasm  - Urine Lytes (05/02): FeNa 4.2% suggestive of intrinsic disease  - s/p maldonado catheter placement in ER with 2.7L fluid removed  - Ddx: Likely DEEDEE on CKD 2/2 post-renal DEEDEE s/p 2.7L fluid removed on maldonado catheter; now possibly also with component of pre-renal DEEDEE due to large volume UOP after maldonado placement --> overall DEEDEE improving    Plan:  > failed TOV ON 5/13 - indwelling maldonado reordered, will need urology followup  > daily BMP/Mg/Phos  > cw bicarb 325mg BID per nephro  > s/p Sevelamer, discontinued 5/9  > c/w strict intake/output Q6H  > appreciate Nephrology recommendations, Dr. May to follow at [facility]

## 2025-05-14 NOTE — CHART NOTE - NSCHARTNOTEFT_GEN_A_CORE
Ortho called re: L knee pain in setting of known L knee osteoarthritis. Per primary team pt and family concerned re: pain and were inquiring re: inpatient surgical intervention such as TKA or arthroscopy. Pt is rather concerned because he cannot ambulate and feels like he "cannot function" w/ the current pain. Per primary no concern for septic arthritis or new fx. Pt is admitted for DEEDEE on CKD as well as UTI s/p IVABX. Exam: limited knee ROM 2/2 pain, no pain w/ micromotion, no obvious effusion, NVI. Discussed w/ pt that arthroscopy would be of little benefit considering his current pathology (i.e. osteoarthritis). Additionally discussed that while TKA may be the proper surgical intervention that it would need to be done on an outpatient bases to ensure optimization preoperatively given the higher risk of complications postop if done in the same hospital admission. Discussed w/ pt that currently the main problem is pain and that proper pain control would be the best way to ensure he gets some functioning back. Discussed multimodal analgesia as well as possible pain consult. Emphasized the importance of PT. Recommended to primary team to optimize pain control regimen and can consider pain consult if problematic. Additionally discussed to reconsult if any concern for septic arthritis or new fracture. Will provide fast track to outpatient appointment w/ arthroplasty surgeon. Ortho available for questions.

## 2025-05-14 NOTE — DISCHARGE NOTE NURSING/CASE MANAGEMENT/SOCIAL WORK - NSDCPEFALRISK_GEN_ALL_CORE
For information on Fall & Injury Prevention, visit: [email or web address] OR  [email or web address] OR  https://www.cdc.gov/steadi/patient.html

## 2025-05-14 NOTE — PROGRESS NOTE ADULT - PROBLEM SELECTOR PLAN 11
patient here for device check requesting medication refill  -DVT Ppx: c/w Eliquis.  -Diet: CC/nephro diet  -Fall Precautions  -Dispo: eventual [facility]  -Wound Care d/c recs: "Pt will need Group 2 mattress on hospital bed and ROHO cushion for wheel chair upon discharge home"  -PT eval: likely back to [facility] -DVT Ppx: c/w Eliquis  -Diet: CC/nephro diet  -Fall Precautions  -Dispo: [facility]  -Wound Care d/c recs: "Pt will need Group 2 mattress on hospital bed and ROHO cushion for wheel chair upon discharge home"  -PT eval: [facility]

## 2025-05-14 NOTE — DISCHARGE NOTE NURSING/CASE MANAGEMENT/SOCIAL WORK - NSDCFUADDAPPT_GEN_ALL_CORE_FT
APPTS ARE READY TO BE MADE: [ ] YES    Best Family or Patient Contact (if needed):    Additional Information about above appointments (if needed):    1: Primary Care  2: Nephrology   3: Urology    Other comments or requests:

## 2025-05-14 NOTE — DISCHARGE NOTE NURSING/CASE MANAGEMENT/SOCIAL WORK - PATIENT PORTAL LINK FT
You can access the FollowMyHealth Patient Portal offered by [facility] by registering at the following website: [email or web address]. By joining [employer]’s FollowMyHealth portal, you will also be able to view your health information using other applications (apps) compatible with our system.

## 2025-05-14 NOTE — PROGRESS NOTE ADULT - TIME BILLING
- Independently obtaining a review of systems and performing a physical exam  - Reviewing consultant documentation/recommendations.  - Counselling and educating patient   - Documentation   - Time spent excludes time spent teaching.
- reviewing chart, and interpreting labs.   - Independently obtaining a review of systems and performing a physical exam  - Reviewing consultant documentation/recommendations.  - Counselling and educating patient   - Time spent excludes time spent teaching.
- Independently obtaining a review of systems and performing a physical exam  - Reviewing consultant documentation/recommendations.  - Counselling and educating patient   - Documentation   - Time spent excludes time spent teaching.
- Ordering, reviewing, and interpreting labs, testing, and imaging.  - Independently obtaining a review of systems and performing a physical exam  - Reviewing consultant documentation/recommendations.  - Counselling and educating patient regarding interpretation of aforementioned items and plan of care.  - Time spent excludes time spent teaching.
-  reviewing and interpreting labs  - Independently obtaining a review of systems and performing a physical exam  - Documentation   - Time spent excludes time spent teaching.
- Ordering, reviewing, and interpreting labs, testing, and imaging.  - Independently obtaining a review of systems and performing a physical exam  - Reviewing consultant documentation/recommendations.  - Counselling and educating patient regarding interpretation of aforementioned items and plan of care.  - Time spent excludes time spent teaching.
-  reviewing and interpreting labs  - Independently obtaining a review of systems and performing a physical exam  - Documentation   - Counselling patient   - Time spent excludes time spent teaching.
- Independently obtaining a review of systems and performing a physical exam  - Reviewing consultant documentation/recommendations.  - Counselling and educating patient   - Documentation   - Time spent excludes time spent teaching.
- Independently obtaining a review of systems and performing a physical exam  - Reviewing consultant documentation/recommendations.  - Counselling and educating patient   - Documentation   - Time spent excludes time spent teaching.
-  reviewing and interpreting labs  - Independently obtaining a review of systems and performing a physical exam  - Documentation   - DC planning   - Time spent excludes time spent teaching.
- Ordering, reviewing, and/or interpreting labs, testing, and/or imaging  - Independently obtaining a review of systems and performing a physical exam  - Reviewing prior records and where necessary, outpatient records and/or consultant documentation  - Counselling and educating patient and/or family regarding interpretation of aforementioned items and plan of care

## 2025-05-14 NOTE — PROGRESS NOTE ADULT - REASON FOR ADMISSION
DEEDEE on CKD, normocytic anemia

## 2025-05-15 LAB
CULTURE RESULTS: SIGNIFICANT CHANGE UP
SPECIMEN SOURCE: SIGNIFICANT CHANGE UP

## 2025-05-16 ENCOUNTER — APPOINTMENT (OUTPATIENT)
Dept: MRI IMAGING | Facility: IMAGING CENTER | Age: 65
End: 2025-05-16

## 2025-05-16 LAB
CULTURE RESULTS: SIGNIFICANT CHANGE UP
SPECIMEN SOURCE: SIGNIFICANT CHANGE UP

## 2025-05-20 ENCOUNTER — APPOINTMENT (OUTPATIENT)
Dept: ORTHOPEDIC SURGERY | Facility: CLINIC | Age: 65
End: 2025-05-20

## 2025-05-20 VITALS — HEIGHT: 74 IN | BODY MASS INDEX: 31.57 KG/M2 | WEIGHT: 246 LBS

## 2025-05-20 PROCEDURE — 20610 DRAIN/INJ JOINT/BURSA W/O US: CPT | Mod: LT

## 2025-05-20 PROCEDURE — 99214 OFFICE O/P EST MOD 30 MIN: CPT | Mod: 25

## 2025-06-02 PROCEDURE — 82746 ASSAY OF FOLIC ACID SERUM: CPT

## 2025-06-02 PROCEDURE — 84540 ASSAY OF URINE/UREA-N: CPT

## 2025-06-02 PROCEDURE — 84100 ASSAY OF PHOSPHORUS: CPT

## 2025-06-02 PROCEDURE — 86901 BLOOD TYPING SEROLOGIC RH(D): CPT

## 2025-06-02 PROCEDURE — 80053 COMPREHEN METABOLIC PANEL: CPT

## 2025-06-02 PROCEDURE — 83540 ASSAY OF IRON: CPT

## 2025-06-02 PROCEDURE — 93005 ELECTROCARDIOGRAM TRACING: CPT

## 2025-06-02 PROCEDURE — 83550 IRON BINDING TEST: CPT

## 2025-06-02 PROCEDURE — 84132 ASSAY OF SERUM POTASSIUM: CPT

## 2025-06-02 PROCEDURE — 85025 COMPLETE CBC W/AUTO DIFF WBC: CPT

## 2025-06-02 PROCEDURE — 74176 CT ABD & PELVIS W/O CONTRAST: CPT | Mod: MC

## 2025-06-02 PROCEDURE — 36415 COLL VENOUS BLD VENIPUNCTURE: CPT

## 2025-06-02 PROCEDURE — 82947 ASSAY GLUCOSE BLOOD QUANT: CPT

## 2025-06-02 PROCEDURE — 36430 TRANSFUSION BLD/BLD COMPNT: CPT

## 2025-06-02 PROCEDURE — 85018 HEMOGLOBIN: CPT

## 2025-06-02 PROCEDURE — 87040 BLOOD CULTURE FOR BACTERIA: CPT

## 2025-06-02 PROCEDURE — 86850 RBC ANTIBODY SCREEN: CPT

## 2025-06-02 PROCEDURE — 84156 ASSAY OF PROTEIN URINE: CPT

## 2025-06-02 PROCEDURE — 87637 SARSCOV2&INF A&B&RSV AMP PRB: CPT

## 2025-06-02 PROCEDURE — 82435 ASSAY OF BLOOD CHLORIDE: CPT

## 2025-06-02 PROCEDURE — 85045 AUTOMATED RETICULOCYTE COUNT: CPT

## 2025-06-02 PROCEDURE — 97530 THERAPEUTIC ACTIVITIES: CPT

## 2025-06-02 PROCEDURE — 87186 SC STD MICRODIL/AGAR DIL: CPT

## 2025-06-02 PROCEDURE — 86923 COMPATIBILITY TEST ELECTRIC: CPT

## 2025-06-02 PROCEDURE — 86900 BLOOD TYPING SEROLOGIC ABO: CPT

## 2025-06-02 PROCEDURE — 83605 ASSAY OF LACTIC ACID: CPT

## 2025-06-02 PROCEDURE — 81001 URINALYSIS AUTO W/SCOPE: CPT

## 2025-06-02 PROCEDURE — 82330 ASSAY OF CALCIUM: CPT

## 2025-06-02 PROCEDURE — 84300 ASSAY OF URINE SODIUM: CPT

## 2025-06-02 PROCEDURE — 85014 HEMATOCRIT: CPT

## 2025-06-02 PROCEDURE — 83735 ASSAY OF MAGNESIUM: CPT

## 2025-06-02 PROCEDURE — 99285 EMERGENCY DEPT VISIT HI MDM: CPT | Mod: 25

## 2025-06-02 PROCEDURE — 87086 URINE CULTURE/COLONY COUNT: CPT

## 2025-06-02 PROCEDURE — 84295 ASSAY OF SERUM SODIUM: CPT

## 2025-06-02 PROCEDURE — 82803 BLOOD GASES ANY COMBINATION: CPT

## 2025-06-02 PROCEDURE — 85027 COMPLETE CBC AUTOMATED: CPT

## 2025-06-02 PROCEDURE — 71045 X-RAY EXAM CHEST 1 VIEW: CPT

## 2025-06-02 PROCEDURE — P9011: CPT

## 2025-06-02 PROCEDURE — 80048 BASIC METABOLIC PNL TOTAL CA: CPT

## 2025-06-02 PROCEDURE — 86985 SPLIT BLOOD OR PRODUCTS: CPT

## 2025-06-02 PROCEDURE — 82962 GLUCOSE BLOOD TEST: CPT

## 2025-06-02 PROCEDURE — 82570 ASSAY OF URINE CREATININE: CPT

## 2025-06-02 PROCEDURE — 97110 THERAPEUTIC EXERCISES: CPT

## 2025-06-02 PROCEDURE — P9040: CPT

## 2025-06-02 PROCEDURE — 82607 VITAMIN B-12: CPT

## 2025-06-02 PROCEDURE — 82728 ASSAY OF FERRITIN: CPT

## 2025-06-02 PROCEDURE — 97162 PT EVAL MOD COMPLEX 30 MIN: CPT

## 2025-06-10 ENCOUNTER — APPOINTMENT (OUTPATIENT)
Dept: UROLOGY | Facility: CLINIC | Age: 65
End: 2025-06-10
Payer: MEDICARE

## 2025-06-10 ENCOUNTER — OUTPATIENT (OUTPATIENT)
Dept: OUTPATIENT SERVICES | Facility: HOSPITAL | Age: 65
LOS: 1 days | End: 2025-06-10
Payer: MEDICARE

## 2025-06-10 ENCOUNTER — NON-APPOINTMENT (OUTPATIENT)
Age: 65
End: 2025-06-10

## 2025-06-10 VITALS
DIASTOLIC BLOOD PRESSURE: 63 MMHG | OXYGEN SATURATION: 97 % | WEIGHT: 240 LBS | RESPIRATION RATE: 16 BRPM | HEIGHT: 74 IN | HEART RATE: 68 BPM | BODY MASS INDEX: 30.8 KG/M2 | SYSTOLIC BLOOD PRESSURE: 106 MMHG | TEMPERATURE: 97.2 F

## 2025-06-10 PROBLEM — R33.9 URINARY RETENTION: Status: ACTIVE | Noted: 2025-06-10

## 2025-06-10 PROCEDURE — 51702 INSERT TEMP BLADDER CATH: CPT

## 2025-06-10 PROCEDURE — 99203 OFFICE O/P NEW LOW 30 MIN: CPT | Mod: 25

## 2025-06-12 DIAGNOSIS — R33.9 RETENTION OF URINE, UNSPECIFIED: ICD-10-CM

## 2025-07-08 ENCOUNTER — NON-APPOINTMENT (OUTPATIENT)
Age: 65
End: 2025-07-08

## 2025-07-08 ENCOUNTER — APPOINTMENT (OUTPATIENT)
Dept: ORTHOPEDIC SURGERY | Facility: CLINIC | Age: 65
End: 2025-07-08

## 2025-07-08 VITALS — BODY MASS INDEX: 33.37 KG/M2 | HEIGHT: 74 IN | WEIGHT: 260 LBS

## 2025-07-08 PROBLEM — M17.12 PRIMARY OSTEOARTHRITIS OF LEFT KNEE: Status: ACTIVE | Noted: 2025-07-01

## 2025-07-08 PROCEDURE — 99213 OFFICE O/P EST LOW 20 MIN: CPT | Mod: 25

## 2025-07-08 PROCEDURE — 20610 DRAIN/INJ JOINT/BURSA W/O US: CPT | Mod: LT

## 2025-07-08 PROCEDURE — 99203 OFFICE O/P NEW LOW 30 MIN: CPT | Mod: 25

## 2025-07-29 ENCOUNTER — APPOINTMENT (OUTPATIENT)
Dept: UROLOGY | Facility: CLINIC | Age: 65
End: 2025-07-29

## 2025-08-26 ENCOUNTER — APPOINTMENT (OUTPATIENT)
Dept: ORTHOPEDIC SURGERY | Facility: CLINIC | Age: 65
End: 2025-08-26